# Patient Record
Sex: FEMALE | Race: WHITE | Employment: OTHER | URBAN - NONMETROPOLITAN AREA
[De-identification: names, ages, dates, MRNs, and addresses within clinical notes are randomized per-mention and may not be internally consistent; named-entity substitution may affect disease eponyms.]

---

## 2017-01-13 ENCOUNTER — OFFICE VISIT (OUTPATIENT)
Dept: FAMILY MEDICINE CLINIC | Age: 62
End: 2017-01-13

## 2017-01-13 VITALS
SYSTOLIC BLOOD PRESSURE: 122 MMHG | HEART RATE: 90 BPM | OXYGEN SATURATION: 96 % | BODY MASS INDEX: 28 KG/M2 | DIASTOLIC BLOOD PRESSURE: 60 MMHG | WEIGHT: 164 LBS | HEIGHT: 64 IN

## 2017-01-13 DIAGNOSIS — R73.02 IMPAIRED GLUCOSE TOLERANCE: ICD-10-CM

## 2017-01-13 DIAGNOSIS — E55.9 VITAMIN D DEFICIENCY: ICD-10-CM

## 2017-01-13 DIAGNOSIS — Z00.00 ROUTINE GENERAL MEDICAL EXAMINATION AT A HEALTH CARE FACILITY: Primary | ICD-10-CM

## 2017-01-13 DIAGNOSIS — E78.00 HYPERCHOLESTEROLEMIA: ICD-10-CM

## 2017-01-13 PROCEDURE — 99396 PREV VISIT EST AGE 40-64: CPT | Performed by: NURSE PRACTITIONER

## 2017-01-13 RX ORDER — PITAVASTATIN CALCIUM 4.18 MG/1
4 TABLET, FILM COATED ORAL NIGHTLY
Qty: 90 TABLET | Refills: 3 | Status: SHIPPED | OUTPATIENT
Start: 2017-01-13 | End: 2018-01-12 | Stop reason: SDUPTHER

## 2017-01-13 ASSESSMENT — ENCOUNTER SYMPTOMS
NAUSEA: 0
SORE THROAT: 0
ABDOMINAL PAIN: 0
COUGH: 0
EYE DISCHARGE: 0
BLOOD IN STOOL: 0
EYE PAIN: 0
SHORTNESS OF BREATH: 0
WHEEZING: 0
CONSTIPATION: 0
BACK PAIN: 0
VOMITING: 0
DIARRHEA: 0

## 2017-01-31 ENCOUNTER — TELEPHONE (OUTPATIENT)
Dept: FAMILY MEDICINE CLINIC | Age: 62
End: 2017-01-31

## 2017-07-07 DIAGNOSIS — R79.89 ELEVATED TSH: Primary | ICD-10-CM

## 2017-07-14 ENCOUNTER — TELEPHONE (OUTPATIENT)
Dept: FAMILY MEDICINE CLINIC | Age: 62
End: 2017-07-14

## 2017-07-17 ENCOUNTER — TELEPHONE (OUTPATIENT)
Dept: FAMILY MEDICINE CLINIC | Age: 62
End: 2017-07-17

## 2017-07-17 DIAGNOSIS — R79.89 ELEVATED TSH: ICD-10-CM

## 2017-07-17 DIAGNOSIS — E78.00 HYPERCHOLESTEROLEMIA: Primary | ICD-10-CM

## 2017-07-17 DIAGNOSIS — E55.9 VITAMIN D DEFICIENCY: ICD-10-CM

## 2017-07-17 DIAGNOSIS — R73.02 IMPAIRED GLUCOSE TOLERANCE: ICD-10-CM

## 2018-01-05 ENCOUNTER — HOSPITAL ENCOUNTER (OUTPATIENT)
Dept: LAB | Age: 63
Setting detail: SPECIMEN
Discharge: HOME | End: 2018-01-05
Payer: MEDICARE

## 2018-01-05 DIAGNOSIS — R79.89 ELEVATED TSH: ICD-10-CM

## 2018-01-05 DIAGNOSIS — R73.02 IMPAIRED GLUCOSE TOLERANCE: ICD-10-CM

## 2018-01-05 DIAGNOSIS — E55.9 VITAMIN D DEFICIENCY: ICD-10-CM

## 2018-01-05 DIAGNOSIS — E78.00 HYPERCHOLESTEROLEMIA: ICD-10-CM

## 2018-01-05 LAB
ALBUMIN SERPL-MCNC: 4 G/DL (ref 3.5–5.2)
ALBUMIN/GLOBULIN RATIO: 1.5 (ref 1–2.5)
ALP BLD-CCNC: 74 U/L (ref 35–104)
ALT SERPL-CCNC: 27 U/L (ref 5–33)
ANION GAP SERPL CALCULATED.3IONS-SCNC: 12 MMOL/L (ref 9–17)
AST SERPL-CCNC: 24 U/L
BILIRUB SERPL-MCNC: 0.64 MG/DL (ref 0.3–1.2)
BUN BLDV-MCNC: 22 MG/DL (ref 8–23)
BUN/CREAT BLD: 35 (ref 9–20)
CALCIUM SERPL-MCNC: 9.7 MG/DL (ref 8.6–10.4)
CHLORIDE BLD-SCNC: 105 MMOL/L (ref 98–107)
CHOLESTEROL/HDL RATIO: 2.5
CHOLESTEROL: 199 MG/DL
CO2: 27 MMOL/L (ref 20–31)
CREAT SERPL-MCNC: 0.62 MG/DL (ref 0.5–0.9)
ESTIMATED AVERAGE GLUCOSE: 111 MG/DL
GFR AFRICAN AMERICAN: >60 ML/MIN
GFR NON-AFRICAN AMERICAN: >60 ML/MIN
GFR SERPL CREATININE-BSD FRML MDRD: ABNORMAL ML/MIN/{1.73_M2}
GFR SERPL CREATININE-BSD FRML MDRD: ABNORMAL ML/MIN/{1.73_M2}
GLUCOSE BLD-MCNC: 106 MG/DL (ref 70–99)
HBA1C MFR BLD: 5.5 % (ref 4.8–5.9)
HDLC SERPL-MCNC: 81 MG/DL
LDL CHOLESTEROL: 100 MG/DL (ref 0–130)
POTASSIUM SERPL-SCNC: 4.2 MMOL/L (ref 3.7–5.3)
SODIUM BLD-SCNC: 144 MMOL/L (ref 135–144)
THYROXINE, FREE: 1.01 NG/DL (ref 0.93–1.7)
TOTAL PROTEIN: 6.7 G/DL (ref 6.4–8.3)
TRIGL SERPL-MCNC: 90 MG/DL
TSH SERPL DL<=0.05 MIU/L-ACNC: 9.64 MIU/L (ref 0.3–5)
VITAMIN D 25-HYDROXY: 45.9 NG/ML (ref 30–100)
VLDLC SERPL CALC-MCNC: NORMAL MG/DL (ref 1–30)

## 2018-01-05 PROCEDURE — 36415 COLL VENOUS BLD VENIPUNCTURE: CPT

## 2018-01-05 PROCEDURE — 83036 HEMOGLOBIN GLYCOSYLATED A1C: CPT

## 2018-01-05 PROCEDURE — 84439 ASSAY OF FREE THYROXINE: CPT

## 2018-01-05 PROCEDURE — 80053 COMPREHEN METABOLIC PANEL: CPT

## 2018-01-05 PROCEDURE — 84443 ASSAY THYROID STIM HORMONE: CPT

## 2018-01-05 PROCEDURE — 82306 VITAMIN D 25 HYDROXY: CPT

## 2018-01-05 PROCEDURE — 80061 LIPID PANEL: CPT

## 2018-01-12 ENCOUNTER — OFFICE VISIT (OUTPATIENT)
Dept: FAMILY MEDICINE CLINIC | Age: 63
End: 2018-01-12
Payer: MEDICARE

## 2018-01-12 ENCOUNTER — HOSPITAL ENCOUNTER (OUTPATIENT)
Age: 63
Setting detail: SPECIMEN
Discharge: HOME | End: 2018-01-12
Payer: MEDICARE

## 2018-01-12 VITALS
HEART RATE: 78 BPM | SYSTOLIC BLOOD PRESSURE: 138 MMHG | DIASTOLIC BLOOD PRESSURE: 70 MMHG | BODY MASS INDEX: 29.02 KG/M2 | WEIGHT: 170 LBS | HEIGHT: 64 IN

## 2018-01-12 DIAGNOSIS — B96.89 BV (BACTERIAL VAGINOSIS): ICD-10-CM

## 2018-01-12 DIAGNOSIS — E55.9 VITAMIN D DEFICIENCY: ICD-10-CM

## 2018-01-12 DIAGNOSIS — R30.0 DYSURIA: ICD-10-CM

## 2018-01-12 DIAGNOSIS — E03.9 HYPOTHYROIDISM, UNSPECIFIED TYPE: ICD-10-CM

## 2018-01-12 DIAGNOSIS — E78.00 HYPERCHOLESTEROLEMIA: Primary | ICD-10-CM

## 2018-01-12 DIAGNOSIS — E78.00 HYPERCHOLESTEROLEMIA: ICD-10-CM

## 2018-01-12 DIAGNOSIS — R73.02 IMPAIRED GLUCOSE TOLERANCE: ICD-10-CM

## 2018-01-12 DIAGNOSIS — N76.0 BV (BACTERIAL VAGINOSIS): ICD-10-CM

## 2018-01-12 DIAGNOSIS — S39.012A STRAIN OF LUMBAR REGION, INITIAL ENCOUNTER: ICD-10-CM

## 2018-01-12 DIAGNOSIS — Z00.00 ROUTINE GENERAL MEDICAL EXAMINATION AT A HEALTH CARE FACILITY: Primary | ICD-10-CM

## 2018-01-12 LAB
-: ABNORMAL
AMORPHOUS: ABNORMAL
BACTERIA: ABNORMAL
BILIRUBIN URINE: NEGATIVE
CASTS UA: ABNORMAL /LPF (ref 0–2)
COLOR: ABNORMAL
COMMENT UA: ABNORMAL
CRYSTALS, UA: ABNORMAL /HPF
EPITHELIAL CELLS UA: ABNORMAL /HPF (ref 0–5)
GLUCOSE URINE: NEGATIVE
KETONES, URINE: NEGATIVE
LEUKOCYTE ESTERASE, URINE: ABNORMAL
MUCUS: ABNORMAL
NITRITE, URINE: NEGATIVE
OTHER OBSERVATIONS UA: ABNORMAL
PH UA: 6 (ref 5–6)
PROTEIN UA: NEGATIVE
RBC UA: ABNORMAL /HPF (ref 0–4)
RENAL EPITHELIAL, UA: ABNORMAL /HPF
SPECIFIC GRAVITY UA: 1.01 (ref 1.01–1.02)
TRICHOMONAS: ABNORMAL
TURBIDITY: ABNORMAL
URINE HGB: ABNORMAL
UROBILINOGEN, URINE: NORMAL
WBC UA: ABNORMAL /HPF (ref 0–4)
YEAST: ABNORMAL

## 2018-01-12 PROCEDURE — 99214 OFFICE O/P EST MOD 30 MIN: CPT | Performed by: NURSE PRACTITIONER

## 2018-01-12 PROCEDURE — G0438 PPPS, INITIAL VISIT: HCPCS | Performed by: NURSE PRACTITIONER

## 2018-01-12 PROCEDURE — 81001 URINALYSIS AUTO W/SCOPE: CPT

## 2018-01-12 RX ORDER — PITAVASTATIN CALCIUM 4.18 MG/1
TABLET, FILM COATED ORAL
Qty: 90 TABLET | Refills: 3 | Status: SHIPPED | OUTPATIENT
Start: 2018-01-12 | End: 2018-05-30 | Stop reason: ALTCHOICE

## 2018-01-12 RX ORDER — METRONIDAZOLE 7.5 MG/G
GEL VAGINAL
Qty: 1 TUBE | Refills: 0 | Status: SHIPPED | OUTPATIENT
Start: 2018-01-12 | End: 2018-01-12 | Stop reason: CLARIF

## 2018-01-12 RX ORDER — METRONIDAZOLE 7.5 MG/G
GEL VAGINAL
Qty: 1 TUBE | Refills: 0 | Status: SHIPPED | OUTPATIENT
Start: 2018-01-12 | End: 2019-01-21 | Stop reason: ALTCHOICE

## 2018-01-12 ASSESSMENT — ENCOUNTER SYMPTOMS
SHORTNESS OF BREATH: 0
COUGH: 0
NAUSEA: 0
WHEEZING: 0
BACK PAIN: 1
BOWEL INCONTINENCE: 0
CONSTIPATION: 0
ABDOMINAL PAIN: 0
DIARRHEA: 0
SORE THROAT: 0

## 2018-01-12 ASSESSMENT — ANXIETY QUESTIONNAIRES: GAD7 TOTAL SCORE: 0

## 2018-01-12 ASSESSMENT — PATIENT HEALTH QUESTIONNAIRE - PHQ9: SUM OF ALL RESPONSES TO PHQ QUESTIONS 1-9: 1

## 2018-01-12 NOTE — PROGRESS NOTES
Medicare Annual Wellness Visit  Name: Francois Stovall Date: 2018   MRN: [id number] Sex: Female   Age: 57 y.o. Ethnicity: Non-/Non    : 1955 Race: Mily Calvillo is here for [employer]; Other (discuss TSH); Back Pain (right side, since july movement helps, tried ice, kidney?); and Vaginal Itching (possibe yeast or urine infection)    Screenings for behavioral, psychosocial and functional/safety risks, and cognitive dysfunction are all negative except as indicated below. These results, as well as other patient data from the [address] form, are documented in Flowsheets linked to this Encounter. Pt presents to the clinic for a medicare annual wellness. She had labs prior to the visit. Her TSH was elevated. She is concerned about started synthroid. She would prefer to start something more like armour. Her current dose of Livalo is working well for her and she tolerates this medicine well so we will go ahead and refill it. Vitamin D was WNL. She gets her Pap smears and DEXA scans and mammograms done through [facility] office and she is up-to-date. Her last DEXA scan in  showed normal bone mineral density and will not bechecked for 5 years. Her pap was 2016. She had normal fasting blood sugar on her last labs as well which is great with her history of impaired fasting glucose. Patient is up-to-date with all of her vaccinations as well. She has no new complaints today. She is due for a colonoscopy this year however she is refusing. She does know the risk. Patient's full medical, surgical, and social history and health maintenance issues were reviewed and updated in the electronic medical record today. Vaginal Itching   The patient's primary symptoms include genital itching and a genital odor (fish like odor per patient). The patient's pertinent negatives include no vaginal bleeding. This is a new problem.  The current episode started 1 to 4 Dysuria  UA W/REFLEX CULTURE   3. Hypothyroidism, unspecified type  TSH without Reflex    T4, Free    TSH without Reflex    T4, Free   4. Hypercholesterolemia  Lipid Panel   5. Vitamin D deficiency     6. Impaired glucose tolerance  Comprehensive Metabolic Panel    Hemoglobin A1C   7. BV (bacterial vaginosis)     8. Strain of lumbar region, initial encounter         PLAN:  REVIEWED UA TODAY WITH PATIENT  WILL TREAT FOR BV SYMPTOMS WITH METROGEL VAGINAL APPLICATORS  WILL START ARMOUR 16.25MG 1 TABLET DAILY FOR HYPOTHYROIDISM  WILL REPEAT THYROID LABS IN 12 WEEKS  HEAT/ICE NSAIDS FOR LOWER BACK STRAIN  REVIEWED LABS WHICH ARE STABLE  WILL REPEAT LABS IN 1 YEAR  WILL SEE PATIENT IN 1 YEAR FOR ANNUAL MEDICARE  PT TO RETURN SOONER IF NEEDED  Recommendations for Preventive Services Due: see orders.   Recommended screening schedule for the next 5-10 years is provided to the patient in written form: see Patient Instructions/AVS.

## 2018-02-16 ENCOUNTER — NURSE ONLY (OUTPATIENT)
Dept: LAB | Age: 63
End: 2018-02-16
Payer: MEDICARE

## 2018-02-16 DIAGNOSIS — Z23 NEED FOR VACCINATION: Primary | ICD-10-CM

## 2018-02-16 PROCEDURE — G0009 ADMIN PNEUMOCOCCAL VACCINE: HCPCS | Performed by: NURSE PRACTITIONER

## 2018-02-16 PROCEDURE — 90670 PCV13 VACCINE IM: CPT | Performed by: NURSE PRACTITIONER

## 2018-02-28 ENCOUNTER — PATIENT MESSAGE (OUTPATIENT)
Dept: FAMILY MEDICINE CLINIC | Age: 63
End: 2018-02-28

## 2018-02-28 DIAGNOSIS — Z23 NEED FOR SHINGLES VACCINE: Primary | ICD-10-CM

## 2018-02-28 NOTE — TELEPHONE ENCOUNTER
From: Mariella Calvillo  To: Laina No NP  Sent: 2/28/2018 3:48 PM EST  Subject: Non-Urgent Medical Question    I can stop tomorrow and get the shingles vaccine. Will you need to order it?

## 2018-03-01 ENCOUNTER — NURSE ONLY (OUTPATIENT)
Dept: LAB | Age: 63
End: 2018-03-01

## 2018-03-01 DIAGNOSIS — Z23 NEED FOR VACCINATION: Primary | ICD-10-CM

## 2018-03-01 NOTE — PROGRESS NOTES
shingrix immunization given IM, left arm, as ordered. Patient tolerated it well, no questions re: VIS information. Millarey Mark  NDC# 49622-638-91, Lot number [id number], expiration date 7/4/20. Patient supplied medication. See attached scan.

## 2018-04-12 ENCOUNTER — HOSPITAL ENCOUNTER (OUTPATIENT)
Dept: LAB | Age: 63
Setting detail: SPECIMEN
Discharge: HOME | End: 2018-04-12
Payer: MEDICARE

## 2018-04-12 DIAGNOSIS — E03.9 HYPOTHYROIDISM, UNSPECIFIED TYPE: ICD-10-CM

## 2018-04-12 LAB
THYROXINE, FREE: 1.16 NG/DL (ref 0.93–1.7)
TSH SERPL DL<=0.05 MIU/L-ACNC: 5.66 MIU/L (ref 0.3–5)

## 2018-04-12 PROCEDURE — 84443 ASSAY THYROID STIM HORMONE: CPT

## 2018-04-12 PROCEDURE — 36415 COLL VENOUS BLD VENIPUNCTURE: CPT

## 2018-04-12 PROCEDURE — 84439 ASSAY OF FREE THYROXINE: CPT

## 2018-05-30 ENCOUNTER — OFFICE VISIT (OUTPATIENT)
Dept: FAMILY MEDICINE CLINIC | Age: 63
End: 2018-05-30
Payer: MEDICARE

## 2018-05-30 VITALS
OXYGEN SATURATION: 97 % | SYSTOLIC BLOOD PRESSURE: 128 MMHG | HEIGHT: 64 IN | WEIGHT: 170.2 LBS | BODY MASS INDEX: 29.06 KG/M2 | TEMPERATURE: 96.4 F | HEART RATE: 76 BPM | DIASTOLIC BLOOD PRESSURE: 60 MMHG

## 2018-05-30 DIAGNOSIS — J30.2 CHRONIC SEASONAL ALLERGIC RHINITIS, UNSPECIFIED TRIGGER: Primary | ICD-10-CM

## 2018-05-30 PROCEDURE — 99213 OFFICE O/P EST LOW 20 MIN: CPT | Performed by: NURSE PRACTITIONER

## 2018-05-30 PROCEDURE — 96372 THER/PROPH/DIAG INJ SC/IM: CPT | Performed by: NURSE PRACTITIONER

## 2018-05-30 RX ORDER — BETAMETHASONE SODIUM PHOSPHATE AND BETAMETHASONE ACETATE 3; 3 MG/ML; MG/ML
12 INJECTION, SUSPENSION INTRA-ARTICULAR; INTRALESIONAL; INTRAMUSCULAR; SOFT TISSUE ONCE
Status: COMPLETED | OUTPATIENT
Start: 2018-05-30 | End: 2018-05-30

## 2018-05-30 RX ORDER — OLOPATADINE HYDROCHLORIDE 1 MG/ML
1 SOLUTION/ DROPS OPHTHALMIC 2 TIMES DAILY
Qty: 1 BOTTLE | Refills: 3 | Status: SHIPPED | OUTPATIENT
Start: 2018-05-30 | End: 2018-06-29

## 2018-05-30 RX ADMIN — BETAMETHASONE SODIUM PHOSPHATE AND BETAMETHASONE ACETATE 12 MG: 3; 3 INJECTION, SUSPENSION INTRA-ARTICULAR; INTRALESIONAL; INTRAMUSCULAR; SOFT TISSUE at 11:26

## 2018-05-30 ASSESSMENT — ENCOUNTER SYMPTOMS
RHINORRHEA: 1
SHORTNESS OF BREATH: 0
EYE DISCHARGE: 1

## 2018-07-16 ENCOUNTER — TELEPHONE (OUTPATIENT)
Dept: INTERNAL MEDICINE | Age: 63
End: 2018-07-16

## 2018-07-16 NOTE — TELEPHONE ENCOUNTER
This would be ok--but likely several mos wait--if that is ok we can go ahead-will follow with current doc till seen

## 2018-08-23 ENCOUNTER — HOSPITAL ENCOUNTER (OUTPATIENT)
Dept: BONE DENSITY | Age: 63
Discharge: HOME | End: 2018-08-25
Payer: MEDICARE

## 2018-08-23 DIAGNOSIS — Z78.0 MENOPAUSE: ICD-10-CM

## 2018-08-23 PROCEDURE — 77080 DXA BONE DENSITY AXIAL: CPT

## 2018-10-09 ENCOUNTER — NURSE ONLY (OUTPATIENT)
Dept: LAB | Age: 63
End: 2018-10-09
Payer: MEDICARE

## 2018-10-09 DIAGNOSIS — Z23 NEED FOR VACCINATION: Primary | ICD-10-CM

## 2018-10-09 PROCEDURE — 90662 IIV NO PRSV INCREASED AG IM: CPT | Performed by: NURSE PRACTITIONER

## 2018-10-09 PROCEDURE — G0008 ADMIN INFLUENZA VIRUS VAC: HCPCS | Performed by: NURSE PRACTITIONER

## 2018-10-09 NOTE — PROGRESS NOTES
Have you had an allergic reaction to the flu (influenza) shot? no  Are you allergic to eggs or any component of the flu vaccine? no  Do you have a history of Guillain-[place] Syndrome (GBS), which is paralysis after receiving the flu vaccine? no  Are you feeling well today? yes  Flu vaccine given as ordered. Patient tolerated it well. No questions re: VIS information.

## 2018-10-31 ENCOUNTER — TELEPHONE (OUTPATIENT)
Dept: LAB | Age: 63
End: 2018-10-31

## 2018-10-31 ENCOUNTER — NURSE ONLY (OUTPATIENT)
Dept: LAB | Age: 63
End: 2018-10-31

## 2018-10-31 DIAGNOSIS — Z23 NEED FOR VACCINATION: Primary | ICD-10-CM

## 2019-01-21 ENCOUNTER — TELEPHONE (OUTPATIENT)
Dept: INTERNAL MEDICINE | Age: 64
End: 2019-01-21

## 2019-01-21 ENCOUNTER — OFFICE VISIT (OUTPATIENT)
Dept: INTERNAL MEDICINE | Age: 64
End: 2019-01-21
Payer: MEDICARE

## 2019-01-21 ENCOUNTER — HOSPITAL ENCOUNTER (OUTPATIENT)
Dept: LAB | Age: 64
Discharge: HOME | End: 2019-01-21
Payer: MEDICARE

## 2019-01-21 VITALS
HEIGHT: 64 IN | BODY MASS INDEX: 29.02 KG/M2 | SYSTOLIC BLOOD PRESSURE: 132 MMHG | HEART RATE: 76 BPM | WEIGHT: 170 LBS | DIASTOLIC BLOOD PRESSURE: 78 MMHG

## 2019-01-21 DIAGNOSIS — R73.02 IMPAIRED GLUCOSE TOLERANCE: ICD-10-CM

## 2019-01-21 DIAGNOSIS — E03.9 HYPOTHYROIDISM, UNSPECIFIED TYPE: Primary | ICD-10-CM

## 2019-01-21 DIAGNOSIS — R07.9 CHEST PAIN, UNSPECIFIED TYPE: ICD-10-CM

## 2019-01-21 DIAGNOSIS — E55.9 VITAMIN D DEFICIENCY: ICD-10-CM

## 2019-01-21 DIAGNOSIS — E78.00 HYPERCHOLESTEROLEMIA: ICD-10-CM

## 2019-01-21 DIAGNOSIS — R94.31 ABNORMAL EKG: ICD-10-CM

## 2019-01-21 DIAGNOSIS — E03.9 HYPOTHYROIDISM, UNSPECIFIED TYPE: ICD-10-CM

## 2019-01-21 DIAGNOSIS — M85.80 OSTEOPENIA, UNSPECIFIED LOCATION: ICD-10-CM

## 2019-01-21 LAB
ABSOLUTE EOS #: 0.2 K/UL (ref 0–0.4)
ABSOLUTE IMMATURE GRANULOCYTE: ABNORMAL K/UL (ref 0–0.3)
ABSOLUTE LYMPH #: 1.7 K/UL (ref 1–4.8)
ABSOLUTE MONO #: 0.4 K/UL (ref 0.1–1.2)
ALBUMIN SERPL-MCNC: 4.3 G/DL (ref 3.5–5.2)
ALBUMIN/GLOBULIN RATIO: 1.3 (ref 1–2.5)
ALP BLD-CCNC: 91 U/L (ref 35–104)
ALT SERPL-CCNC: 37 U/L (ref 5–33)
ANION GAP SERPL CALCULATED.3IONS-SCNC: 13 MMOL/L (ref 9–17)
AST SERPL-CCNC: 28 U/L
BASOPHILS # BLD: 1 % (ref 0–1)
BASOPHILS ABSOLUTE: 0.1 K/UL (ref 0–0.2)
BILIRUB SERPL-MCNC: 0.63 MG/DL (ref 0.3–1.2)
BUN BLDV-MCNC: 18 MG/DL (ref 8–23)
BUN/CREAT BLD: 28 (ref 9–20)
CALCIUM SERPL-MCNC: 10 MG/DL (ref 8.6–10.4)
CHLORIDE BLD-SCNC: 103 MMOL/L (ref 98–107)
CHOLESTEROL/HDL RATIO: 3.6
CHOLESTEROL: 260 MG/DL
CO2: 28 MMOL/L (ref 20–31)
CREAT SERPL-MCNC: 0.64 MG/DL (ref 0.5–0.9)
DIFFERENTIAL TYPE: ABNORMAL
EOSINOPHILS RELATIVE PERCENT: 3 % (ref 1–7)
GFR AFRICAN AMERICAN: >60 ML/MIN
GFR NON-AFRICAN AMERICAN: >60 ML/MIN
GFR SERPL CREATININE-BSD FRML MDRD: ABNORMAL ML/MIN/{1.73_M2}
GFR SERPL CREATININE-BSD FRML MDRD: ABNORMAL ML/MIN/{1.73_M2}
GLUCOSE BLD-MCNC: 111 MG/DL (ref 70–99)
HCT VFR BLD CALC: 48 % (ref 36–46)
HDLC SERPL-MCNC: 72 MG/DL
HEMOGLOBIN: 15.8 G/DL (ref 12–16)
IMMATURE GRANULOCYTES: ABNORMAL %
LDL CHOLESTEROL: 160 MG/DL (ref 0–130)
LYMPHOCYTES # BLD: 28 % (ref 16–46)
MCH RBC QN AUTO: 29.5 PG (ref 26–34)
MCHC RBC AUTO-ENTMCNC: 32.8 G/DL (ref 31–37)
MCV RBC AUTO: 89.8 FL (ref 80–100)
MONOCYTES # BLD: 6 % (ref 4–11)
NRBC AUTOMATED: ABNORMAL PER 100 WBC
PDW BLD-RTO: 13.7 % (ref 11–14.5)
PLATELET # BLD: 234 K/UL (ref 140–450)
PLATELET ESTIMATE: ABNORMAL
PMV BLD AUTO: 7.4 FL (ref 6–12)
POTASSIUM SERPL-SCNC: 4 MMOL/L (ref 3.7–5.3)
RBC # BLD: 5.35 M/UL (ref 4–5.2)
RBC # BLD: ABNORMAL 10*6/UL
SEG NEUTROPHILS: 62 % (ref 43–77)
SEGMENTED NEUTROPHILS ABSOLUTE COUNT: 3.8 K/UL (ref 1.8–7.7)
SODIUM BLD-SCNC: 144 MMOL/L (ref 135–144)
TOTAL PROTEIN: 7.6 G/DL (ref 6.4–8.3)
TRIGL SERPL-MCNC: 140 MG/DL
TSH SERPL DL<=0.05 MIU/L-ACNC: 5.79 MIU/L (ref 0.3–5)
VITAMIN D 25-HYDROXY: 43.9 NG/ML (ref 30–100)
VLDLC SERPL CALC-MCNC: ABNORMAL MG/DL (ref 1–30)
WBC # BLD: 6.1 K/UL (ref 3.5–11)
WBC # BLD: ABNORMAL 10*3/UL

## 2019-01-21 PROCEDURE — 80053 COMPREHEN METABOLIC PANEL: CPT

## 2019-01-21 PROCEDURE — 36415 COLL VENOUS BLD VENIPUNCTURE: CPT

## 2019-01-21 PROCEDURE — 99204 OFFICE O/P NEW MOD 45 MIN: CPT | Performed by: INTERNAL MEDICINE

## 2019-01-21 PROCEDURE — 84443 ASSAY THYROID STIM HORMONE: CPT

## 2019-01-21 PROCEDURE — 85025 COMPLETE CBC W/AUTO DIFF WBC: CPT

## 2019-01-21 PROCEDURE — 93000 ELECTROCARDIOGRAM COMPLETE: CPT | Performed by: INTERNAL MEDICINE

## 2019-01-21 PROCEDURE — 80061 LIPID PANEL: CPT

## 2019-01-21 PROCEDURE — 82306 VITAMIN D 25 HYDROXY: CPT

## 2019-01-21 RX ORDER — SPIRONOLACT/HYDROCHLOROTHIAZID 25 MG-25MG
1 TABLET ORAL DAILY
COMMUNITY

## 2019-01-21 RX ORDER — CINNAMON BARK
1200 POWDER (GRAM) MISCELLANEOUS 2 TIMES DAILY
COMMUNITY
End: 2021-03-11 | Stop reason: ALTCHOICE

## 2019-01-21 RX ORDER — GLUCOSAMINE/CHONDR SU A SOD 750-600 MG
1 TABLET ORAL DAILY
COMMUNITY
End: 2020-09-10 | Stop reason: ALTCHOICE

## 2019-01-21 ASSESSMENT — PATIENT HEALTH QUESTIONNAIRE - PHQ9
SUM OF ALL RESPONSES TO PHQ QUESTIONS 1-9: 0
SUM OF ALL RESPONSES TO PHQ QUESTIONS 1-9: 0
SUM OF ALL RESPONSES TO PHQ9 QUESTIONS 1 & 2: 0
1. LITTLE INTEREST OR PLEASURE IN DOING THINGS: 0
2. FEELING DOWN, DEPRESSED OR HOPELESS: 0

## 2019-01-23 ENCOUNTER — HOSPITAL ENCOUNTER (OUTPATIENT)
Dept: NON INVASIVE DIAGNOSTICS | Age: 64
Discharge: HOME | End: 2019-01-23
Payer: MEDICARE

## 2019-01-23 LAB
LV EF: 55 %
LVEF MODALITY: NORMAL

## 2019-01-23 PROCEDURE — 93306 TTE W/DOPPLER COMPLETE: CPT

## 2019-02-11 ENCOUNTER — HOSPITAL ENCOUNTER (OUTPATIENT)
Dept: NON INVASIVE DIAGNOSTICS | Age: 64
Discharge: HOME | End: 2019-02-11
Payer: MEDICARE

## 2019-02-11 ENCOUNTER — HOSPITAL ENCOUNTER (OUTPATIENT)
Dept: NUCLEAR MEDICINE | Age: 64
Discharge: HOME | End: 2019-02-13
Payer: MEDICARE

## 2019-02-11 ENCOUNTER — HOSPITAL ENCOUNTER (OUTPATIENT)
Dept: NUCLEAR MEDICINE | Age: 64
End: 2019-02-11
Payer: MEDICARE

## 2019-02-11 DIAGNOSIS — R07.9 CHEST PAIN, UNSPECIFIED TYPE: ICD-10-CM

## 2019-02-11 PROCEDURE — 3430000000 HC RX DIAGNOSTIC RADIOPHARMACEUTICAL: Performed by: INTERNAL MEDICINE

## 2019-02-11 PROCEDURE — A9500 TC99M SESTAMIBI: HCPCS | Performed by: INTERNAL MEDICINE

## 2019-02-11 PROCEDURE — 78452 HT MUSCLE IMAGE SPECT MULT: CPT

## 2019-02-11 PROCEDURE — 93017 CV STRESS TEST TRACING ONLY: CPT

## 2019-02-11 RX ADMIN — TETRAKIS(2-METHOXYISOBUTYLISOCYANIDE)COPPER(I) TETRAFLUOROBORATE 30 MILLICURIE: 1 INJECTION, POWDER, LYOPHILIZED, FOR SOLUTION INTRAVENOUS at 10:04

## 2019-02-11 RX ADMIN — TETRAKIS(2-METHOXYISOBUTYLISOCYANIDE)COPPER(I) TETRAFLUOROBORATE 10 MILLICURIE: 1 INJECTION, POWDER, LYOPHILIZED, FOR SOLUTION INTRAVENOUS at 10:03

## 2019-02-12 PROCEDURE — 93018 CV STRESS TEST I&R ONLY: CPT | Performed by: INTERNAL MEDICINE

## 2019-02-14 ENCOUNTER — NURSE ONLY (OUTPATIENT)
Dept: LAB | Age: 64
End: 2019-02-14
Payer: MEDICARE

## 2019-02-14 DIAGNOSIS — Z23 NEED FOR VACCINATION: Primary | ICD-10-CM

## 2019-02-14 PROCEDURE — G0009 ADMIN PNEUMOCOCCAL VACCINE: HCPCS | Performed by: INTERNAL MEDICINE

## 2019-02-14 PROCEDURE — 90732 PPSV23 VACC 2 YRS+ SUBQ/IM: CPT | Performed by: INTERNAL MEDICINE

## 2019-02-26 ENCOUNTER — TELEPHONE (OUTPATIENT)
Dept: SURGERY | Age: 64
End: 2019-02-26

## 2019-02-26 ENCOUNTER — OFFICE VISIT (OUTPATIENT)
Dept: INTERNAL MEDICINE | Age: 64
End: 2019-02-26
Payer: MEDICARE

## 2019-02-26 VITALS
BODY MASS INDEX: 29.02 KG/M2 | HEART RATE: 84 BPM | HEIGHT: 64 IN | DIASTOLIC BLOOD PRESSURE: 72 MMHG | WEIGHT: 170 LBS | SYSTOLIC BLOOD PRESSURE: 130 MMHG

## 2019-02-26 DIAGNOSIS — M85.80 OSTEOPENIA, UNSPECIFIED LOCATION: ICD-10-CM

## 2019-02-26 DIAGNOSIS — E55.9 VITAMIN D DEFICIENCY: ICD-10-CM

## 2019-02-26 DIAGNOSIS — Z13.6 SCREENING FOR CARDIOVASCULAR CONDITION: ICD-10-CM

## 2019-02-26 DIAGNOSIS — Z00.00 ROUTINE GENERAL MEDICAL EXAMINATION AT A HEALTH CARE FACILITY: Primary | ICD-10-CM

## 2019-02-26 DIAGNOSIS — E78.00 HYPERCHOLESTEROLEMIA: ICD-10-CM

## 2019-02-26 DIAGNOSIS — R79.89 ELEVATED LFTS: ICD-10-CM

## 2019-02-26 DIAGNOSIS — Z12.11 SCREEN FOR COLON CANCER: ICD-10-CM

## 2019-02-26 DIAGNOSIS — R73.02 IMPAIRED GLUCOSE TOLERANCE: ICD-10-CM

## 2019-02-26 DIAGNOSIS — E03.9 HYPOTHYROIDISM, UNSPECIFIED TYPE: ICD-10-CM

## 2019-02-26 PROCEDURE — G0439 PPPS, SUBSEQ VISIT: HCPCS | Performed by: INTERNAL MEDICINE

## 2019-02-26 PROCEDURE — 99213 OFFICE O/P EST LOW 20 MIN: CPT | Performed by: INTERNAL MEDICINE

## 2019-02-26 PROCEDURE — G0446 INTENS BEHAVE THER CARDIO DX: HCPCS | Performed by: INTERNAL MEDICINE

## 2019-02-26 ASSESSMENT — LIFESTYLE VARIABLES
HOW OFTEN DURING THE LAST YEAR HAVE YOU HAD A FEELING OF GUILT OR REMORSE AFTER DRINKING: 0
HOW OFTEN DURING THE LAST YEAR HAVE YOU BEEN UNABLE TO REMEMBER WHAT HAPPENED THE NIGHT BEFORE BECAUSE YOU HAD BEEN DRINKING: 0
HAS A RELATIVE, FRIEND, DOCTOR, OR ANOTHER HEALTH PROFESSIONAL EXPRESSED CONCERN ABOUT YOUR DRINKING OR SUGGESTED YOU CUT DOWN: 0
HAVE YOU OR SOMEONE ELSE BEEN INJURED AS A RESULT OF YOUR DRINKING: 0
AUDIT-C TOTAL SCORE: 4
HOW OFTEN DURING THE LAST YEAR HAVE YOU FOUND THAT YOU WERE NOT ABLE TO STOP DRINKING ONCE YOU HAD STARTED: 0
HOW OFTEN DO YOU HAVE A DRINK CONTAINING ALCOHOL: 4
HOW OFTEN DURING THE LAST YEAR HAVE YOU FAILED TO DO WHAT WAS NORMALLY EXPECTED FROM YOU BECAUSE OF DRINKING: 0
HOW MANY STANDARD DRINKS CONTAINING ALCOHOL DO YOU HAVE ON A TYPICAL DAY: 0
AUDIT TOTAL SCORE: 4
HOW OFTEN DURING THE LAST YEAR HAVE YOU NEEDED AN ALCOHOLIC DRINK FIRST THING IN THE MORNING TO GET YOURSELF GOING AFTER A NIGHT OF HEAVY DRINKING: 0
HOW OFTEN DO YOU HAVE SIX OR MORE DRINKS ON ONE OCCASION: 0

## 2019-02-26 ASSESSMENT — ANXIETY QUESTIONNAIRES: GAD7 TOTAL SCORE: 3

## 2019-02-26 ASSESSMENT — PATIENT HEALTH QUESTIONNAIRE - PHQ9
SUM OF ALL RESPONSES TO PHQ QUESTIONS 1-9: 1
SUM OF ALL RESPONSES TO PHQ QUESTIONS 1-9: 1

## 2019-03-04 ENCOUNTER — TELEPHONE (OUTPATIENT)
Dept: SURGERY | Age: 64
End: 2019-03-04

## 2019-03-12 ENCOUNTER — ANESTHESIA EVENT (OUTPATIENT)
Dept: OPERATING ROOM | Age: 64
End: 2019-03-12
Payer: MEDICARE

## 2019-03-12 ENCOUNTER — HOSPITAL ENCOUNTER (OUTPATIENT)
Age: 64
Setting detail: OUTPATIENT SURGERY
Discharge: HOME | End: 2019-03-12
Attending: SURGERY | Admitting: SURGERY
Payer: MEDICARE

## 2019-03-12 ENCOUNTER — ANESTHESIA (OUTPATIENT)
Dept: OPERATING ROOM | Age: 64
End: 2019-03-12
Payer: MEDICARE

## 2019-03-12 VITALS
RESPIRATION RATE: 16 BRPM | TEMPERATURE: 97 F | SYSTOLIC BLOOD PRESSURE: 128 MMHG | HEART RATE: 64 BPM | BODY MASS INDEX: 28.36 KG/M2 | WEIGHT: 166.13 LBS | HEIGHT: 64 IN | OXYGEN SATURATION: 96 % | DIASTOLIC BLOOD PRESSURE: 65 MMHG

## 2019-03-12 VITALS — SYSTOLIC BLOOD PRESSURE: 122 MMHG | DIASTOLIC BLOOD PRESSURE: 65 MMHG | OXYGEN SATURATION: 97 %

## 2019-03-12 PROCEDURE — 3609027000 HC COLONOSCOPY: Performed by: SURGERY

## 2019-03-12 PROCEDURE — 6360000002 HC RX W HCPCS: Performed by: NURSE ANESTHETIST, CERTIFIED REGISTERED

## 2019-03-12 PROCEDURE — 2709999900 HC NON-CHARGEABLE SUPPLY: Performed by: SURGERY

## 2019-03-12 PROCEDURE — 00812 ANES LWR INTST SCR COLSC: CPT | Performed by: NURSE ANESTHETIST, CERTIFIED REGISTERED

## 2019-03-12 PROCEDURE — 3700000000 HC ANESTHESIA ATTENDED CARE: Performed by: SURGERY

## 2019-03-12 PROCEDURE — G0121 COLON CA SCRN NOT HI RSK IND: HCPCS | Performed by: SURGERY

## 2019-03-12 PROCEDURE — 2580000003 HC RX 258: Performed by: SURGERY

## 2019-03-12 PROCEDURE — 3700000001 HC ADD 15 MINUTES (ANESTHESIA): Performed by: SURGERY

## 2019-03-12 PROCEDURE — 7100000011 HC PHASE II RECOVERY - ADDTL 15 MIN: Performed by: SURGERY

## 2019-03-12 PROCEDURE — 7100000010 HC PHASE II RECOVERY - FIRST 15 MIN: Performed by: SURGERY

## 2019-03-12 PROCEDURE — 2500000003 HC RX 250 WO HCPCS: Performed by: NURSE ANESTHETIST, CERTIFIED REGISTERED

## 2019-03-12 RX ORDER — SODIUM CHLORIDE 0.9 % (FLUSH) 0.9 %
10 SYRINGE (ML) INJECTION EVERY 12 HOURS SCHEDULED
Status: DISCONTINUED | OUTPATIENT
Start: 2019-03-12 | End: 2019-03-12 | Stop reason: HOSPADM

## 2019-03-12 RX ORDER — LIDOCAINE HYDROCHLORIDE 40 MG/ML
INJECTION, SOLUTION RETROBULBAR; TOPICAL PRN
Status: DISCONTINUED | OUTPATIENT
Start: 2019-03-12 | End: 2019-03-12 | Stop reason: SDUPTHER

## 2019-03-12 RX ORDER — SODIUM CHLORIDE 0.9 % (FLUSH) 0.9 %
10 SYRINGE (ML) INJECTION PRN
Status: DISCONTINUED | OUTPATIENT
Start: 2019-03-12 | End: 2019-03-12 | Stop reason: HOSPADM

## 2019-03-12 RX ORDER — PROPOFOL 10 MG/ML
INJECTION, EMULSION INTRAVENOUS PRN
Status: DISCONTINUED | OUTPATIENT
Start: 2019-03-12 | End: 2019-03-12 | Stop reason: SDUPTHER

## 2019-03-12 RX ORDER — SODIUM CHLORIDE, SODIUM LACTATE, POTASSIUM CHLORIDE, CALCIUM CHLORIDE 600; 310; 30; 20 MG/100ML; MG/100ML; MG/100ML; MG/100ML
INJECTION, SOLUTION INTRAVENOUS CONTINUOUS
Status: DISCONTINUED | OUTPATIENT
Start: 2019-03-12 | End: 2019-03-12 | Stop reason: HOSPADM

## 2019-03-12 RX ADMIN — LIDOCAINE HYDROCHLORIDE 80 MG: 40 INJECTION, SOLUTION RETROBULBAR; TOPICAL at 08:47

## 2019-03-12 RX ADMIN — SODIUM CHLORIDE, POTASSIUM CHLORIDE, SODIUM LACTATE AND CALCIUM CHLORIDE: 600; 310; 30; 20 INJECTION, SOLUTION INTRAVENOUS at 08:15

## 2019-03-12 RX ADMIN — PROPOFOL 300 MG: 10 INJECTION, EMULSION INTRAVENOUS at 08:47

## 2019-03-12 ASSESSMENT — PAIN SCALES - GENERAL
PAINLEVEL_OUTOF10: 0

## 2019-03-12 ASSESSMENT — PAIN - FUNCTIONAL ASSESSMENT: PAIN_FUNCTIONAL_ASSESSMENT: 0-10

## 2019-08-01 ENCOUNTER — PATIENT MESSAGE (OUTPATIENT)
Dept: INTERNAL MEDICINE | Age: 64
End: 2019-08-01

## 2019-08-13 ENCOUNTER — HOSPITAL ENCOUNTER (OUTPATIENT)
Dept: LAB | Age: 64
Discharge: HOME | End: 2019-08-13
Payer: MEDICARE

## 2019-08-13 DIAGNOSIS — E55.9 VITAMIN D DEFICIENCY: ICD-10-CM

## 2019-08-13 DIAGNOSIS — R73.02 IMPAIRED GLUCOSE TOLERANCE: ICD-10-CM

## 2019-08-13 DIAGNOSIS — E03.9 HYPOTHYROIDISM, UNSPECIFIED TYPE: ICD-10-CM

## 2019-08-13 DIAGNOSIS — E78.00 HYPERCHOLESTEROLEMIA: ICD-10-CM

## 2019-08-13 LAB
ALBUMIN SERPL-MCNC: 3.9 G/DL (ref 3.5–5.2)
ALBUMIN/GLOBULIN RATIO: 1.2 (ref 1–2.5)
ALP BLD-CCNC: 85 U/L (ref 35–104)
ALT SERPL-CCNC: 29 U/L (ref 5–33)
ANION GAP SERPL CALCULATED.3IONS-SCNC: 10 MMOL/L (ref 9–17)
AST SERPL-CCNC: 21 U/L
BILIRUB SERPL-MCNC: 0.58 MG/DL (ref 0.3–1.2)
BUN BLDV-MCNC: 20 MG/DL (ref 8–23)
BUN/CREAT BLD: 29 (ref 9–20)
CALCIUM SERPL-MCNC: 9.5 MG/DL (ref 8.6–10.4)
CHLORIDE BLD-SCNC: 106 MMOL/L (ref 98–107)
CHOLESTEROL/HDL RATIO: 3.8
CHOLESTEROL: 261 MG/DL
CO2: 27 MMOL/L (ref 20–31)
CREAT SERPL-MCNC: 0.68 MG/DL (ref 0.5–0.9)
GFR AFRICAN AMERICAN: >60 ML/MIN
GFR NON-AFRICAN AMERICAN: >60 ML/MIN
GFR SERPL CREATININE-BSD FRML MDRD: ABNORMAL ML/MIN/{1.73_M2}
GFR SERPL CREATININE-BSD FRML MDRD: ABNORMAL ML/MIN/{1.73_M2}
GLUCOSE BLD-MCNC: 105 MG/DL (ref 70–99)
HDLC SERPL-MCNC: 69 MG/DL
LDL CHOLESTEROL: 166 MG/DL (ref 0–130)
POTASSIUM SERPL-SCNC: 4.4 MMOL/L (ref 3.7–5.3)
SODIUM BLD-SCNC: 143 MMOL/L (ref 135–144)
TOTAL PROTEIN: 7.1 G/DL (ref 6.4–8.3)
TRIGL SERPL-MCNC: 128 MG/DL
TSH SERPL DL<=0.05 MIU/L-ACNC: 6.05 MIU/L (ref 0.3–5)
VITAMIN D 25-HYDROXY: 35.4 NG/ML (ref 30–100)
VLDLC SERPL CALC-MCNC: ABNORMAL MG/DL (ref 1–30)

## 2019-08-13 PROCEDURE — 84443 ASSAY THYROID STIM HORMONE: CPT

## 2019-08-13 PROCEDURE — 80053 COMPREHEN METABOLIC PANEL: CPT

## 2019-08-13 PROCEDURE — 82306 VITAMIN D 25 HYDROXY: CPT

## 2019-08-13 PROCEDURE — 80061 LIPID PANEL: CPT

## 2019-08-13 PROCEDURE — 36415 COLL VENOUS BLD VENIPUNCTURE: CPT

## 2019-08-20 ENCOUNTER — OFFICE VISIT (OUTPATIENT)
Dept: INTERNAL MEDICINE | Age: 64
End: 2019-08-20
Payer: MEDICARE

## 2019-08-20 VITALS
DIASTOLIC BLOOD PRESSURE: 70 MMHG | SYSTOLIC BLOOD PRESSURE: 120 MMHG | HEIGHT: 64 IN | WEIGHT: 169 LBS | BODY MASS INDEX: 28.85 KG/M2 | HEART RATE: 72 BPM

## 2019-08-20 DIAGNOSIS — R79.89 ELEVATED LFTS: ICD-10-CM

## 2019-08-20 DIAGNOSIS — N95.1 MENOPAUSAL SYNDROME: ICD-10-CM

## 2019-08-20 DIAGNOSIS — R73.02 IMPAIRED GLUCOSE TOLERANCE: Primary | ICD-10-CM

## 2019-08-20 DIAGNOSIS — E03.9 HYPOTHYROIDISM, UNSPECIFIED TYPE: ICD-10-CM

## 2019-08-20 DIAGNOSIS — E78.00 HYPERCHOLESTEROLEMIA: ICD-10-CM

## 2019-08-20 DIAGNOSIS — M85.80 OSTEOPENIA, UNSPECIFIED LOCATION: ICD-10-CM

## 2019-08-20 DIAGNOSIS — E55.9 VITAMIN D DEFICIENCY: ICD-10-CM

## 2019-08-20 PROCEDURE — 99214 OFFICE O/P EST MOD 30 MIN: CPT | Performed by: NURSE PRACTITIONER

## 2019-08-20 ASSESSMENT — ENCOUNTER SYMPTOMS
CHEST TIGHTNESS: 0
VOMITING: 0
DIARRHEA: 0
SORE THROAT: 0
SHORTNESS OF BREATH: 0
NAUSEA: 0

## 2019-08-20 ASSESSMENT — PATIENT HEALTH QUESTIONNAIRE - PHQ9
SUM OF ALL RESPONSES TO PHQ QUESTIONS 1-9: 0
1. LITTLE INTEREST OR PLEASURE IN DOING THINGS: 0
SUM OF ALL RESPONSES TO PHQ QUESTIONS 1-9: 0
2. FEELING DOWN, DEPRESSED OR HOPELESS: 0
SUM OF ALL RESPONSES TO PHQ9 QUESTIONS 1 & 2: 0

## 2019-08-20 NOTE — PROGRESS NOTES
Carb-Cholecalciferol (CALCIUM 1000 + D PO) Take 4,000 mg by mouth daily each pill contains Vitamin D3 600 IU      CRANBERRY PO Take 1 tablet by mouth daily.  aspirin 81 MG tablet Take 81 mg by mouth daily       GARLIC Take 022 mg by mouth daily.  Lactobacillus (ACIDOPHILUS PO) Take  by mouth. PB8 acidophilus 1 gm daily.  Ascorbic Acid (VITAMIN C) 1000 MG tablet Take 1,000 mg by mouth daily.  NONFORMULARY Formula [facility] 75 daily. Multivitamin with chelated minerals      Methylsulfonylmethane (MSM PO) Take 3 tablets by mouth daily MSM/Silica combo daily for bone loss. She gets this at [facility]. No current facility-administered medications for this visit. Allergies   Allergen Reactions    Ciprofloxacin Nausea Only    Evista [Raloxifene] Other (See Comments)     Caused joint pain and menopausal symptoms    Statins Other (See Comments)     Lethargy, muscle pain    Other      Bandaids? Left a rash    Sulfa Antibiotics Hives and Rash       Health Maintenance   Topic Date Due    Flu vaccine (1) 09/01/2019    Annual Wellness Visit (AWV)  02/26/2020    TSH testing  08/13/2020    Breast cancer screen  10/30/2020    Diabetes screen  01/05/2021    DTaP/Tdap/Td vaccine (2 - Td) 04/09/2023    Lipid screen  08/13/2024    Colon cancer screen colonoscopy  03/12/2029    DEXA (modify frequency per FRAX score)  Completed    Shingles Vaccine  Completed    Pneumococcal 65+ years Vaccine  Completed    Hepatitis C screen  Completed       Subjective:      Review of Systems   Constitutional: Negative for chills and fever. HENT: Negative for congestion and sore throat. Respiratory: Negative for chest tightness and shortness of breath. Cardiovascular: Negative for chest pain and leg swelling. Gastrointestinal: Negative for diarrhea, nausea and vomiting. Genitourinary: Negative for difficulty urinating and dysuria. Musculoskeletal: Negative for gait problem and myalgias. Osteopenia, unspecified location, stable  6. Menopausal syndrome, stable  - Continue to follow with OBGYN    7. Elevated LFTs, stable  - Discussed use of alcohol in moderation, currently WNL. Will monitor  - Comprehensive Metabolic Panel; Future        Return in about 3 months (around 11/20/2019). Orders Placed This Encounter   Procedures    Comprehensive Metabolic Panel     Standing Status:   Future     Standing Expiration Date:   8/20/2020    Hemoglobin A1C     Standing Status:   Future     Standing Expiration Date:   8/20/2020    TSH With Reflex Ft4     Standing Status:   Future     Standing Expiration Date:   8/20/2020     No orders of the defined types were placed in this encounter. All patient questions answered. Pt voiced understanding.              Electronically signed by JAI Hammond on 8/20/2019 at 8:00 PM

## 2019-10-07 ENCOUNTER — IMMUNIZATION (OUTPATIENT)
Dept: LAB | Age: 64
End: 2019-10-07
Payer: MEDICARE

## 2019-10-07 PROCEDURE — 90653 IIV ADJUVANT VACCINE IM: CPT | Performed by: NURSE PRACTITIONER

## 2019-10-07 PROCEDURE — G0008 ADMIN INFLUENZA VIRUS VAC: HCPCS | Performed by: NURSE PRACTITIONER

## 2019-11-14 ENCOUNTER — HOSPITAL ENCOUNTER (OUTPATIENT)
Dept: LAB | Age: 64
Discharge: HOME | End: 2019-11-14
Payer: MEDICARE

## 2019-11-14 DIAGNOSIS — R73.02 IMPAIRED GLUCOSE TOLERANCE: ICD-10-CM

## 2019-11-14 DIAGNOSIS — R79.89 ELEVATED LFTS: ICD-10-CM

## 2019-11-14 DIAGNOSIS — E03.9 HYPOTHYROIDISM, UNSPECIFIED TYPE: ICD-10-CM

## 2019-11-14 LAB
ALBUMIN SERPL-MCNC: 4.1 G/DL (ref 3.5–5.2)
ALBUMIN/GLOBULIN RATIO: 1.3 (ref 1–2.5)
ALP BLD-CCNC: 86 U/L (ref 35–104)
ALT SERPL-CCNC: 25 U/L (ref 5–33)
ANION GAP SERPL CALCULATED.3IONS-SCNC: 8 MMOL/L (ref 9–17)
AST SERPL-CCNC: 20 U/L
BILIRUB SERPL-MCNC: 0.63 MG/DL (ref 0.3–1.2)
BUN BLDV-MCNC: 18 MG/DL (ref 8–23)
BUN/CREAT BLD: 28 (ref 9–20)
CALCIUM SERPL-MCNC: 9.8 MG/DL (ref 8.6–10.4)
CHLORIDE BLD-SCNC: 106 MMOL/L (ref 98–107)
CO2: 30 MMOL/L (ref 20–31)
CREAT SERPL-MCNC: 0.64 MG/DL (ref 0.5–0.9)
ESTIMATED AVERAGE GLUCOSE: 117 MG/DL
GFR AFRICAN AMERICAN: >60 ML/MIN
GFR NON-AFRICAN AMERICAN: >60 ML/MIN
GFR SERPL CREATININE-BSD FRML MDRD: ABNORMAL ML/MIN/{1.73_M2}
GFR SERPL CREATININE-BSD FRML MDRD: ABNORMAL ML/MIN/{1.73_M2}
GLUCOSE BLD-MCNC: 105 MG/DL (ref 70–99)
HBA1C MFR BLD: 5.7 % (ref 4.8–5.9)
POTASSIUM SERPL-SCNC: 4.3 MMOL/L (ref 3.7–5.3)
SODIUM BLD-SCNC: 144 MMOL/L (ref 135–144)
THYROXINE, FREE: 1.11 NG/DL (ref 0.93–1.7)
TOTAL PROTEIN: 7.3 G/DL (ref 6.4–8.3)
TSH SERPL DL<=0.05 MIU/L-ACNC: 6.19 MIU/L (ref 0.3–5)

## 2019-11-14 PROCEDURE — 36415 COLL VENOUS BLD VENIPUNCTURE: CPT

## 2019-11-14 PROCEDURE — 83036 HEMOGLOBIN GLYCOSYLATED A1C: CPT

## 2019-11-14 PROCEDURE — 84439 ASSAY OF FREE THYROXINE: CPT

## 2019-11-14 PROCEDURE — 84443 ASSAY THYROID STIM HORMONE: CPT

## 2019-11-14 PROCEDURE — 80053 COMPREHEN METABOLIC PANEL: CPT

## 2019-11-21 ENCOUNTER — OFFICE VISIT (OUTPATIENT)
Dept: INTERNAL MEDICINE | Age: 64
End: 2019-11-21
Payer: MEDICARE

## 2019-11-21 VITALS
SYSTOLIC BLOOD PRESSURE: 120 MMHG | HEIGHT: 64 IN | HEART RATE: 76 BPM | WEIGHT: 168 LBS | RESPIRATION RATE: 16 BRPM | BODY MASS INDEX: 28.68 KG/M2 | DIASTOLIC BLOOD PRESSURE: 70 MMHG

## 2019-11-21 DIAGNOSIS — E78.5 DYSLIPIDEMIA: ICD-10-CM

## 2019-11-21 DIAGNOSIS — R73.02 IMPAIRED GLUCOSE TOLERANCE: Primary | ICD-10-CM

## 2019-11-21 DIAGNOSIS — E03.9 HYPOTHYROIDISM, UNSPECIFIED TYPE: ICD-10-CM

## 2019-11-21 DIAGNOSIS — E55.9 VITAMIN D DEFICIENCY: ICD-10-CM

## 2019-11-21 DIAGNOSIS — G57.01 PIRIFORMIS SYNDROME OF RIGHT SIDE: ICD-10-CM

## 2019-11-21 DIAGNOSIS — N95.1 MENOPAUSAL SYNDROME: ICD-10-CM

## 2019-11-21 DIAGNOSIS — M85.80 OSTEOPENIA, UNSPECIFIED LOCATION: ICD-10-CM

## 2019-11-21 PROCEDURE — 99214 OFFICE O/P EST MOD 30 MIN: CPT | Performed by: NURSE PRACTITIONER

## 2019-11-21 ASSESSMENT — PATIENT HEALTH QUESTIONNAIRE - PHQ9
SUM OF ALL RESPONSES TO PHQ9 QUESTIONS 1 & 2: 0
2. FEELING DOWN, DEPRESSED OR HOPELESS: 0
SUM OF ALL RESPONSES TO PHQ QUESTIONS 1-9: 0
SUM OF ALL RESPONSES TO PHQ QUESTIONS 1-9: 0
1. LITTLE INTEREST OR PLEASURE IN DOING THINGS: 0

## 2019-11-21 ASSESSMENT — ENCOUNTER SYMPTOMS
VOMITING: 0
NAUSEA: 0
SHORTNESS OF BREATH: 0
DIARRHEA: 0
SORE THROAT: 0
CHEST TIGHTNESS: 0

## 2020-01-14 ENCOUNTER — HOSPITAL ENCOUNTER (OUTPATIENT)
Dept: CT IMAGING | Age: 65
Discharge: HOME | End: 2020-01-16
Payer: MEDICARE

## 2020-01-14 ENCOUNTER — OFFICE VISIT (OUTPATIENT)
Dept: INTERNAL MEDICINE | Age: 65
End: 2020-01-14
Payer: MEDICARE

## 2020-01-14 VITALS
DIASTOLIC BLOOD PRESSURE: 78 MMHG | HEIGHT: 64 IN | SYSTOLIC BLOOD PRESSURE: 110 MMHG | WEIGHT: 166 LBS | HEART RATE: 80 BPM | BODY MASS INDEX: 28.34 KG/M2 | RESPIRATION RATE: 16 BRPM

## 2020-01-14 PROCEDURE — 99214 OFFICE O/P EST MOD 30 MIN: CPT | Performed by: NURSE PRACTITIONER

## 2020-01-14 PROCEDURE — 70450 CT HEAD/BRAIN W/O DYE: CPT

## 2020-01-14 PROCEDURE — 99213 OFFICE O/P EST LOW 20 MIN: CPT | Performed by: NURSE PRACTITIONER

## 2020-01-14 RX ORDER — PREDNISONE 10 MG/1
TABLET ORAL
Qty: 20 TABLET | Refills: 0 | Status: SHIPPED | OUTPATIENT
Start: 2020-01-14 | End: 2020-03-16 | Stop reason: ALTCHOICE

## 2020-01-14 ASSESSMENT — ENCOUNTER SYMPTOMS
WHEEZING: 0
COUGH: 0
NAUSEA: 0
VOMITING: 0
RHINORRHEA: 0
DIARRHEA: 0

## 2020-01-14 NOTE — PATIENT INSTRUCTIONS
Patient Education        Bell's Palsy: Care Instructions  Your Care Instructions    Bell's palsy is paralysis or weakness of the muscles on one side of the face. Often people with Bell's palsy have a droop on one side of the mouth and have trouble completely shutting the eye on the same side. Bell's palsy can also interfere with the sense of taste. These things happen when a nerve in your face becomes inflamed. Bell's palsy is not caused by a stroke. The cause of the nerve inflammation is not known. But some experts think that a virus may cause it. Because of this, doctors sometimes prescribe antiviral medicine to treat it. You also may get medicine to reduce swelling. Bell's palsy usually gets better on its own in a few weeks or months. Follow-up care is a key part of your treatment and safety. Be sure to make and go to all appointments, and call your doctor if you are having problems. It's also a good idea to know your test results and keep a list of the medicines you take. How can you care for yourself at home? · Take your medicines exactly as prescribed. Call your doctor if you think you are having a problem with your medicine. You will get more details on the specific medicines your doctor prescribes. · Use artificial tears or ointment if your eyes are too dry. Bell's palsy can make your lower eyelid droop, causing a dry eye. · If you cannot completely close your eye, consider using an eye patch while you sleep. · Help yourself blink by using your finger to close and open your eyelid. This may help keep your eye moist.  · Wear glasses or goggles to keep dust and dirt out of your eye. · As feeling comes back to your face, massage your forehead, cheeks, and lips. Massage may make the muscles in your face stronger. · Brush and floss your teeth often to help prevent tooth decay. Bell's palsy can dry up the spit on one side of your mouth. This increases the risk of tooth decay.   When should you call for

## 2020-01-14 NOTE — PROGRESS NOTES
[facility]  [address] [place], [address] Alfreda Atkins  [phone] c/o of Facial Droop (right eye watering started yesterday am, right facial droop started last julio. No other sx)      HPI:     HPI  Patient presents for evaluation of right sided weakness. Symptoms started yesterday morning with some tearing of her R eye, later noticed some weakness of her R facial muscles. She did not initially have problems eating, but today had some problems drooling. She still has taste on both sides of her toungue. Has some numbness in her cheek as well, states she feels like she has been to the dentist and been numb. R ear has some mild numbness/pressure as well. She is currently able to close her right eye, though she has noted some discomfort and tearing. She denies any numbness, tingling, or weakness anywhere else in the body. No problems with her gait. Denies any problems with her speech or thought process. Denies any development of rash. Current Outpatient Medications   Medication Sig Dispense Refill    predniSONE (DELTASONE) 10 MG tablet Two twice daily for 2 days, then three once daily for 2 days, then two once daily for 2 days, then one once daily for 2 days 20 tablet 0    NONFORMULARY Iodine 2%  - 6 drops orally every morning      Omega-3 Fatty Acids (OMEGA 3 PO) Take 3,200 mg by mouth daily      Lutein-Zeaxanthin 25-5 MG CAPS Take 1 capsule by mouth daily      Coenzyme Q10 (CO Q-10) 300 MG CAPS Take 1 capsule by mouth daily      Cinnamon Bark POWD Take 1,200 mg by mouth 2 times daily      UNABLE TO FIND fenugreek 500 mg - 2 tabs BID      Calcium Carb-Cholecalciferol (CALCIUM 1000 + D PO) Take 4,000 mg by mouth daily each pill contains Vitamin D3 600 IU      CRANBERRY PO Take 1 tablet by mouth daily.  aspirin 81 MG tablet Take 81 mg by mouth daily       GARLIC Take 747 mg by mouth daily.  Lactobacillus (ACIDOPHILUS PO) Take  by mouth.  PB8 acidophilus 1 gm daily.  Ascorbic Acid (VITAMIN C) 1000 MG tablet Take 1,000 mg by mouth daily.  NONFORMULARY Formula UVM 75 daily. Multivitamin with chelated minerals      Methylsulfonylmethane (MSM PO) Take 3 tablets by mouth daily MSM/Silica combo daily for bone loss. She gets this at [facility]. No current facility-administered medications for this visit. Allergies   Allergen Reactions    Ciprofloxacin Nausea Only    Evista [Raloxifene] Other (See Comments)     Caused joint pain and menopausal symptoms    Statins Other (See Comments)     Lethargy, muscle pain    Other      Bandaids? Left a rash    Sulfa Antibiotics Hives and Rash       Health Maintenance   Topic Date Due    Annual Wellness Visit (AWV)  02/26/2020    Breast cancer screen  10/30/2020    A1C test (Diabetic or Prediabetic)  11/14/2020    TSH testing  11/14/2020    DTaP/Tdap/Td vaccine (2 - Td) 04/09/2023    Lipid screen  08/13/2024    Colon cancer screen colonoscopy  03/12/2029    DEXA (modify frequency per FRAX score)  Completed    Flu vaccine  Completed    Shingles Vaccine  Completed    Pneumococcal 65+ years Vaccine  Completed    Hepatitis C screen  Completed       Subjective:      Review of Systems   Constitutional: Negative for chills and fever. HENT: Negative for congestion and rhinorrhea. Respiratory: Negative for cough and wheezing. Cardiovascular: Negative for chest pain and leg swelling. Gastrointestinal: Negative for diarrhea, nausea and vomiting. Neurological: Negative for syncope, speech difficulty and headaches. R facial weakness       Objective:     Vitals:    01/14/20 1108   BP: 110/78   Site: Right Upper Arm   Position: Sitting   Cuff Size: Medium Adult   Pulse: 80   Resp: 16   Weight: 166 lb (75.3 kg)   Height: 5' 4\" (1.626 m)     Physical Exam  Constitutional:       Appearance: She is well-developed.    HENT:      Right Ear: Tympanic membrane and ear canal normal. Left Ear: Tympanic membrane and ear canal normal.   Cardiovascular:      Rate and Rhythm: Normal rate and regular rhythm. Pulmonary:      Effort: Pulmonary effort is normal.      Breath sounds: Normal breath sounds. Abdominal:      Palpations: Abdomen is soft. Tenderness: There is no tenderness. Lymphadenopathy:      Cervical: No cervical adenopathy. Neurological:      Mental Status: She is alert and oriented to person, place, and time. Coordination: Coordination is intact. Gait: Gait is intact. Comments: II: PERRLA  III, IV, V:  EOM intact, no nystagmus  V:  Light touch intact bilaterally  VII:  Facial weakness noted on R side of face with facial expression - noted with smile, elevation of brow. Patient is able to close R eye as well as left. Some ptosis noted R eyelid. VIII:  Patient able to hear finger rub, no gait abnormalities  IX, X:  Palatal movement intact  XI:  No abnormalities noted in shoulder elevation  XII:  No abnormalities noted in tongue movement  Patient alert & oriented x 3   strength equal bilaterally  Patellar reflexes 2+ bilaterally  Light sensation peripheral limbs intact bilaterally         Assessment/Plan:        1. Bell's palsy  2. Weakness on right side of face  - Discussed with Dr. Carlo Macias, and agreed further imaging warranted to rule out CVA. Discussed case with radiologist and he advised a CT without contrast as initial imaging. CT results were discussed with Dr. Joni Montanez, and he did not note any intracranial abnormalities that would warrant any additional imaging at this time to rule out CVA, but he did note that further imaging could be considered if symptoms do not improve. Results were reviewed with the patient, and I did advise her to go to the ER if she has any worsening of her symptoms. Will start prednisone taper as noted below, and monitor for improvement. Usual course of disease was discussed with patient.   I did advise her to follow-up with her optometrist if she has any further problems with eye dryness. - predniSONE (DELTASONE) 10 MG tablet; Two twice daily for 2 days, then three once daily for 2 days, then two once daily for 2 days, then one once daily for 2 days  Dispense: 20 tablet; Refill: 0  - CT Head WO Contrast; Future        Return if symptoms worsen or fail to improve. Orders Placed This Encounter   Procedures    CT Head WO Contrast     Standing Status:   Future     Number of Occurrences:   1     Standing Expiration Date:   1/13/2021     Order Specific Question:   Reason for exam:     Answer:   right sided facial weakness, rule out CVA     Orders Placed This Encounter   Medications    predniSONE (DELTASONE) 10 MG tablet     Sig: Two twice daily for 2 days, then three once daily for 2 days, then two once daily for 2 days, then one once daily for 2 days     Dispense:  20 tablet     Refill:  0       All patient questions answered. Pt voiced understanding.              Electronically signed by JAI Gaffney on 1/14/2020 at 1:28 PM

## 2020-01-18 ENCOUNTER — PATIENT MESSAGE (OUTPATIENT)
Dept: INTERNAL MEDICINE | Age: 65
End: 2020-01-18

## 2020-01-20 ENCOUNTER — PATIENT MESSAGE (OUTPATIENT)
Dept: INTERNAL MEDICINE | Age: 65
End: 2020-01-20

## 2020-01-20 NOTE — TELEPHONE ENCOUNTER
From: Kevin Calvillo  To: SCOTT Blake - CNP  Sent: 1/20/2020 12:13 PM EST  Subject: Prescription Question    I'm glad to hear it will be out of my system soon. I believe Hernandez's Palsy is a lesson in patience. I'm hangin' in there.

## 2020-03-09 ENCOUNTER — HOSPITAL ENCOUNTER (OUTPATIENT)
Dept: LAB | Age: 65
Discharge: HOME | End: 2020-03-09
Payer: MEDICARE

## 2020-03-09 LAB — TSH SERPL DL<=0.05 MIU/L-ACNC: 4.96 MIU/L (ref 0.3–5)

## 2020-03-09 PROCEDURE — 36415 COLL VENOUS BLD VENIPUNCTURE: CPT

## 2020-03-09 PROCEDURE — 84443 ASSAY THYROID STIM HORMONE: CPT

## 2020-03-16 ENCOUNTER — OFFICE VISIT (OUTPATIENT)
Dept: INTERNAL MEDICINE | Age: 65
End: 2020-03-16
Payer: MEDICARE

## 2020-03-16 VITALS
WEIGHT: 167.4 LBS | SYSTOLIC BLOOD PRESSURE: 118 MMHG | HEART RATE: 64 BPM | DIASTOLIC BLOOD PRESSURE: 74 MMHG | HEIGHT: 64 IN | RESPIRATION RATE: 18 BRPM | TEMPERATURE: 98.7 F | BODY MASS INDEX: 28.58 KG/M2

## 2020-03-16 PROCEDURE — G0444 DEPRESSION SCREEN ANNUAL: HCPCS | Performed by: NURSE PRACTITIONER

## 2020-03-16 PROCEDURE — G0439 PPPS, SUBSEQ VISIT: HCPCS | Performed by: NURSE PRACTITIONER

## 2020-03-16 PROCEDURE — 99213 OFFICE O/P EST LOW 20 MIN: CPT | Performed by: NURSE PRACTITIONER

## 2020-03-16 PROCEDURE — 99212 OFFICE O/P EST SF 10 MIN: CPT

## 2020-03-16 ASSESSMENT — ENCOUNTER SYMPTOMS
DIARRHEA: 0
SORE THROAT: 0
CHEST TIGHTNESS: 0
VOMITING: 0
SHORTNESS OF BREATH: 0
NAUSEA: 0

## 2020-03-16 ASSESSMENT — LIFESTYLE VARIABLES
HOW OFTEN DO YOU HAVE A DRINK CONTAINING ALCOHOL: 4
HOW MANY STANDARD DRINKS CONTAINING ALCOHOL DO YOU HAVE ON A TYPICAL DAY: 0
AUDIT-C TOTAL SCORE: 4
HOW OFTEN DO YOU HAVE SIX OR MORE DRINKS ON ONE OCCASION: 0

## 2020-03-16 ASSESSMENT — PATIENT HEALTH QUESTIONNAIRE - PHQ9: SUM OF ALL RESPONSES TO PHQ QUESTIONS 1-9: 5

## 2020-03-16 NOTE — PATIENT INSTRUCTIONS
Personal[facility] - 3/16/2020  Medicare offers a range of preventive health benefits. Some of the tests and screenings are paid in full while other may be subject to a deductible, co-insurance, and/or copay. Some of these benefits include a comprehensive review of your medical history including lifestyle, illnesses that may run in your family, and various assessments and screenings as appropriate. After reviewing your medical record and screening and assessments performed today your provider may have ordered immunizations, labs, imaging, and/or referrals for you. A list of these orders (if applicable) as well as your Preventive Care list are included within your After Visit Summary for your review. Other Preventive Recommendations:    · A preventive eye exam performed by an eye specialist is recommended every 1-2 years to screen for glaucoma; cataracts, macular degeneration, and other eye disorders. · A preventive dental visit is recommended every 6 months. · Try to get at least 150 minutes of exercise per week or 10,000 steps per day on a pedometer . · Order or download the FREE \"Exercise & Physical Activity: Your Everyday Guide\" from [employer]. Call 5-105.571.1197 or search [employer] online. · You need 5230-6985 mg of calcium and 6226-1153 IU of vitamin D per day. It is possible to meet your calcium requirement with diet alone, but a vitamin D supplement is usually necessary to meet this goal.  · When exposed to the sun, use a sunscreen that protects against both UVA and UVB radiation with an SPF of 30 or greater. Reapply every 2 to 3 hours or after sweating, drying off with a towel, or swimming. · Always wear a seat belt when traveling in a car. Always wear a helmet when riding a bicycle or motorcycle. [facility]  Medicare offers a range of preventive health benefits.  Some of the tests and screenings are paid in full while other may be subject to a deductible, co-insurance, and/or copay. Some of these benefits include a comprehensive review of your medical history including lifestyle, illnesses that may run in your family, and various assessments and screenings as appropriate. After reviewing your medical record and screening and assessments performed today your provider may have ordered immunizations, labs, imaging, and/or referrals for you. A list of these orders (if applicable) as well as your Preventive Care list are included within your After Visit Summary for your review. Other Preventive Recommendations:    A preventive eye exam performed by an eye specialist is recommended every 1-2 years to screen for glaucoma; cataracts, macular degeneration, and other eye disorders. A preventive dental visit is recommended every 6 months. Try to get at least 150 minutes of exercise per week or 10,000 steps per day on a pedometer . Order or download the FREE \"Exercise & Physical Activity: Your Everyday Guide\" from [employer]. Call 6-442.215.4649 or search [employer] online. You need 9854-9697 mg of calcium and 1277-4173 IU of vitamin D per day. It is possible to meet your calcium requirement with diet alone, but a vitamin D supplement is usually necessary to meet this goal.  When exposed to the sun, use a sunscreen that protects against both UVA and UVB radiation with an SPF of 30 or greater. Reapply every 2 to 3 hours or after sweating, drying off with a towel, or swimming. Always wear a seat belt when traveling in a car. Always wear a helmet when riding a bicycle or motorcycle. Patient Education        Preventing Falls: Care Instructions  Your Care Instructions    Getting around your home safely can be a challenge if you have injuries or health problems that make it easy for you to fall.  Loose rugs and furniture in walkways are among the dangers for many older people who have problems walking or who have poor eyesight. People who have conditions such as arthritis, osteoporosis, or dementia also have to be careful not to fall. You can make your home safer with a few simple measures. Follow-up care is a key part of your treatment and safety. Be sure to make and go to all appointments, and call your doctor if you are having problems. It's also a good idea to know your test results and keep a list of the medicines you take. How can you care for yourself at home? Taking care of yourself  You may get dizzy if you do not drink enough water. To prevent dehydration, drink plenty of fluids, enough so that your urine is light yellow or clear like water. Choose water and other caffeine-free clear liquids. If you have kidney, heart, or liver disease and have to limit fluids, talk with your doctor before you increase the amount of fluids you drink. Exercise regularly to improve your strength, muscle tone, and balance. Walk if you can. Swimming may be a good choice if you cannot walk easily. Have your vision and hearing checked each year or any time you notice a change. If you have trouble seeing and hearing, you might not be able to avoid objects and could lose your balance. Know the side effects of the medicines you take. Ask your doctor or pharmacist whether the medicines you take can affect your balance. Sleeping pills or sedatives can affect your balance. Limit the amount of alcohol you drink. Alcohol can impair your balance and other senses. Ask your doctor whether calluses or corns on your feet need to be removed. If you wear loose-fitting shoes because of calluses or corns, you can lose your balance and fall. Talk to your doctor if you have numbness in your feet. Preventing falls at home  Remove raised doorway thresholds, throw rugs, and clutter. Repair loose carpet or raised areas in the floor.   Move furniture and electrical cords to keep them out of walking paths. Use nonskid floor wax, and wipe up spills right away, especially on ceramic tile floors. If you use a walker or cane, put rubber tips on it. If you use crutches, clean the bottoms of them regularly with an abrasive pad, such as steel wool. Keep your house well lit, especially stairways, porches, and outside walkways. Use night-lights in areas such as hallways and bathrooms. Add extra light switches or use remote switches (such as switches that go on or off when you clap your hands) to make it easier to turn lights on if you have to get up during the night. Install sturdy handrails on stairways. Move items in your cabinets so that the things you use a lot are on the lower shelves (about waist level). Keep a cordless phone and a flashlight with new batteries by your bed. If possible, put a phone in each of the main rooms of your house, or carry a cell phone in case you fall and cannot reach a phone. Or, you can wear a device around your neck or wrist. You push a button that sends a signal for help. Wear low-heeled shoes that fit well and give your feet good support. Use footwear with nonskid soles. Check the heels and soles of your shoes for wear. Repair or replace worn heels or soles. Do not wear socks without shoes on wood floors. Walk on the grass when the sidewalks are slippery. If you live in an area that gets snow and ice in the winter, sprinkle salt on slippery steps and sidewalks. Preventing falls in the bath  Install grab bars and nonskid mats inside and outside your shower or tub and near the toilet and sinks. Use shower chairs and bath benches. Use a hand-held shower head that will allow you to sit while showering. Get into a tub or shower by putting the weaker leg in first. Get out of a tub or shower with your strong side first.  Repair loose toilet seats and consider installing a raised toilet seat to make getting on and off the toilet easier.   Keep your bathroom door unlocked while you are in the shower. Where can you learn more? Go to [email or web address] and sign in to your [facility] account. Enter [phone] in the [facility] box to learn more about \"Preventing Falls: Care Instructions. \"     If you do not have an account, please click on the \"Sign Up Now\" link. Current as of: August 6, 2019Content Version: 12.4  © 1629-3706 [employer]. Care instructions adapted under license by [facility] ([facility]). If you have questions about a medical condition or this instruction, always ask your healthcare professional. [address] any warranty or liability for your use of this information.

## 2020-03-16 NOTE — PROGRESS NOTES
aspirin 81 MG tablet Take 81 mg by mouth daily  Yes Historical Provider, MD   GARLIC Take 138 mg by mouth daily. Yes Historical Provider, MD   Lactobacillus (ACIDOPHILUS PO) Take 420 mg by mouth PB8 acidophilus 1 gm daily. Yes Historical Provider, MD   Ascorbic Acid (VITAMIN C) 1000 MG tablet Take 1,000 mg by mouth daily. Yes Historical Provider, MD   NONFORMULARY Formula [facility] 75 daily. Multivitamin with chelated minerals Yes Historical Provider, MD   Methylsulfonylmethane (MSM PO) Take 3 tablets by mouth daily MSM/Silica combo daily for bone loss. She gets this at [facility]. Yes Historical Provider, MD       Past Medical History:   Diagnosis Date    Hypercholesterolemia     Hypothyroidism     Subacute, not currently on treeatment.  Impaired glucose tolerance     Prediabetes/stable and checking A1C yearly    Menopausal syndrome     Mitral regurgitation     Echo  mild MR    Osteopenia     Dexa  FRAX 1% at hip, T -1.5 Hip    Vitamin D deficiency        Past Surgical History:   Procedure Laterality Date    BLADDER SUSPENSION      In the past for a cystocele.  BREAST BIOPSY Right     Negative.  COLONOSCOPY N/A 3/12/2019    COLONOSCOPY performed by Elias Ridley DO at [address] [place] [address]      Was done due to a cystocele several years ago and was negagtive.  DILATION AND CURETTAGE OF UTERUS      With hysteroscopy.  ENDOMETRIAL ABLATION      At age 44.  SIGMOIDOSCOPY      At age 52. Was negative per patien, although she did not have a followup barium enema with this for unknown reasons.     TUBAL LIGATION         Family History   Problem Relation Age of Onset   Kenji Bull Alzheimer's Disease Mother     Diabetes Mother         [de-identified]    Hypertension Father     High Cholesterol Father     Stroke Father     Coronary Art Dis Father          - arterial sclerosis    Heart Attack Father     High Cholesterol Brother     Other Child         Ankylosing spondylitis who takes Humira and sees rheumatology regularly. CareTeam (Including outside providers/suppliers regularly involved in providing care):   Patient Care Team:  SCOTT Ivy CNP as PCP - General (Family Nurse Practitioner)  SCOTT Ivy CNP as PCP - [facility] Empaneled Provider    Wt Readings from Last 3 Encounters:   03/16/20 167 lb 6.4 oz (75.9 kg)   01/14/20 166 lb (75.3 kg)   11/21/19 168 lb (76.2 kg)     Vitals:    03/16/20 0803   BP: 118/74   Pulse: 64   Resp: 18   Temp: 98.7 °F (37.1 °C)   Weight: 167 lb 6.4 oz (75.9 kg)   Height: 5' 4.02\" (1.626 m)     Body mass index is 28.72 kg/m². Based upon direct observation of the patient, evaluation of cognition reveals NONE  . Patient's complete Health Risk Assessment and screening values have been reviewed and are found in Flowsheets. The following problems were reviewed today and where indicated follow up appointments were made and/or referrals ordered. Positive Risk Factor Screenings with Interventions:     General Health:  General  In general, how would you say your health is?: Good  In the past 7 days, have you experienced any of the following?  New or Increased Pain, New or Increased Fatigue, Loneliness, Social Isolation, Stress or Anger?: (!) Stress  Do you get the social and emotional support that you need?: Yes  Do you have a Living Will?: (!) No  General Health Risk Interventions:  · Stress: patient declines any further evaluation/treatment for this issue  · No Living Will: patient declined    Hearing/Vision:  No exam data present  Hearing/Vision  Do you or your family notice any trouble with your hearing?: (!) Yes  Do you have difficulty driving, watching TV, or doing any of your daily activities because of your eyesight?: (!) Yes  Have you had an eye exam within the past year?: Yes  Hearing/Vision Interventions:  · Hearing concerns:  patient declines any further evaluation/treatment for hearing issues  · Vision concerns:  patient encouraged to make appointment with his/her eye specialist    Safety:  Safety  Do you have working smoke detectors?: Yes  Have all throw rugs been removed or fastened?: (!) No  Do you have non-slip mats or surfaces in all bathtubs/showers?: (!) No  Do all of your stairways have a railing or banister?: (!) No  Are your doorways, halls and stairs free of clutter?: Yes  Do you always fasten your seatbelt when you are in a car?: (!) No  Safety Interventions:  · Home safety tips provided    Personalized Preventive Plan   Current Health Maintenance Status  Immunization History   Administered Date(s) Administered    Influenza Vaccine, unspecified formulation 09/21/2013, 10/04/2014, 10/10/2015, 10/08/2016    Influenza Virus Vaccine 09/21/2013, 10/04/2014, 10/10/2015, 10/03/2017    Influenza, High Dose (Fluzone 65 yrs and older) 10/09/2018    Influenza, MDCK Quadv, IM, PF (Flucelvax 4 yrs and older) 10/03/2017    Influenza, Triv, inactivated, subunit, adjuvanted, IM (Fluad 65 yrs and older) 10/07/2019    Pneumococcal Conjugate 13-valent ([id number]) 02/16/2018    Pneumococcal Polysaccharide ([id number]) 02/14/2019    Tdap (Boostrix, Adacel) 04/09/2013    Zoster Live (Zostavax) 04/09/2013    Zoster Recombinant (Shingrix) 03/01/2018, 10/31/2018        Health Maintenance   Topic Date Due    Annual Wellness Visit (AWV)  05/29/2019    Breast cancer screen  10/30/2020    A1C test (Diabetic or Prediabetic)  11/14/2020    TSH testing  03/09/2021    DTaP/Tdap/Td vaccine (2 - Td) 04/09/2023    Lipid screen  08/13/2024    Colon cancer screen colonoscopy  03/12/2029    DEXA (modify frequency per FRAX score)  Completed    Flu vaccine  Completed    Shingles Vaccine  Completed    Pneumococcal 65+ years Vaccine  Completed    Hepatitis C screen  Completed    Hepatitis A vaccine  Aged Out    Hepatitis B vaccine  Aged Out    Hib vaccine  Aged Out    Meningococcal (ACWY) vaccine  Aged Out     Recommendations for

## 2020-03-16 NOTE — PROGRESS NOTES
Medicare Annual Wellness Visit  Name: Hayley Alejandro Date: 3/16/2020   MRN: [id number] Sex: Female   Age: 77 y.o. Ethnicity: Non-/Non    : 1955 Race: Javier Calvillo is here for [employer] (AWV/4mo); Blood Sugar Problem; Hyperlipidemia; and Hypothyroidism    Screenings for behavioral, psychosocial and functional/safety risks, and cognitive dysfunction are all negative except as indicated below. These results, as well as other patient data from the [address] form, are documented in Flowsheets linked to this Encounter. Allergies   Allergen Reactions    Ciprofloxacin Nausea Only    Evista [Raloxifene] Other (See Comments)     Caused joint pain and menopausal symptoms    Statins Other (See Comments)     Lethargy, muscle pain    Other      Bandaids? Left a rash    Sulfa Antibiotics Hives and Rash         Prior to Visit Medications    Medication Sig Taking?  Authorizing Provider   MILK THISTLE PO Take 100 mg by mouth 2 times daily Yes Historical Provider, MD   VITAMIN E PO Take 268 mg by mouth daily Yes Historical Provider, MD   Plant Sterols and Stanols (CHOLESTOFF PO) Take 2 capsules by mouth 2 times daily Yes Historical Provider, MD   NONFORMULARY Iodine 2%  - 6 drops orally every morning Yes Historical Provider, MD   Omega-3 Fatty Acids (OMEGA 3 PO) Take 3,200 mg by mouth daily Yes Historical Provider, MD   Lutein-Zeaxanthin 25-5 MG CAPS Take 1 capsule by mouth daily Yes Historical Provider, MD   Coenzyme Q10 (CO Q-10) 300 MG CAPS Take 1 capsule by mouth daily Yes Historical Provider, MD   Cinnamon Bark POWD Take 1,200 mg by mouth 2 times daily Yes Historical Provider, MD   UNABLE TO FIND fenugreek 500 mg - 2 tabs BID Yes Historical Provider, MD   Calcium Carb-Cholecalciferol (CALCIUM 1000 + D PO) Take 4,000 mg by mouth daily each pill contains Vitamin D3 600 IU Yes Historical Provider, MD   CRANBERRY PO Take 650 mg by mouth daily  Yes Historical Provider, MD   aspirin 81 MG tablet Take 81 mg by mouth daily  Yes Historical Provider, MD   GARLIC Take 577 mg by mouth daily. Yes Historical Provider, MD   Lactobacillus (ACIDOPHILUS PO) Take 420 mg by mouth PB8 acidophilus 1 gm daily. Yes Historical Provider, MD   Ascorbic Acid (VITAMIN C) 1000 MG tablet Take 1,000 mg by mouth daily. Yes Historical Provider, MD   NONFORMULARY Formula UVM 75 daily. Multivitamin with chelated minerals Yes Historical Provider, MD   Methylsulfonylmethane (MSM PO) Take 3 tablets by mouth daily MSM/Silica combo daily for bone loss. She gets this at [facility]. Yes Historical Provider, MD         Past Medical History:   Diagnosis Date    Hypercholesterolemia     Hypothyroidism     Subacute, not currently on treeatment.  Impaired glucose tolerance     Prediabetes/stable and checking A1C yearly    Menopausal syndrome     Mitral regurgitation     Echo  mild MR    Osteopenia     Dexa  FRAX 1% at hip, T -1.5 Hip    Vitamin D deficiency        Past Surgical History:   Procedure Laterality Date    BLADDER SUSPENSION      In the past for a cystocele.  BREAST BIOPSY Right     Negative.  COLONOSCOPY N/A 3/12/2019    COLONOSCOPY performed by Rebecka Holter, DO at [address] [place] [address]      Was done due to a cystocele several years ago and was negagtive.  DILATION AND CURETTAGE OF UTERUS      With hysteroscopy.  ENDOMETRIAL ABLATION      At age 44.  SIGMOIDOSCOPY      At age 52. Was negative per patien, although she did not have a followup barium enema with this for unknown reasons.     TUBAL LIGATION           Family History   Problem Relation Age of Onset   [facility] Alzheimer's Disease Mother     Diabetes Mother         [de-identified]    Hypertension Father     High Cholesterol Father     Stroke Father     Coronary Art Dis Father          - arterial sclerosis    Heart Attack Father     High Cholesterol Brother     Other Child         Ankylosing spondylitis who takes Humira and sees rheumatology regularly. CareTeam (Including outside providers/suppliers regularly involved in providing care):   Patient Care Team:  SCOTT Villanueva CNP as PCP - General (Family Nurse Practitioner)  SCOTT Villanueva CNP as PCP - [facility] Empaneled Provider    Wt Readings from Last 3 Encounters:   03/16/20 167 lb 6.4 oz (75.9 kg)   01/14/20 166 lb (75.3 kg)   11/21/19 168 lb (76.2 kg)     Vitals:    03/16/20 0803   BP: 118/74   Pulse: 64   Resp: 18   Temp: 98.7 °F (37.1 °C)   Weight: 167 lb 6.4 oz (75.9 kg)   Height: 5' 4.02\" (1.626 m)     Body mass index is 28.72 kg/m². Based upon direct observation of the patient, evaluation of cognition reveals recent and remote memory intact. Patient's complete Health Risk Assessment and screening values have been reviewed and are found in Flowsheets. The following problems were reviewed today and where indicated follow up appointments were made and/or referrals ordered. Positive Risk Factor Screenings with Interventions:     General Health:  General  In general, how would you say your health is?: Good  In the past 7 days, have you experienced any of the following?  New or Increased Pain, New or Increased Fatigue, Loneliness, Social Isolation, Stress or Anger?: (!) Stress  Do you get the social and emotional support that you need?: Yes  Do you have a Living Will?: (!) No  General Health Risk Interventions:  · Stress: patient declines any further evaluation/treatment for this issue  · No Living Will: additional information provided regarding [facility] services    Hearing/Vision:  No exam data present  Hearing/Vision  Do you or your family notice any trouble with your hearing?: (!) Yes  Do you have difficulty driving, watching TV, or doing any of your daily activities because of your eyesight?: (!) Yes  Have you had an eye exam within the past year?: Yes  Hearing/Vision Interventions:  · Hearing concerns:  patient declines any Pneumococcal 65+ years Vaccine  Completed    Hepatitis C screen  Completed    Hepatitis A vaccine  Aged Out    Hepatitis B vaccine  Aged Out    Hib vaccine  Aged Out    Meningococcal (ACWY) vaccine  Aged Out       Subjective:      Review of Systems   Constitutional: Negative for chills and fever. HENT: Negative for congestion and sore throat. Respiratory: Negative for chest tightness and shortness of breath. Cardiovascular: Negative for chest pain and leg swelling. Gastrointestinal: Negative for diarrhea, nausea and vomiting. Genitourinary: Negative for difficulty urinating and dysuria. Musculoskeletal: Negative for gait problem and myalgias. Neurological: Negative for dizziness and headaches. Psychiatric/Behavioral: Negative for confusion and decreased concentration. Objective:     Vitals:    03/16/20 0803   BP: 118/74   Pulse: 64   Resp: 18   Temp: 98.7 °F (37.1 °C)   Weight: 167 lb 6.4 oz (75.9 kg)   Height: 5' 4.02\" (1.626 m)     Physical Exam  Vitals signs and nursing note reviewed. Constitutional:       General: She is not in acute distress. Appearance: She is well-developed. HENT:      Head: Normocephalic and atraumatic. Right Ear: Tympanic membrane and external ear normal.      Left Ear: Tympanic membrane and external ear normal.      Nose: Nose normal. No nasal deformity or rhinorrhea. Right Sinus: No maxillary sinus tenderness or frontal sinus tenderness. Left Sinus: No maxillary sinus tenderness or frontal sinus tenderness. Mouth/Throat:      Pharynx: No oropharyngeal exudate or posterior oropharyngeal erythema. Eyes:      General: Lids are normal.      Extraocular Movements: Extraocular movements intact. Conjunctiva/sclera: Conjunctivae normal.   Neck:      Musculoskeletal: Neck supple. Thyroid: No thyromegaly. Cardiovascular:      Rate and Rhythm: Normal rate and regular rhythm. Heart sounds: No murmur.    Pulmonary:      Effort: Pulmonary effort is normal. No accessory muscle usage or respiratory distress. Breath sounds: Normal breath sounds. No wheezing, rhonchi or rales. Abdominal:      Palpations: Abdomen is soft. Tenderness: There is no abdominal tenderness. There is no guarding or rebound. Musculoskeletal: Normal range of motion. Right lower leg: No edema. Left lower leg: No edema. Lymphadenopathy:      Cervical: No cervical adenopathy. Skin:     General: Skin is warm and dry. Nails: There is no clubbing. Neurological:      Mental Status: She is alert and oriented to person, place, and time. Coordination: Coordination normal.      Gait: Gait normal.   Psychiatric:         Mood and Affect: Mood normal.         Speech: Speech normal.         Behavior: Behavior normal.         Assessment/Plan:        1. Routine general medical examination at a health care facility  - As noted above    2. Impaired glucose tolerance,  stable  - Continue to monitor    3. Dyslipidemia, stable  - ASCVD has been borderline at 5.7%, previously did not tolerate statins well. Will continue to work on diet and weight loss and recheck prior to next visit  - Comprehensive Metabolic Panel; Future  - Lipid Panel; Future  - CBC Auto Differential; Future    4. Hypothyroidism, unspecified type,  stable  - Continue to monitor, most recent TSH WNL without medication    5. Vitamin D deficiency,  stable  - Continue to monitor  - Vitamin D 25 Hydroxy; Future    6. Menopausal syndrome, stable  7. Osteopenia, unspecified location, stable  - Continue to follow with OBGYN        Return in about 6 months (around 9/16/2020) for Medicare Annual Wellness Visit in 1 year.     Orders Placed This Encounter   Procedures    Comprehensive Metabolic Panel     Standing Status:   Future     Standing Expiration Date:   3/16/2021    Lipid Panel     Standing Status:   Future     Standing Expiration Date:   3/16/2021     Order Specific Question:   Is Patient Fasting?/# of Hours     Answer:   8    CBC Auto Differential     Standing Status:   Future     Standing Expiration Date:   3/16/2021    Vitamin D 25 Hydroxy     Standing Status:   Future     Standing Expiration Date:   3/16/2021     No orders of the defined types were placed in this encounter. All patient questions answered. Pt voiced understanding.              Electronically signed by JAI Anaya on 3/16/2020 at 8:57 AM

## 2020-06-04 ENCOUNTER — CLINICAL DOCUMENTATION (OUTPATIENT)
Dept: SPIRITUAL SERVICES | Age: 65
End: 2020-06-04

## 2020-09-03 ENCOUNTER — HOSPITAL ENCOUNTER (OUTPATIENT)
Dept: LAB | Age: 65
Discharge: HOME | End: 2020-09-03
Payer: MEDICARE

## 2020-09-03 LAB
ABSOLUTE EOS #: 0.19 K/UL (ref 0–0.44)
ABSOLUTE IMMATURE GRANULOCYTE: <0.03 K/UL (ref 0–0.3)
ABSOLUTE LYMPH #: 1.57 K/UL (ref 1.1–3.7)
ABSOLUTE MONO #: 0.37 K/UL (ref 0.1–1.2)
ALBUMIN SERPL-MCNC: 3.9 G/DL (ref 3.5–5.2)
ALBUMIN/GLOBULIN RATIO: 1.3 (ref 1–2.5)
ALP BLD-CCNC: 93 U/L (ref 35–104)
ALT SERPL-CCNC: 29 U/L (ref 5–33)
ANION GAP SERPL CALCULATED.3IONS-SCNC: 8 MMOL/L (ref 9–17)
AST SERPL-CCNC: 27 U/L
BASOPHILS # BLD: 1 % (ref 0–2)
BASOPHILS ABSOLUTE: 0.05 K/UL (ref 0–0.2)
BILIRUB SERPL-MCNC: 0.65 MG/DL (ref 0.3–1.2)
BUN BLDV-MCNC: 16 MG/DL (ref 8–23)
BUN/CREAT BLD: 23 (ref 9–20)
CALCIUM SERPL-MCNC: 9.5 MG/DL (ref 8.6–10.4)
CHLORIDE BLD-SCNC: 107 MMOL/L (ref 98–107)
CHOLESTEROL/HDL RATIO: 4.3
CHOLESTEROL: 270 MG/DL
CO2: 26 MMOL/L (ref 20–31)
CREAT SERPL-MCNC: 0.71 MG/DL (ref 0.5–0.9)
DIFFERENTIAL TYPE: NORMAL
EOSINOPHILS RELATIVE PERCENT: 4 % (ref 1–4)
GFR AFRICAN AMERICAN: >60 ML/MIN
GFR NON-AFRICAN AMERICAN: >60 ML/MIN
GFR SERPL CREATININE-BSD FRML MDRD: ABNORMAL ML/MIN/{1.73_M2}
GFR SERPL CREATININE-BSD FRML MDRD: ABNORMAL ML/MIN/{1.73_M2}
GLUCOSE BLD-MCNC: 103 MG/DL (ref 70–99)
HCT VFR BLD CALC: 45 % (ref 36.3–47.1)
HDLC SERPL-MCNC: 63 MG/DL
HEMOGLOBIN: 14.6 G/DL (ref 11.9–15.1)
IMMATURE GRANULOCYTES: 0 %
LDL CHOLESTEROL: 184 MG/DL (ref 0–130)
LYMPHOCYTES # BLD: 31 % (ref 24–43)
MCH RBC QN AUTO: 29.8 PG (ref 25.2–33.5)
MCHC RBC AUTO-ENTMCNC: 32.4 G/DL (ref 25.2–33.5)
MCV RBC AUTO: 91.8 FL (ref 82.6–102.9)
MONOCYTES # BLD: 7 % (ref 3–12)
NRBC AUTOMATED: 0 PER 100 WBC
PDW BLD-RTO: 13.2 % (ref 11.8–14.4)
PLATELET # BLD: 186 K/UL (ref 138–453)
PLATELET ESTIMATE: NORMAL
PMV BLD AUTO: 9.5 FL (ref 8.1–13.5)
POTASSIUM SERPL-SCNC: 4.1 MMOL/L (ref 3.7–5.3)
RBC # BLD: 4.9 M/UL (ref 3.95–5.11)
RBC # BLD: NORMAL 10*6/UL
SEG NEUTROPHILS: 57 % (ref 36–65)
SEGMENTED NEUTROPHILS ABSOLUTE COUNT: 2.89 K/UL (ref 1.5–8.1)
SODIUM BLD-SCNC: 141 MMOL/L (ref 135–144)
TOTAL PROTEIN: 6.9 G/DL (ref 6.4–8.3)
TRIGL SERPL-MCNC: 113 MG/DL
VITAMIN D 25-HYDROXY: 33.3 NG/ML (ref 30–100)
VLDLC SERPL CALC-MCNC: ABNORMAL MG/DL (ref 1–30)
WBC # BLD: 5.1 K/UL (ref 3.5–11.3)
WBC # BLD: NORMAL 10*3/UL

## 2020-09-03 PROCEDURE — 85025 COMPLETE CBC W/AUTO DIFF WBC: CPT

## 2020-09-03 PROCEDURE — 80061 LIPID PANEL: CPT

## 2020-09-03 PROCEDURE — 80053 COMPREHEN METABOLIC PANEL: CPT

## 2020-09-03 PROCEDURE — 82306 VITAMIN D 25 HYDROXY: CPT

## 2020-09-03 PROCEDURE — 36415 COLL VENOUS BLD VENIPUNCTURE: CPT

## 2020-09-04 ENCOUNTER — IMMUNIZATION (OUTPATIENT)
Dept: LAB | Age: 65
End: 2020-09-04
Payer: MEDICARE

## 2020-09-04 PROCEDURE — 90694 VACC AIIV4 NO PRSRV 0.5ML IM: CPT | Performed by: NURSE PRACTITIONER

## 2020-09-04 PROCEDURE — G0008 ADMIN INFLUENZA VIRUS VAC: HCPCS | Performed by: NURSE PRACTITIONER

## 2020-09-10 ENCOUNTER — TELEPHONE (OUTPATIENT)
Dept: INTERNAL MEDICINE | Age: 65
End: 2020-09-10

## 2020-09-10 ENCOUNTER — OFFICE VISIT (OUTPATIENT)
Dept: INTERNAL MEDICINE | Age: 65
End: 2020-09-10
Payer: MEDICARE

## 2020-09-10 VITALS
DIASTOLIC BLOOD PRESSURE: 64 MMHG | RESPIRATION RATE: 16 BRPM | WEIGHT: 162 LBS | BODY MASS INDEX: 27.66 KG/M2 | HEIGHT: 64 IN | SYSTOLIC BLOOD PRESSURE: 120 MMHG | HEART RATE: 72 BPM

## 2020-09-10 PROCEDURE — 99214 OFFICE O/P EST MOD 30 MIN: CPT | Performed by: NURSE PRACTITIONER

## 2020-09-10 RX ORDER — ANTIOX #8/OM3/DHA/EPA/LUT/ZEAX 250-2.5 MG
1 CAPSULE ORAL 2 TIMES DAILY
COMMUNITY

## 2020-09-10 RX ORDER — MELATONIN
1000 DAILY
Qty: 90 TABLET | Refills: 1
Start: 2020-09-10

## 2020-09-10 ASSESSMENT — ENCOUNTER SYMPTOMS
VOMITING: 0
SHORTNESS OF BREATH: 0
DIARRHEA: 0
SORE THROAT: 0
CHEST TIGHTNESS: 0
NAUSEA: 0

## 2020-09-10 NOTE — PROGRESS NOTES
[facility] Internal Medicine  [address] [facility] [place], [address]  [phone]      [address] c/o of Blood Sugar Problem ([facility]- 6 month appt) and Hyperlipidemia (6 month appt)      HPI:     HPI  Patient presents for evaluation and management of chronic medical conditions as noted below. Impaired fasting glucose is well controlled with diet. Discussed hyperlipidemia, ASCVD 6.6%. Currently taking omega-3/fish oils. Recommended statin, patient declines at this time. Denies chest pain or dyspnea. Does have a history of hypothyroidism, but recently TSH has been WNL without medication. Denies fatigue or weight gain. Vitamin D is borderline at 35, we did discuss increasing her supplementation. She follows with OBGYN for her osteopenia and menopausal symptoms. Her Bell's palsy symptoms have improved considerably, but have not completed resolved at this time. Symptoms initially started 8 months ago. She did have CT at the time of symptom onset to rule out CVA, which was unremarakble. No difficulty closing eye or other ophthalmalgic concerns.       The 10-year ASCVD risk score (Devi Nuñez, et al., 2013) is: 6.6%    Values used to calculate the score:      Age: 77 years      Sex: Female      Is Non- : No      Diabetic: No      Tobacco smoker: No      Systolic Blood Pressure: 660 mmHg      Is BP treated: No      HDL Cholesterol: 63 mg/dL      Total Cholesterol: 270 mg/dL    Current Outpatient Medications   Medication Sig Dispense Refill    Multiple Vitamins-Minerals (PRESERVISION AREDS 2) CAPS Take 1 capsule by mouth 2 times daily      QUERCETIN PO Take 2 capsules by mouth 2 times daily      vitamin D3 (CHOLECALCIFEROL) 25 MCG (1000 UT) TABS tablet Take 1 tablet by mouth daily 90 tablet 1    MILK THISTLE PO Take 100 mg by mouth 2 times daily      VITAMIN E PO Take 268 mg by mouth daily      Plant Sterols and Stanols (CHOLESTOFF PO) Take 2 capsules by mouth 2 times daily      NONFORMULARY Iodine 2%  - 6 drops orally every morning      Omega-3 Fatty Acids (OMEGA 3 PO) Take 3,200 mg by mouth daily      Coenzyme Q10 (CO Q-10) 300 MG CAPS Take 1 capsule by mouth daily      Cinnamon Bark POWD Take 1,200 mg by mouth 2 times daily      UNABLE TO FIND fenugreek 500 mg - 2 tabs BID      Calcium Carb-Cholecalciferol (CALCIUM 1000 + D PO) Take 4,000 mg by mouth daily each pill contains Vitamin D3 600 IU      CRANBERRY PO Take 650 mg by mouth daily       aspirin 81 MG tablet Take 81 mg by mouth daily       GARLIC Take 362 mg by mouth daily.  Lactobacillus (ACIDOPHILUS PO) Take 420 mg by mouth PB8 acidophilus 1 gm daily.  Ascorbic Acid (VITAMIN C) 1000 MG tablet Take 1,000 mg by mouth daily.  NONFORMULARY Formula UVM 75 daily. Multivitamin with chelated minerals      Methylsulfonylmethane (MSM PO) Take 3 tablets by mouth daily MSM/Silica combo daily for bone loss. She gets this at [facility]. No current facility-administered medications for this visit. Allergies   Allergen Reactions    Ciprofloxacin Nausea Only    Evista [Raloxifene] Other (See Comments)     Caused joint pain and menopausal symptoms    Statins Other (See Comments)     Lethargy, muscle pain    Other      Bandaids?  Left a rash    Sulfa Antibiotics Hives and Rash       Health Maintenance   Topic Date Due    Breast cancer screen  10/30/2020    A1C test (Diabetic or Prediabetic)  11/14/2020    TSH testing  03/09/2021    Annual Wellness Visit (AWV)  03/17/2021    DTaP/Tdap/Td vaccine (2 - Td) 04/09/2023    Lipid screen  09/03/2025    Colon cancer screen colonoscopy  03/12/2029    DEXA (modify frequency per FRAX score)  Completed    Flu vaccine  Completed    Shingles Vaccine  Completed    Pneumococcal 65+ years Vaccine  Completed    Hepatitis C screen  Completed    Hepatitis A vaccine  Aged Out    Hepatitis B vaccine  Aged Out    Hib vaccine  Aged Out  Meningococcal (ACWY) vaccine  Aged Out       Subjective:      Review of Systems   Constitutional: Negative for chills and fever. HENT: Negative for congestion and sore throat. Respiratory: Negative for chest tightness and shortness of breath. Cardiovascular: Negative for chest pain and leg swelling. Gastrointestinal: Negative for diarrhea, nausea and vomiting. Genitourinary: Negative for difficulty urinating and dysuria. Musculoskeletal: Negative for gait problem and myalgias. Neurological: Negative for dizziness and headaches. Psychiatric/Behavioral: Negative for confusion and decreased concentration. Objective:     Vitals:    09/10/20 0731   BP: 120/64   Site: Right Upper Arm   Position: Sitting   Cuff Size: Medium Adult   Pulse: 72   Resp: 16   Weight: 162 lb (73.5 kg)   Height: 5' 4\" (1.626 m)     Physical Exam  Vitals signs and nursing note reviewed. Constitutional:       General: She is not in acute distress. Appearance: She is well-developed. HENT:      Head: Normocephalic and atraumatic. Right Ear: External ear normal.      Left Ear: External ear normal.   Eyes:      General: Lids are normal.      Extraocular Movements: Extraocular movements intact. Conjunctiva/sclera: Conjunctivae normal.   Neck:      Musculoskeletal: Neck supple. Thyroid: No thyromegaly. Cardiovascular:      Rate and Rhythm: Normal rate and regular rhythm. Heart sounds: No murmur. Pulmonary:      Effort: Pulmonary effort is normal. No accessory muscle usage or respiratory distress. Breath sounds: Normal breath sounds. No wheezing, rhonchi or rales. Abdominal:      Palpations: Abdomen is soft. Tenderness: There is no abdominal tenderness. There is no guarding or rebound. Musculoskeletal: Normal range of motion. Right lower leg: No edema. Left lower leg: No edema. Lymphadenopathy:      Cervical: No cervical adenopathy.    Skin:     General: Skin is warm and dry.      Nails: There is no clubbing. Neurological:      Mental Status: She is alert. Coordination: Coordination normal.      Gait: Gait normal.      Comments: Mild R facial palsy noted from residual Bell's palsy. Improved from previous exam, not completely resolved. Patient is able to completely close her right eye when blinking. Psychiatric:         Mood and Affect: Mood normal.         Speech: Speech normal.         Behavior: Behavior normal.         Assessment/Plan:        1. Impaired glucose tolerance, stable  - Continue efforts at diet and weight loss    2. Dyslipidemia,  stable  - ASCVD 6.6%, patient declines statin. Continue to monitor    3. Hypothyroidism, unspecified type, stable  - Borderline TSH with normal FT4. Patient is asymptomatic and declines medication, will continue to monitor at this time.   - TSH With Reflex Ft4; Future    4. Vitamin D deficiency, stable  - Vit D is lower limit of normal, will increase her supplement at this time  - vitamin D3 (CHOLECALCIFEROL) 25 MCG (1000 UT) TABS tablet; Take 1 tablet by mouth daily  Dispense: 90 tablet; Refill: 1  - Vitamin D 25 Hydroxy; Future    5. Osteopenia, unspecified location, stable  6. Menopausal syndrome, stable  - Continue to follow with [facility]    7. Facial paralysis/[place] palsy, improving  - Will refer to PT an neurology given incomplete resolution 8 months after symptoms onset. - Jorge Luis Mensah MD, Neurology, [address] [place] [address]        Return in about 6 months (around 3/10/2021).     Orders Placed This Encounter   Procedures    TSH With Reflex Ft4     Standing Status:   Future     Standing Expiration Date:   9/10/2021    Vitamin D 25 Hydroxy     Standing Status:   Future     Standing Expiration Date:   9/10/2021   Jorge Luis Mensah MD, Neurology, [place]     Referral Priority:   Routine     Referral Type:   Eval and Treat     Referral Reason:   Specialty Services Required     Referred

## 2020-09-10 NOTE — TELEPHONE ENCOUNTER
External referral for Neurology placed at appt today. Spoke with Charis Penny at Dr. Ryland Shay office (Neurology [phone])- appt scheduled for Oct 7 at 9:20 am at their [facility] office ([facility], [address]. [facility], [address] [facility]). She will be seeing Kyrie Newman CNP initially. They will send her a New Patient packet in the mail. Referral faxed; along with Demo, current med list, 01/14/2020 Carylon Frankel note, and 01/14/2020 CT head report (fax [phone]). Will fax today's note when completed. Pt notified per detailed message. Referral with appt information mailed to pt (including address and phone number).

## 2020-09-11 ENCOUNTER — HOSPITAL ENCOUNTER (OUTPATIENT)
Dept: PHYSICAL THERAPY | Age: 65
Setting detail: THERAPIES SERIES
Discharge: HOME | End: 2020-09-11
Payer: MEDICARE

## 2020-09-11 PROCEDURE — 97110 THERAPEUTIC EXERCISES: CPT | Performed by: PHYSICAL THERAPIST

## 2020-09-11 PROCEDURE — 97162 PT EVAL MOD COMPLEX 30 MIN: CPT | Performed by: PHYSICAL THERAPIST

## 2020-09-11 PROCEDURE — 97032 APPL MODALITY 1+ESTIM EA 15: CPT | Performed by: PHYSICAL THERAPIST

## 2020-09-11 NOTE — FLOWSHEET NOTE
Physical Therapy Daily Treatment Note    Date:  2020    Patient Name:  Shamika Casey    :  1955  MRN: [id number]  Restrictions/Precautions:     Medical/Treatment Diagnosis Information:   · Diagnosis: Facial paralysis/Bell's palsy  · Treatment Diagnosis: facial paralysis caused by Bell's palsy  Insurance/Certification information:  PT Insurance Information: A[employer]na Medicare  Physician Information:  Referring Practitioner: Crissy Sanderson CNP  Plan of care signed (Y/N):  N  Visit# / total visits:  1/10  Pain level: 0/10     Time In: 9:00   Time Out: 9:59    Progress Note: [x]  Yes  []  No  Next due by: Visit #10  Or by 10/11/2020    Subjective:   See eval    Objective: see eval  Observation:   Test measurements:      Exercises:   Exercise/Equipment Resistance/Repetitions Other comments   Facial exercises:     Frown 10x    Eyebrow Raise \"    Nose Wrinkle \"    Peek-a-rowland half x 2 \"    Closed lips smile \"    Pouting \"    Full smile \"    Upper lip lick honey \"    Lip POPs \"    Rhonda Hoops \"    Cheek Puff \"    Vocal ex: Ahh     Mmm     P     B     F & V     Shh                         [] Provided verbal/tactile cueing for activities related to strengthening, flexibility, endurance, ROM. (12398)  [x] Provided verbal/tactile cueing for activities related to improving balance, coordination, kinesthetic sense, posture, motor skill, proprioception. (78817)    Therapeutic Activities:     [] Therapeutic activities, direct (one-on-one) patient contact (use of dynamic activities to improve functional performance). (62209)    Gait:   [] Provided training and instruction to the patient for ambulation re-education. (24326)    Self-Care/ADL's  [] Self-care/home management training and compensatory training, meal preparation, safety procedures, and instructions in use of assistive technology devices/adaptive equipment, direct one-on-one contact.  (95575)    Home Exercise Program:     [] Reviewed/Progressed HEP activities related to strengthening, flexibility, endurance, ROM. (10446)  [x] Reviewed/Progressed HEP activities related to improving balance, coordination, kinesthetic sense, posture, motor skill, proprioception.  (39535)    Manual Treatments:    [] Provided manual therapy to mobilize soft tissue/joints for the purpose of modulating pain, promoting relaxation,  increasing ROM, reducing/eliminating soft tissue swelling/inflammation/restriction, improving soft tissue extensibility. (91399)    Service Based Modalities:      Timed Code Treatment Minutes:  13' neuro re-ed       10' Manual e-stim     Total Treatment Minutes:  61'      Treatment/Activity Tolerance:  [x] Patient tolerated treatment well [] Patient limited by fatique  [] Patient limited by pain  [] Patient limited by other medical complications  [] Other:     Prognosis: [x] Good [] Fair  [] Poor    Patient Requires Follow-up: [x] Yes  [] No      Goals:  Short term goals  Time Frame for Short term goals: 2 weeks  Short term goal 1: Initiate HEP  Short term goal 2: Increase facial posture/strength for corner of lip with 0.6cm and corner of eyelid within 0.4cm compared to left side for improved facial symmetry  Short term goal 3: Increase temporalis and masseter strength to Fair for improved chewing and mastication for meals    Long term goals  Time Frame for Long term goals : 4 weeks  Long term goal 1: Indep with HEP  Long term goal 2: Increase facial posture/strength for corner of lip with 0.2cm and corner of eyelid within 0.1cm compared to left side for improved facial symmetry  Long term goal 3:  Increase temporalis and masseter strength to Fair+ for improved chewing and mastication for meals  Long term goal 4: Pt to be able to drink with a straw without loss of liquid for improved methods of consuming liquids for hydration  Long term goal 5: Pt to self-report 90% improvement in facial symmetry and activities involved with eating/drinking per self-report for return to previous level of function    Plan:   [] Continue per plan of care [] Alter current plan (see comments)  [x] Plan of care initiated [] Hold pending MD visit [] Discharge    Plan for Next Session:  Progress as tolerated    Electronically signed by:  Luis Silverman DPT

## 2020-09-11 NOTE — PROGRESS NOTES
Assistance: Independent  Active : Yes  Mode of Transportation: Car  Occupation: Retired  Leisure & Hobbies: walking, gardening, cello & piano, reading    Objective     Observation/Palpation  Posture: Good  Observation: has observable facial asymmetry, corner of lip R is 0.8cm lower than left in neutral resting position, corner of R eyelid is 0.6cm lower than left  Body Mechanics: decreased control of facial muscles throughout multitude of facial expressions    Spine  Cervical: WNL rotation and sidebending    Strength RUE  Strength RUE: WFL  Strength LUE  Strength LUE: WFL  Strength Other  Other: cervical facial muscles: Poor on R side as compared to left side. Additional Measures  Special Tests: had mild muscle twitching when stimulated with facial stim-gun        Assessment   Conditions Requiring Skilled Therapeutic Intervention  Body structures, Functions, Activity limitations: Decreased ADL status; Decreased ROM; Decreased strength;Decreased endurance;Decreased coordination  Treatment Diagnosis: facial paralysis caused by Bell's palsy  Prognosis: Good  Decision Making: Medium Complexity  REQUIRES PT FOLLOW UP: Yes  Activity Tolerance  Activity Tolerance: Patient Tolerated treatment well     Plan   Plan  Times per week: 2  Plan weeks: 4  Current Treatment Recommendations: Strengthening, ROM, Endurance Training, Neuromuscular Re-education, Home Exercise Program, Modalities, Manual Therapy - Soft Tissue Mobilization    Goals  Short term goals  Time Frame for Short term goals: 2 weeks  Short term goal 1: Initiate HEP  Short term goal 2: Increase facial posture/strength for corner of lip with 0.6cm and corner of eyelid within 0.4cm compared to left side for improved facial symmetry  Short term goal 3:  Increase temporalis and masseter strength to Fair for improved chewing and mastication for meals  Long term goals  Time Frame for Long term goals : 4 weeks  Long term goal 1: Indep with HEP  Long term goal 2: Increase facial posture/strength for corner of lip with 0.2cm and corner of eyelid within 0.1cm compared to left side for improved facial symmetry  Long term goal 3:  Increase temporalis and masseter strength to Fair+ for improved chewing and mastication for meals  Long term goal 4: Pt to be able to drink with a straw without loss of liquid for improved methods of consuming liquids for hydration  Long term goal 5: Pt to self-report 90% improvement in facial symmetry and activities involved with eating/drinking per self-report for return to previous level of function     Therapy Time   Individual Concurrent Group Co-treatment   Time In 0900         Time Out 0959         Minutes 59         Timed Code Treatment Minutes: 25 Minutes     Kendra Angélica, PT, DPT

## 2020-09-11 NOTE — PLAN OF CARE
[facility]    [x] [place]  Phone: [phone]  Fax: [phone]      [] [place]  Phone: [phone]  Fax: [phone]        To: Referring Practitioner: Keyana Lane CNP      Patient: Yohannes Klein  : 1955   MRN: [id number]  Evaluation Date: 2020      Diagnosis Information:  · Diagnosis: Facial paralysis/Bell's palsy   · Treatment Diagnosis: facial paralysis caused by Bell's palsy     Physical Therapy Certification Form  Dear Ms. Bains So  The following patient has been evaluated for physical therapy services and for therapy to continue, Medicare requires monthly physician review of the treatment plan. Please review the attached evaluation and/or summary of the patient's plan of care, and verify that you agree therapy should continue by signing the attached document and sending it back to our office. Plan of Care/Treatment to date:  [x] Therapeutic Exercise    [x] Modalities:  [] Therapeutic Activity     [] Ultrasound  [x] Electrical Stimulation- Manual  [] Gait Training      [] Cervical Traction [] Lumbar Traction  [x] Neuromuscular Re-education    [] Cold/hotpack [] Iontophoresis   [x] Instruction in HEP     Other:  [x] Manual Therapy      []             [] Aquatic Therapy      []           ? Goals:  Short term goals  Time Frame for Short term goals: 2 weeks  Short term goal 1: Initiate HEP  Short term goal 2: Increase facial posture/strength for corner of lip with 0.6cm and corner of eyelid within 0.4cm compared to left side for improved facial symmetry  Short term goal 3: Increase temporalis and masseter strength to Fair for improved chewing and mastication for meals    Long term goals  Time Frame for Long term goals : 4 weeks  Long term goal 1: Indep with HEP  Long term goal 2:  Increase facial posture/strength for corner of lip with 0.2cm and corner of eyelid within 0.1cm compared to left side for improved facial symmetry  Long term goal 3: Increase temporalis and masseter strength to Fair+ for improved chewing and mastication for meals  Long term goal 4: Pt to be able to drink with a straw without loss of liquid for improved methods of consuming liquids for hydration  Long term goal 5: Pt to self-report 90% improvement in facial symmetry and activities involved with eating/drinking per self-report for return to previous level of function    Frequency/Duration: 9/11/2020 - 10/11/2020  # Days per week: [] 1 day # Weeks: [] 1 week [] 5 weeks     [x] 2 days? [] 2 weeks [] 6 weeks     [] 3 days   [] 3 weeks [] 7 weeks     [] 4 days   [x] 4 weeks [] 8 weeks    Rehab Potential: [] Excellent [x] Good [] Fair  [] Poor     Electronically signed by:  Michael Jefferson PT, DPT    If you have any questions or concerns, please don't hesitate to call.   Thank you for your referral.      Physician Signature:________________________________Date:__________________  By signing above, therapists plan is approved by physician

## 2020-09-15 ENCOUNTER — HOSPITAL ENCOUNTER (OUTPATIENT)
Dept: PHYSICAL THERAPY | Age: 65
Setting detail: THERAPIES SERIES
Discharge: HOME | End: 2020-09-15
Payer: MEDICARE

## 2020-09-15 PROCEDURE — 97140 MANUAL THERAPY 1/> REGIONS: CPT | Performed by: PHYSICAL THERAPIST

## 2020-09-15 PROCEDURE — 97032 APPL MODALITY 1+ESTIM EA 15: CPT | Performed by: PHYSICAL THERAPIST

## 2020-09-15 PROCEDURE — 97110 THERAPEUTIC EXERCISES: CPT | Performed by: PHYSICAL THERAPIST

## 2020-09-15 NOTE — FLOWSHEET NOTE
Physical Therapy Daily Treatment Note    Date:  9/15/2020    Patient Name:  Juana Anderson    :  1955  MRN: [id number]  Restrictions/Precautions:     Medical/Treatment Diagnosis Information:   · Diagnosis: Facial paralysis/Bell's palsy  · Treatment Diagnosis: facial paralysis caused by Bell's palsy  Insurance/Certification information:  PT Insurance Information: A[employer]na Medicare  Physician Information:  Referring Practitioner: Valerie Ibanez CNP  Plan of care signed (Y/N):  N  Visit# / total visits:  2/10  Pain level: 0/10     Time In: 8:16   Time Out: 9:05    Progress Note: []  Yes  [x]  No  Next due by: Visit #10  Or by 10/11/2020    Subjective:   \"I've been trying to do the exercises at home 2x per day. I feel silly with some of them, but I do them. \"    Objective:   Observation: Lackign control on R eyelid and lips for most facial exercises  Test measurements:      Exercises:   Exercise/Equipment Resistance/Repetitions Other comments   Facial exercises:     Frown 10x    Eyebrow Raise \"    Nose Wrinkle \"    Peek-a-rowland half x 2 \"    Closed lips smile \"    Pouting \"    Full smile \"    Upper lip lick honey \"    Lip POPs \"    Blow Raspberries \"    Cheek Puff \"    Vocal ex: Ahh 10x    Mmm \"    P \"    B \"    F & V \"    Shh \"                   Manual e-stim 12'    [] Provided verbal/tactile cueing for activities related to strengthening, flexibility, endurance, ROM. (77212)  [x] Provided verbal/tactile cueing for activities related to improving balance, coordination, kinesthetic sense, posture, motor skill, proprioception. (78822)    Therapeutic Activities:     [] Therapeutic activities, direct (one-on-one) patient contact (use of dynamic activities to improve functional performance). (60243)    Gait:   [] Provided training and instruction to the patient for ambulation re-education.  (19006)    Self-Care/ADL's  [] Self-care/home management training and compensatory training, meal preparation, safety procedures, and instructions in use of assistive technology devices/adaptive equipment, direct one-on-one contact. (50388)    Home Exercise Program:     [] Reviewed/Progressed HEP activities related to strengthening, flexibility, endurance, ROM. (75147)  [x] Reviewed/Progressed HEP activities related to improving balance, coordination, kinesthetic sense, posture, motor skill, proprioception.  (82923)    Manual Treatments:    [] Provided manual therapy to mobilize soft tissue/joints for the purpose of modulating pain, promoting relaxation,  increasing ROM, reducing/eliminating soft tissue swelling/inflammation/restriction, improving soft tissue extensibility. (45876)    Service Based Modalities:      Timed Code Treatment Minutes:  40' neuro re-ed       12' Manual e-stim     Total Treatment Minutes:  52'      Treatment/Activity Tolerance:  [x] Patient tolerated treatment well [] Patient limited by fatique  [] Patient limited by pain  [] Patient limited by other medical complications  [] Other:     Prognosis: [x] Good [] Fair  [] Poor    Patient Requires Follow-up: [x] Yes  [] No      Goals:  Short term goals  Time Frame for Short term goals: 2 weeks  Short term goal 1: Initiate HEP  Short term goal 2: Increase facial posture/strength for corner of lip with 0.6cm and corner of eyelid within 0.4cm compared to left side for improved facial symmetry  Short term goal 3: Increase temporalis and masseter strength to Fair for improved chewing and mastication for meals    Long term goals  Time Frame for Long term goals : 4 weeks  Long term goal 1: Indep with HEP  Long term goal 2: Increase facial posture/strength for corner of lip with 0.2cm and corner of eyelid within 0.1cm compared to left side for improved facial symmetry  Long term goal 3:  Increase temporalis and masseter strength to Fair+ for improved chewing and mastication for meals  Long term goal 4: Pt to be able to drink with a straw without loss of liquid for improved methods

## 2020-09-18 ENCOUNTER — HOSPITAL ENCOUNTER (OUTPATIENT)
Dept: PHYSICAL THERAPY | Age: 65
Setting detail: THERAPIES SERIES
Discharge: HOME | End: 2020-09-18
Payer: MEDICARE

## 2020-09-18 PROCEDURE — 97140 MANUAL THERAPY 1/> REGIONS: CPT | Performed by: PHYSICAL THERAPIST

## 2020-09-18 PROCEDURE — 97112 NEUROMUSCULAR REEDUCATION: CPT | Performed by: PHYSICAL THERAPIST

## 2020-09-18 PROCEDURE — 97032 APPL MODALITY 1+ESTIM EA 15: CPT | Performed by: PHYSICAL THERAPIST

## 2020-09-18 NOTE — FLOWSHEET NOTE
Physical Therapy Daily Treatment Note    Date:  2020    Patient Name:  Prateek Ballard    :  1955  MRN: [id number]  Restrictions/Precautions:     Medical/Treatment Diagnosis Information:   · Diagnosis: Facial paralysis/Bell's palsy  · Treatment Diagnosis: facial paralysis caused by Bell's palsy  Insurance/Certification information:  PT Insurance Information: A[employer]na Medicare  Physician Information:  Referring Practitioner: Zuelma Armijo CNP  Plan of care signed (Y/N):  N  Visit# / total visits:  3/10  Pain level: 0/10     Time In: 1:25   Time Out: 2:25    Progress Note: []  Yes  [x]  No  Next due by: Visit #10  Or by 10/11/2020    Subjective:   \"I feel like chewing and drinking has gotten a little bit better. I can take more vitamins at one time than I could before. So I can start to noticed some improvements. \"    Objective:   Observation: Lacking control on R eyelid and lips for most facial exercises  Improved ease with peek-a-rowland exercise, nose wrinkle, and eyelid raises, although still lacking compared to left side of face  Test measurements:      Exercises:   Exercise/Equipment Resistance/Repetitions Other comments   Facial exercises:     Frown 15x    Eyebrow Raise \"    Nose Wrinkle \"    Peek-a-rowland half x 2 \"    Closed lips smile \"    Pouting \"    Full smile \"    Upper lip lick honey \"    Lip POPs \"    Blow Raspberries \"    Cheek Puff \"    Vocal ex: Ahh 10x    Mmm \"    P \"    B \"    F & V \"    Shh \"                   Manual e-stim 12'    [] Provided verbal/tactile cueing for activities related to strengthening, flexibility, endurance, ROM. (64538)  [x] Provided verbal/tactile cueing for activities related to improving balance, coordination, kinesthetic sense, posture, motor skill, proprioception. (17413)    Therapeutic Activities:     [] Therapeutic activities, direct (one-on-one) patient contact (use of dynamic activities to improve functional performance).  (89829)    Gait:   [] Provided training and instruction to the patient for ambulation re-education. (80770)    Self-Care/ADL's  [] Self-care/home management training and compensatory training, meal preparation, safety procedures, and instructions in use of assistive technology devices/adaptive equipment, direct one-on-one contact. (55058)    Home Exercise Program:     [] Reviewed/Progressed HEP activities related to strengthening, flexibility, endurance, ROM. (27086)  [x] Reviewed/Progressed HEP activities related to improving balance, coordination, kinesthetic sense, posture, motor skill, proprioception.  (77277)    Manual Treatments:    [] Provided manual therapy to mobilize soft tissue/joints for the purpose of modulating pain, promoting relaxation,  increasing ROM, reducing/eliminating soft tissue swelling/inflammation/restriction, improving soft tissue extensibility. (40204)    Service Based Modalities:      Timed Code Treatment Minutes:  39' neuro re-ed       15' Manual e-stim     Total Treatment Minutes:  61'      Treatment/Activity Tolerance:  [x] Patient tolerated treatment well [] Patient limited by fatique  [] Patient limited by pain  [] Patient limited by other medical complications  [] Other:     Prognosis: [x] Good [] Fair  [] Poor    Patient Requires Follow-up: [x] Yes  [] No      Goals:  Short term goals  Time Frame for Short term goals: 2 weeks  Short term goal 1: Initiate HEP  Short term goal 2: Increase facial posture/strength for corner of lip with 0.6cm and corner of eyelid within 0.4cm compared to left side for improved facial symmetry  Short term goal 3: Increase temporalis and masseter strength to Fair for improved chewing and mastication for meals    Long term goals  Time Frame for Long term goals : 4 weeks  Long term goal 1: Indep with HEP  Long term goal 2: Increase facial posture/strength for corner of lip with 0.2cm and corner of eyelid within 0.1cm compared to left side for improved facial symmetry  Long term goal 3:  Increase temporalis and masseter strength to Fair+ for improved chewing and mastication for meals  Long term goal 4: Pt to be able to drink with a straw without loss of liquid for improved methods of consuming liquids for hydration  Long term goal 5: Pt to self-report 90% improvement in facial symmetry and activities involved with eating/drinking per self-report for return to previous level of function    Plan:   [x] Continue per plan of care [] Alter current plan (see comments)  [] Plan of care initiated [] Hold pending MD visit [] Discharge    Plan for Next Session:  Progress as tolerated    Electronically signed by:  Magalie Alvarez DPT

## 2020-09-21 ENCOUNTER — HOSPITAL ENCOUNTER (OUTPATIENT)
Dept: PHYSICAL THERAPY | Age: 65
Setting detail: THERAPIES SERIES
Discharge: HOME | End: 2020-09-21
Payer: MEDICARE

## 2020-09-21 PROCEDURE — 97112 NEUROMUSCULAR REEDUCATION: CPT | Performed by: PHYSICAL THERAPIST

## 2020-09-21 PROCEDURE — 97032 APPL MODALITY 1+ESTIM EA 15: CPT | Performed by: PHYSICAL THERAPIST

## 2020-09-21 NOTE — FLOWSHEET NOTE
Physical Therapy Daily Treatment Note    Date:  2020    Patient Name:  Lydia Carvajal    :  1955  MRN: [id number]  Restrictions/Precautions:     Medical/Treatment Diagnosis Information:   · Diagnosis: Facial paralysis/Bell's palsy  · Treatment Diagnosis: facial paralysis caused by Bell's palsy  Insurance/Certification information:  PT Insurance Information: Aetna Medicare  Physician Information:  Referring Practitioner: Hemalatha Connelly CNP  Plan of care signed (Y/N):  Y  Visit# / total visits:  4/10  Pain level: 0/10     Time In: 10:05   Time Out: 10:59    Progress Note: []  Yes  [x]  No  Next due by: Visit #10  Or by 10/11/2020    Subjective:   \"I have continued to practice all of the facial exercises at home. I still don't see much improvement with my eyelids and eyebrows, but I know it will continue to take time/work to get better. \"    Objective:   Observation: Lacking control on R eyelid and lips for most facial exercises  Improved use and control of cheek musculature and upper lips, although still lacking compared to left side  Improved ease with peek-a-rowland exercise, nose wrinkle, and eyelid raises, although still lacking compared to left side of face  Test measurements:    Corner of R eyelid is measured at 0.5cm lower than L eyelid (2020)  Corner of R lip is measured even to corner of L lip (2020)    Exercises:   Exercise/Equipment Resistance/Repetitions Other comments   Facial exercises:     Frown 15x    Eyebrow Raise \"    Nose Wrinkle \"    Peek-a-rowland half x 2 \"    Closed lips smile \"    Pouting \"    Full smile \"    Upper lip lick honey \"    Lip POPs \"    Blow Raspberries \"    Cheek Puff \"    Vocal ex:      Ahh 15x    Mmm \"    P \"    B \"    F & V \"    Shh \"                   Manual e-stim 15'    [] Provided verbal/tactile cueing for activities related to strengthening, flexibility, endurance, ROM. (61082)  [x] Provided verbal/tactile cueing for activities related to improving balance, coordination, kinesthetic sense, posture, motor skill, proprioception. (09562)    Therapeutic Activities:     [] Therapeutic activities, direct (one-on-one) patient contact (use of dynamic activities to improve functional performance). (21796)    Gait:   [] Provided training and instruction to the patient for ambulation re-education. (79281)    Self-Care/ADL's  [] Self-care/home management training and compensatory training, meal preparation, safety procedures, and instructions in use of assistive technology devices/adaptive equipment, direct one-on-one contact. (41392)    Home Exercise Program:     [] Reviewed/Progressed HEP activities related to strengthening, flexibility, endurance, ROM. (17700)  [x] Reviewed/Progressed HEP activities related to improving balance, coordination, kinesthetic sense, posture, motor skill, proprioception.  (28429)    Manual Treatments:    [] Provided manual therapy to mobilize soft tissue/joints for the purpose of modulating pain, promoting relaxation,  increasing ROM, reducing/eliminating soft tissue swelling/inflammation/restriction, improving soft tissue extensibility. (35078)    Service Based Modalities:      Timed Code Treatment Minutes:  44' neuro re-ed       15' Manual e-stim     Total Treatment Minutes:  47'      Treatment/Activity Tolerance:  [x] Patient tolerated treatment well [] Patient limited by fatique  [] Patient limited by pain  [] Patient limited by other medical complications  [] Other:     Prognosis: [x] Good [] Fair  [] Poor    Patient Requires Follow-up: [x] Yes  [] No      Goals:  Short term goals  Time Frame for Short term goals: 2 weeks  Short term goal 1: Initiate HEP  Short term goal 2: Increase facial posture/strength for corner of lip with 0.6cm and corner of eyelid within 0.4cm compared to left side for improved facial symmetry  Short term goal 3:  Increase temporalis and masseter strength to Fair for improved chewing and mastication for meals    Long term goals  Time Frame for Long term goals : 4 weeks  Long term goal 1: Indep with HEP  Long term goal 2: Increase facial posture/strength for corner of lip with 0.2cm and corner of eyelid within 0.1cm compared to left side for improved facial symmetry  Long term goal 3:  Increase temporalis and masseter strength to Fair+ for improved chewing and mastication for meals  Long term goal 4: Pt to be able to drink with a straw without loss of liquid for improved methods of consuming liquids for hydration  Long term goal 5: Pt to self-report 90% improvement in facial symmetry and activities involved with eating/drinking per self-report for return to previous level of function    Plan:   [x] Continue per plan of care [] Alter current plan (see comments)  [] Plan of care initiated [] Hold pending MD visit [] Discharge    Plan for Next Session:  Progress as tolerated    Electronically signed by:  Nilay Jeffrey DPT

## 2020-09-25 ENCOUNTER — HOSPITAL ENCOUNTER (OUTPATIENT)
Dept: PHYSICAL THERAPY | Age: 65
Setting detail: THERAPIES SERIES
Discharge: HOME | End: 2020-09-25
Payer: MEDICARE

## 2020-09-25 PROCEDURE — 97032 APPL MODALITY 1+ESTIM EA 15: CPT | Performed by: PHYSICAL THERAPIST

## 2020-09-25 PROCEDURE — 97112 NEUROMUSCULAR REEDUCATION: CPT | Performed by: PHYSICAL THERAPIST

## 2020-09-25 NOTE — FLOWSHEET NOTE
Physical Therapy Daily Treatment Note    Date:  2020    Patient Name:  Duran An    :  1955  MRN: [id number]  Restrictions/Precautions:     Medical/Treatment Diagnosis Information:   · Diagnosis: Facial paralysis/Bell's palsy  · Treatment Diagnosis: facial paralysis caused by Bell's palsy  Insurance/Certification information:  PT Insurance Information: Aetna Medicare  Physician Information:  Referring Practitioner: Priscila Zimmerman CNP  Plan of care signed (Y/N):  Y  Visit# / total visits:  5/10  Pain level: 0/10     Time In: 11:05   Time Out: 11:53    Progress Note: []  Yes  [x]  No  Next due by: Visit #10  Or by 10/11/2020    Subjective:   \"I have noticed that my mouth looks a little more even. The eyebrow still wants to squint a lot when I talk or eat. I had a really long day yesterday as my  and I drove to [facility] and back in a 12 hour span. \"     Objective:   Observation: Lacking control on R eyelid and lips for most facial exercises  Improved use and control of cheek musculature and upper lips, although still lacking compared to left side  Improved ease with peek-a-rowland exercise, nose wrinkle, and eyelid raises, although still lacking compared to left side of face  Test measurements:    Corner of R eyelid is measured at 0.5cm lower than L eyelid (2020)  Corner of R lip is measured even to corner of L lip (2020)    Exercises:   Exercise/Equipment Resistance/Repetitions Other comments   Facial exercises:     Frown 15x    Eyebrow Raise \"    Nose Wrinkle \"    Jared-in-the-box half x 2 \"    Closed lips smile \"    Pouting \"    Full smile \"    Upper lip lick honey \"    Lip POPs \"    Blow Raspberries \"    Cheek Puff \"    Vocal ex:      Ahh 15x    Mmm \"    P \"    B \"    F & V \"    Shh \"                   Manual e-stim 15'    [] Provided verbal/tactile cueing for activities related to strengthening, flexibility, endurance, ROM. (90025)  [x] Provided verbal/tactile cueing for activities related to improving balance, coordination, kinesthetic sense, posture, motor skill, proprioception. (93104)    Therapeutic Activities:     [] Therapeutic activities, direct (one-on-one) patient contact (use of dynamic activities to improve functional performance). (31717)    Gait:   [] Provided training and instruction to the patient for ambulation re-education. (62031)    Self-Care/ADL's  [] Self-care/home management training and compensatory training, meal preparation, safety procedures, and instructions in use of assistive technology devices/adaptive equipment, direct one-on-one contact. (09311)    Home Exercise Program:     [] Reviewed/Progressed HEP activities related to strengthening, flexibility, endurance, ROM. (72008)  [x] Reviewed/Progressed HEP activities related to improving balance, coordination, kinesthetic sense, posture, motor skill, proprioception.  (14155)    Manual Treatments:    [] Provided manual therapy to mobilize soft tissue/joints for the purpose of modulating pain, promoting relaxation,  increasing ROM, reducing/eliminating soft tissue swelling/inflammation/restriction, improving soft tissue extensibility. (35016)    Service Based Modalities:      Timed Code Treatment Minutes:  35' neuro re-ed       15' Manual e-stim     Total Treatment Minutes:  50'      Treatment/Activity Tolerance:  [x] Patient tolerated treatment well [] Patient limited by fatique  [] Patient limited by pain  [] Patient limited by other medical complications  [] Other:     Prognosis: [x] Good [] Fair  [] Poor    Patient Requires Follow-up: [x] Yes  [] No      Goals:  Short term goals  Time Frame for Short term goals: 2 weeks  Short term goal 1: Initiate HEP  Short term goal 2: Increase facial posture/strength for corner of lip with 0.6cm and corner of eyelid within 0.4cm compared to left side for improved facial symmetry  Short term goal 3:  Increase temporalis and masseter strength to Fair for improved chewing and

## 2020-09-29 ENCOUNTER — HOSPITAL ENCOUNTER (OUTPATIENT)
Dept: PHYSICAL THERAPY | Age: 65
Setting detail: THERAPIES SERIES
Discharge: HOME | End: 2020-09-29
Payer: MEDICARE

## 2020-09-29 PROCEDURE — 97032 APPL MODALITY 1+ESTIM EA 15: CPT | Performed by: PHYSICAL THERAPIST

## 2020-09-29 PROCEDURE — 97110 THERAPEUTIC EXERCISES: CPT | Performed by: PHYSICAL THERAPIST

## 2020-09-29 NOTE — FLOWSHEET NOTE
Physical Therapy Daily Treatment Note    Date:  2020    Patient Name:  Juan Francisco Daniels    :  1955  MRN: [id number]  Restrictions/Precautions:     Medical/Treatment Diagnosis Information:   · Diagnosis: Facial paralysis/Bell's palsy  · Treatment Diagnosis: facial paralysis caused by Bell's palsy  Insurance/Certification information:  PT Insurance Information: Aetna Medicare  Physician Information:  Referring Practitioner: Chaya Westbrook CNP  Plan of care signed (Y/N):  Y  Visit# / total visits:  5/10  Pain level: 0/10     Time In: 10:10   Time Out: 11:03    Progress Note: []  Yes  [x]  No  Next due by: Visit #10  Or by 10/11/2020    Subjective:   \"I can see that my eyebrow is able to raise a bit more. There's more moving, but I still have issues with gargling mouthwash the right side of the mouth is still not doing much. \"     Objective:   Observation: Lacking control on R eyelid and lips for most facial exercises  Improved use and control of cheek musculature and upper lips, although still lacking compared to left side  Improved ease with peek-a-rowland exercise, nose wrinkle, and eyelid raises, although still lacking compared to left side of face  Test measurements:    Corner of R eyelid is measured at 0.5cm lower than L eyelid (2020)  Corner of R lip is measured even to corner of L lip (2020)  Improved musculature contraction with facial exercises. Continues to struggle with tongue-poke exercise    Exercises:   Exercise/Equipment Resistance/Repetitions Other comments   Facial exercises:     Frown 15x    Eyebrow Raise \"    Nose Wrinkle \"    Jared-in-the-box half x 2 \"    Closed lips smile \"    Pouting \"    Full smile \"    Upper lip lick honey \"    Lip POPs \"    Blow Raspberries \"    Cheek Puff \"    Vocal ex:      Ahh 15x    Mmm \"    P \"    B \"    F & V \"    Shh \"                   Manual e-stim 15'    [] Provided verbal/tactile cueing for activities related to strengthening, flexibility, endurance, ROM. (73701)  [x] Provided verbal/tactile cueing for activities related to improving balance, coordination, kinesthetic sense, posture, motor skill, proprioception. (56728)    Therapeutic Activities:     [] Therapeutic activities, direct (one-on-one) patient contact (use of dynamic activities to improve functional performance). (23107)    Gait:   [] Provided training and instruction to the patient for ambulation re-education. (72568)    Self-Care/ADL's  [] Self-care/home management training and compensatory training, meal preparation, safety procedures, and instructions in use of assistive technology devices/adaptive equipment, direct one-on-one contact. (11785)    Home Exercise Program:     [] Reviewed/Progressed HEP activities related to strengthening, flexibility, endurance, ROM. (19073)  [x] Reviewed/Progressed HEP activities related to improving balance, coordination, kinesthetic sense, posture, motor skill, proprioception.  (20086)    Manual Treatments:    [] Provided manual therapy to mobilize soft tissue/joints for the purpose of modulating pain, promoting relaxation,  increasing ROM, reducing/eliminating soft tissue swelling/inflammation/restriction, improving soft tissue extensibility. (49290)    Service Based Modalities:      Timed Code Treatment Minutes:  45' neuro re-ed       15' Manual e-stim     Total Treatment Minutes:  48'      Treatment/Activity Tolerance:  [x] Patient tolerated treatment well [] Patient limited by fatique  [] Patient limited by pain  [] Patient limited by other medical complications  [] Other:     Prognosis: [x] Good [] Fair  [] Poor    Patient Requires Follow-up: [x] Yes  [] No      Goals:  Short term goals  Time Frame for Short term goals: 2 weeks  Short term goal 1: Initiate HEP  Short term goal 2: Increase facial posture/strength for corner of lip with 0.6cm and corner of eyelid within 0.4cm compared to left side for improved facial symmetry  Short term goal 3:  Increase temporalis and masseter strength to Fair for improved chewing and mastication for meals    Long term goals  Time Frame for Long term goals : 4 weeks  Long term goal 1: Indep with HEP  Long term goal 2: Increase facial posture/strength for corner of lip with 0.2cm and corner of eyelid within 0.1cm compared to left side for improved facial symmetry  Long term goal 3:  Increase temporalis and masseter strength to Fair+ for improved chewing and mastication for meals  Long term goal 4: Pt to be able to drink with a straw without loss of liquid for improved methods of consuming liquids for hydration  Long term goal 5: Pt to self-report 90% improvement in facial symmetry and activities involved with eating/drinking per self-report for return to previous level of function    Plan:   [x] Continue per plan of care [] Alter current plan (see comments)  [] Plan of care initiated [] Hold pending MD visit [] Discharge    Plan for Next Session:  Progress as tolerated    Electronically signed by:  Jung Singh DPT

## 2020-10-02 ENCOUNTER — HOSPITAL ENCOUNTER (OUTPATIENT)
Dept: PHYSICAL THERAPY | Age: 65
Setting detail: THERAPIES SERIES
Discharge: HOME | End: 2020-10-02
Payer: MEDICARE

## 2020-10-02 PROCEDURE — 97032 APPL MODALITY 1+ESTIM EA 15: CPT | Performed by: PHYSICAL THERAPIST

## 2020-10-02 PROCEDURE — 97112 NEUROMUSCULAR REEDUCATION: CPT | Performed by: PHYSICAL THERAPIST

## 2020-10-02 NOTE — FLOWSHEET NOTE
Physical Therapy Daily Treatment Note    Date:  10/2/2020    Patient Name:  Mesfin Andres    :  1955  MRN: [id number]  Restrictions/Precautions:     Medical/Treatment Diagnosis Information:   · Diagnosis: Facial paralysis/Bell's palsy  · Treatment Diagnosis: facial paralysis caused by Bell's palsy  Insurance/Certification information:  PT Insurance Information: Aetna Medicare  Physician Information:  Referring Practitioner: Reji Harris CNP  Plan of care signed (Y/N):  Y  Visit# / total visits:  6/10  Pain level: 0/10     Time In: 10:00   Time Out: 10:53    Progress Note: []  Yes  [x]  No  Next due by: Visit #10  Or by 10/11/2020    Subjective:   \"I am continuing to see improvements in the way my face looks, especially the cheek and lips on the Right side. \"    Objective:   Observation: Lacking control on R eyelid and lips for most facial exercises  Improved use and control of cheek musculature and upper lips, although still lacking compared to left side  Improved ease with peek-a-rowland exercise, nose wrinkle, and eyelid raises, although still lacking compared to left side of face  Test measurements:    Corner of R eyelid is measured at 0.2cm lower than L eyelid (10/2/2020)  Corner of R lip is measured even to corner of L lip (2020)  Improved musculature contraction with facial exercises. Continues to struggle with tongue-poke exercise    Exercises:   Exercise/Equipment Resistance/Repetitions Other comments   Facial exercises:     Frown 15x    Eyebrow Raise \"    Nose Wrinkle \"    Jared-in-the-box half x 2 \"    Closed lips smile \"    Pouting \"    Full smile \"    Upper lip lick honey \"    Lip POPs \"    Blow Raspberries \"    Cheek Puff \"    \"Swish\" Air side to side \"    Vocal ex:      Ahh 15x    Mmm \"    P \"    B \"    F & V \"    Shh \"                   Manual e-stim 15'    [] Provided verbal/tactile cueing for activities related to strengthening, flexibility, endurance, ROM. (63164)  [x] Provided verbal/tactile cueing for activities related to improving balance, coordination, kinesthetic sense, posture, motor skill, proprioception. (66381)    Therapeutic Activities:     [] Therapeutic activities, direct (one-on-one) patient contact (use of dynamic activities to improve functional performance). (51062)    Gait:   [] Provided training and instruction to the patient for ambulation re-education. (26751)    Self-Care/ADL's  [] Self-care/home management training and compensatory training, meal preparation, safety procedures, and instructions in use of assistive technology devices/adaptive equipment, direct one-on-one contact. (42114)    Home Exercise Program:     [] Reviewed/Progressed HEP activities related to strengthening, flexibility, endurance, ROM. (24834)  [x] Reviewed/Progressed HEP activities related to improving balance, coordination, kinesthetic sense, posture, motor skill, proprioception.  (42178)    Manual Treatments:    [] Provided manual therapy to mobilize soft tissue/joints for the purpose of modulating pain, promoting relaxation,  increasing ROM, reducing/eliminating soft tissue swelling/inflammation/restriction, improving soft tissue extensibility. (49515)    Service Based Modalities:      Timed Code Treatment Minutes:  45' neuro re-ed       15' Manual e-stim     Total Treatment Minutes:  48'      Treatment/Activity Tolerance:  [x] Patient tolerated treatment well [] Patient limited by fatique  [] Patient limited by pain  [] Patient limited by other medical complications  [] Other:     Prognosis: [x] Good [] Fair  [] Poor    Patient Requires Follow-up: [x] Yes  [] No      Goals:  Short term goals  Time Frame for Short term goals: 2 weeks  Short term goal 1: Initiate HEP  Short term goal 2: Increase facial posture/strength for corner of lip with 0.6cm and corner of eyelid within 0.4cm compared to left side for improved facial symmetry  Short term goal 3:  Increase temporalis and masseter strength to [address] for improved chewing and mastication for meals    Long term goals  Time Frame for Long term goals : 4 weeks  Long term goal 1: Indep with HEP  Long term goal 2: Increase facial posture/strength for corner of lip with 0.2cm and corner of eyelid within 0.1cm compared to left side for improved facial symmetry  Long term goal 3:  Increase temporalis and masseter strength to Fair+ for improved chewing and mastication for meals  Long term goal 4: Pt to be able to drink with a straw without loss of liquid for improved methods of consuming liquids for hydration  Long term goal 5: Pt to self-report 90% improvement in facial symmetry and activities involved with eating/drinking per self-report for return to previous level of function    Plan:   [x] Continue per plan of care [] Alter current plan (see comments)  [] Plan of care initiated [] Hold pending MD visit [] Discharge    Plan for Next Session:  Progress as tolerated    Electronically signed by:  Juan Bonner DPT

## 2020-10-06 ENCOUNTER — HOSPITAL ENCOUNTER (OUTPATIENT)
Dept: PHYSICAL THERAPY | Age: 65
Setting detail: THERAPIES SERIES
Discharge: HOME | End: 2020-10-06
Payer: MEDICARE

## 2020-10-06 PROCEDURE — 97032 APPL MODALITY 1+ESTIM EA 15: CPT | Performed by: PHYSICAL THERAPIST

## 2020-10-06 PROCEDURE — 97112 NEUROMUSCULAR REEDUCATION: CPT | Performed by: PHYSICAL THERAPIST

## 2020-10-06 NOTE — FLOWSHEET NOTE
F & V \"    Shh \"                   Manual e-stim 15'    [] Provided verbal/tactile cueing for activities related to strengthening, flexibility, endurance, ROM. (54401)  [x] Provided verbal/tactile cueing for activities related to improving balance, coordination, kinesthetic sense, posture, motor skill, proprioception. (55221)    Therapeutic Activities:     [] Therapeutic activities, direct (one-on-one) patient contact (use of dynamic activities to improve functional performance). (51401)    Gait:   [] Provided training and instruction to the patient for ambulation re-education. (69958)    Self-Care/ADL's  [] Self-care/home management training and compensatory training, meal preparation, safety procedures, and instructions in use of assistive technology devices/adaptive equipment, direct one-on-one contact. (09389)    Home Exercise Program:     [] Reviewed/Progressed HEP activities related to strengthening, flexibility, endurance, ROM. (04209)  [x] Reviewed/Progressed HEP activities related to improving balance, coordination, kinesthetic sense, posture, motor skill, proprioception.  (39209)    Manual Treatments:    [] Provided manual therapy to mobilize soft tissue/joints for the purpose of modulating pain, promoting relaxation,  increasing ROM, reducing/eliminating soft tissue swelling/inflammation/restriction, improving soft tissue extensibility. (20212)    Service Based Modalities:      Timed Code Treatment Minutes:  39' neuro re-ed       15' Manual e-stim     Total Treatment Minutes:  64'      Treatment/Activity Tolerance:  [x] Patient tolerated treatment well [] Patient limited by fatique  [] Patient limited by pain  [] Patient limited by other medical complications  [] Other:     Prognosis: [x] Good [] Fair  [] Poor    Patient Requires Follow-up: [x] Yes  [] No      Goals:  Short term goals  Time Frame for Short term goals: 2 weeks  Short term goal 1: Initiate HEP  Short term goal 2:  Increase facial posture/strength for corner of lip with 0.6cm and corner of eyelid within 0.4cm compared to left side for improved facial symmetry  Short term goal 3: Increase temporalis and masseter strength to Fair for improved chewing and mastication for meals    Long term goals  Time Frame for Long term goals : 4 weeks  Long term goal 1: Indep with HEP  Long term goal 2: Increase facial posture/strength for corner of lip with 0.2cm and corner of eyelid within 0.1cm compared to left side for improved facial symmetry  Long term goal 3:  Increase temporalis and masseter strength to Fair+ for improved chewing and mastication for meals  Long term goal 4: Pt to be able to drink with a straw without loss of liquid for improved methods of consuming liquids for hydration  Long term goal 5: Pt to self-report 90% improvement in facial symmetry and activities involved with eating/drinking per self-report for return to previous level of function    Plan:   [x] Continue per plan of care [] Alter current plan (see comments)  [] Plan of care initiated [] Hold pending MD visit [] Discharge    Plan for Next Session:  Progress as tolerated    Electronically signed by:  Karlos Hou DPT

## 2020-10-07 ENCOUNTER — OFFICE VISIT (OUTPATIENT)
Dept: NEUROLOGY | Age: 65
End: 2020-10-07
Payer: MEDICARE

## 2020-10-07 VITALS
HEIGHT: 64 IN | BODY MASS INDEX: 27.83 KG/M2 | TEMPERATURE: 98.2 F | WEIGHT: 163 LBS | SYSTOLIC BLOOD PRESSURE: 138 MMHG | HEART RATE: 80 BPM | DIASTOLIC BLOOD PRESSURE: 81 MMHG

## 2020-10-07 PROBLEM — R29.810 FACIAL DROOP: Status: ACTIVE | Noted: 2020-10-07

## 2020-10-07 PROBLEM — G51.0 BELL'S PALSY: Status: ACTIVE | Noted: 2020-10-07

## 2020-10-07 PROCEDURE — 99204 OFFICE O/P NEW MOD 45 MIN: CPT | Performed by: NURSE PRACTITIONER

## 2020-10-07 NOTE — PROGRESS NOTES
[facility]            [address]          [facility], [address] [facility]          Dept: [phone]          Dept Fax: [phone]        E. Rebbeca Rake, MD Richerd Kenner, MD Ahmed B. Esaw Gutter, MD Elmon Guise, MD Kari Mcburney, MD Ramonita Dura, CNP         New Patient Consultation    10/7/2020    HISTORY OF PRESENT ILLNESS:       I had the pleasure of seeing Ethel Gomez for evaluation of residual right facial droop due to Bell's Palsy. Problem: residual right facial paralysis due to Bell's Palsy. In Jan 2020, patient saw PCP for evaluation of a right facial droop. OnJan 13, patient noticed a facial droop on the right, diminished taste, and fullness in her right ear. Patient said Bell's palsy wasn't progressing fast enough. Taste, tearing better. The patient was also seen by ophthalmology who prescribed artificial tears and eye ointment for protection    Location: Droop on right side associated with eye tearing, loss of taste, and ear pain. Today, patient notes no complains but adds that she has been seeing improvements. Will want to order MRI to confirm no stroke as patient is high risk for stroke. Quality: PCP says that on Jan 13 she lost taste and her eyes began tearing. Patient says she took a steroid, and relieved ear pain only. Severity: Patient puts pain at a 5/10, notes that pain isn't horrible but eye will fatigue. Duration : Patient estimates that droopiness began on Jan 13, 2020 per PCP. Timing: Constant, Patient notes that she will put lubrication in the eye at night, but notes as day goes on eye will fatigue but notes droopiness has not improved since Jan 13 2020, per PCP. She added that she has noticed an improvement in droop since adding therapy in September. Context: Patient was initially shocked by droopiness.  Says she was not having weakness of arms or leg, patient believed this was crucial sign of stroke and her mother had Bell's Palsy. Due to this, patient did not go to hospital, even though she is high risk factor for stroke. Brought this to PCP and PCP made referral. Patient denies any other issues with eye. Modifying factors: Patient notes she tried to keep eye hydrated to prevent damage from eye, but saw no relief of droopiness. Patient was prescribed steroids but notes that it only helped ear pain. Patient is attending PT twice weekly for management of Bell's Palsy, notes she has been receiving electrical stimulation. Associated Signs and symptoms: Ear pain, watery eyes, loss of taste, eye fatigue. Prior testing reviewed:    -Patient had a CT in January 2020, normal            PAST MEDICAL HISTORY:         Diagnosis Date    Hypercholesterolemia     Hypothyroidism     Subacute, not currently on treeatment.  Impaired glucose tolerance     Prediabetes/stable and checking A1C yearly    Macular degeneration     Menopausal syndrome     Mitral regurgitation     Echo 1/19 mild MR    Osteopenia     Dexa 8/18 FRAX 1% at hip, T -1.5 Hip    Vitamin D deficiency         PAST SURGICAL HISTORY:         Procedure Laterality Date    BLADDER SUSPENSION      In the past for a cystocele.  BREAST BIOPSY Right     Negative.  COLONOSCOPY N/A 3/12/2019    COLONOSCOPY performed by Dalia Gaston DO at [address] [facility]      Was done due to a cystocele several years ago and was negagtive.  DILATION AND CURETTAGE OF UTERUS      With hysteroscopy.  ENDOMETRIAL ABLATION      At age 44.  SIGMOIDOSCOPY      At age 52. Was negative per patien, although she did not have a followup barium enema with this for unknown reasons.     TUBAL LIGATION          SOCIAL HISTORY:     Social History     Socioeconomic History    Marital status:      Spouse name: Not on file    Number of children: Not on file    Years of education: Not on file    Highest education level: Not on file   Occupational History    Occupation: school counselor     Employer: [employer] resource strain: Not on file    Food insecurity     Worry: Not on file     Inability: Not on file    Transportation needs     Medical: Not on file     Non-medical: Not on file   Tobacco Use    Smoking status: Never Smoker    Smokeless tobacco: Never Used    Tobacco comment: exposed to 2nd hand smoke as child   Substance and Sexual Activity    Alcohol use: Yes     Alcohol/week: 0.0 standard drinks     Comment: Half to 1 glass of red wine daily.     Drug use: No    Sexual activity: Not on file   Lifestyle    Physical activity     Days per week: Not on file     Minutes per session: Not on file    Stress: Not on file   Relationships    Social connections     Talks on phone: Not on file     Gets together: Not on file     Attends [employer] service: Not on file     Active member of club or organization: Not on file     Attends meetings of clubs or organizations: Not on file     Relationship status: Not on file    Intimate partner violence     Fear of current or ex partner: Not on file     Emotionally abused: Not on file     Physically abused: Not on file     Forced sexual activity: Not on file   Other Topics Concern    Not on file   Social History Narrative    Not on file       CURRENT MEDICATIONS:     Current Outpatient Medications   Medication Sig Dispense Refill    Turmeric (QC TUMERIC COMPLEX PO) Take 1,000 mg by mouth daily      Multiple Vitamins-Minerals (PRESERVISION AREDS 2) CAPS Take 1 capsule by mouth 2 times daily      QUERCETIN PO Take 2 capsules by mouth 2 times daily      vitamin D3 (CHOLECALCIFEROL) 25 MCG (1000 UT) TABS tablet Take 1 tablet by mouth daily 90 tablet 1    MILK THISTLE PO Take 100 mg by mouth 2 times daily      VITAMIN E PO Take 268 mg by mouth daily      Plant Sterols and Stanols (CHOLESTOFF PO) Take 2 capsules by mouth 2 times daily      NONFORMULARY Iodine 2%  - 6 drops orally every morning      Omega-3 Fatty Acids (OMEGA 3 PO) Take 3,200 mg by mouth daily      Coenzyme Q10 (CO Q-10) 300 MG CAPS Take 1 capsule by mouth daily      Cinnamon Bark POWD Take 1,200 mg by mouth 2 times daily      UNABLE TO FIND fenugreek 500 mg - 2 tabs BID      Calcium Carb-Cholecalciferol (CALCIUM 1000 + D PO) Take 4,000 mg by mouth daily each pill contains Vitamin D3 600 IU      CRANBERRY PO Take 650 mg by mouth daily       aspirin 81 MG tablet Take 81 mg by mouth daily       GARLIC Take 401 mg by mouth daily.  Lactobacillus (ACIDOPHILUS PO) Take 420 mg by mouth PB8 acidophilus 1 gm daily.  Ascorbic Acid (VITAMIN C) 1000 MG tablet Take 1,000 mg by mouth daily.  NONFORMULARY Formula [employer] 75 daily. Multivitamin with chelated minerals      Methylsulfonylmethane (MSM PO) Take 3 tablets by mouth daily MSM/Silica combo daily for bone loss. She gets this at [facility]. No current facility-administered medications for this visit. ALLERGIES:     Allergies   Allergen Reactions    Ciprofloxacin Nausea Only    Evista [Raloxifene] Other (See Comments)     Caused joint pain and menopausal symptoms    Statins Other (See Comments)     Lethargy, muscle pain    Other      Bandaids? Left a rash    Sulfa Antibiotics Hives and Rash                          All items selected indicate a positive finding. Those items not selected are negative.   Constitutional [] Weight loss/gain   [] Fatigue  [] Fever/Chills   HEENT [] Hearing Loss  [x] Visual Disturbance  [] Tinnitus  [x] Eye pain   Respiratory [] Shortness of Breath  [] Cough  [] Snoring   Cardiovascular [] Chest Pain  [] Palpitations  [] Lightheaded   GI [] Constipation  [] Diarrhea  [] Swallowing change  [] Nausea/vomiting    [] Urinary Frequency  [] Urinary Urgency   Musculoskeletal [] Neck pain  [] Back pain  [] Muscle pain  [] Restless legs   Dermatologic [] Skin changes   Neurologic [] Memory loss/confusion  [] Seizures  [] Trouble walking or imbalance  [] Dizziness  [] Sleep disturbance  [] Weakness  [] Numbness  [] Tremors  [] Speech Difficulty  [] Headaches  [] Light Sensitivity  [] Sound Sensitivity   Endocrinology []Excessive thirst  []Excessive hunger   Psychiatric [] Anxiety/Depression  [] Hallucination   Allergy/immunology []Hives/environmental allergies   Hematologic/lymph [] Abnormal bleeding  [] Abnormal bruising                   PHYSICAL EXAMINATION:       Vitals:    10/07/20 0919   BP: 138/81   Pulse: 80   Temp: 98.2 °F (36.8 °C)                                              .                                                                                                     General Appearance:  Alert, cooperative, no signs of distress, appears stated age   Head:  Normocephalic, no signs of trauma   Eyes:  Conjunctiva/corneas clear;  eyelids intact   Ears:  Normal external ear and canals   Nose: Nares normal, mucosa normal, no drainage    Throat: Lips and tongue normal; teeth normal;  gums normal   Neck: Supple, intact flexion, extension and rotation;   trachea midline;  no adenopathy;   thyroid: not enlarged;   no carotid pulse abnormality   Back:   Symmetric, no curvature, ROM adequate   Lungs:   Respirations unlabored   Heart:  Regular rate and rhythm           Extremities: Extremities normal, no cyanosis, no edema   Pulses: Symmetric over head and neck   Skin: Skin color, texture normal, no rashes, no lesions                                     NEUROLOGIC EXAMINATION    Neurologic Exam    Mental status    Alert and oriented x 3; intact memory with no confusion, speech or language problems; no hallucinations or delusions  Fund of information appropriate for level of education    Cranial nerves    II - visual fields intact to confrontation  III, IV, VI - extra-ocular muscles full: no pupillary defect; no ANTONIA, no nystagmus, no ptosis   V - normal facial Although every effort was made to ensure the accuracy of this automated transcription, some errors in transcription may have occurred. Scribe Attestation:   By signing my name below, I, Court Fent, attest that this documentation has been prepared under the direction and in the presence of Kristy Machado CNP.

## 2020-10-09 ENCOUNTER — HOSPITAL ENCOUNTER (OUTPATIENT)
Dept: LAB | Age: 65
Discharge: HOME | End: 2020-10-09
Payer: MEDICARE

## 2020-10-09 ENCOUNTER — HOSPITAL ENCOUNTER (OUTPATIENT)
Dept: PHYSICAL THERAPY | Age: 65
Setting detail: THERAPIES SERIES
Discharge: HOME | End: 2020-10-09
Payer: MEDICARE

## 2020-10-09 LAB
BUN BLDV-MCNC: 20 MG/DL (ref 8–23)
CREAT SERPL-MCNC: 0.72 MG/DL (ref 0.5–0.9)
GFR AFRICAN AMERICAN: >60 ML/MIN
GFR NON-AFRICAN AMERICAN: >60 ML/MIN
GFR SERPL CREATININE-BSD FRML MDRD: NORMAL ML/MIN/{1.73_M2}
GFR SERPL CREATININE-BSD FRML MDRD: NORMAL ML/MIN/{1.73_M2}

## 2020-10-09 PROCEDURE — 82565 ASSAY OF CREATININE: CPT

## 2020-10-09 PROCEDURE — 97032 APPL MODALITY 1+ESTIM EA 15: CPT | Performed by: PHYSICAL THERAPIST

## 2020-10-09 PROCEDURE — 97112 NEUROMUSCULAR REEDUCATION: CPT | Performed by: PHYSICAL THERAPIST

## 2020-10-09 PROCEDURE — 84520 ASSAY OF UREA NITROGEN: CPT

## 2020-10-09 PROCEDURE — 36415 COLL VENOUS BLD VENIPUNCTURE: CPT

## 2020-10-09 NOTE — FLOWSHEET NOTE
Physical Therapy Daily Treatment Note    Date:  10/9/2020    Patient Name:  Magdi Davalos    :  1955  MRN: [id number]  Restrictions/Precautions:     Medical/Treatment Diagnosis Information:   · Diagnosis: Facial paralysis/Bell's palsy  · Treatment Diagnosis: facial paralysis caused by Bell's palsy  Insurance/Certification information:  PT Insurance Information: [employer] Medicare  Physician Information:  Referring Practitioner: Mira Bennett CNP  Plan of care signed (Y/N):  Y  Visit# / total visits:  7/10  Pain level: 0/10     Time In: 10:02   Time Out: 10:59    Progress Note: []  Yes  [x]  No  Next due by: Visit #10  Or by 10/11/2020    Subjective:   \"I am a little nervous for my consult with the neurologist tomorrow, as I have never needed to see a neurologist.  I'm still having a few issues with holding liquids/swishing due to th corner of the right side of my mouth not staying shut as well. Objective:   Observation: Lacking control on R eyelid and lips for most facial exercises  Improved use and control of cheek musculature and upper lips, although still lacking compared to left side  Improved ease with peek-a-rowland exercise, nose wrinkle, and eyelid raises  Test measurements:    Corner of R eyelid is measured at 0.2cm lower than L eyelid (10/2/2020)  Corner of R lip is measured even to corner of L lip (2020)  R eyelid is measured at 0.3cm lower than left eyelid (10/9/2020)  Improved tongue poke exercise  Educated on adding emotion to facial exercises to help regulate control, coordination and proper size and scale of facial expression. Exercises:   Exercise/Equipment Resistance/Repetitions Other comments   Facial exercises:     Frown 20x    Eyebrow Raise \"    Nose Wrinkle \"    Jared-in-the-box half x 2 \"    Closed lips smile \"    Pouting \"    Full smile \"    Upper lip lick honey \"    Lip POPs \"    Blow Raspberries \"    Cheek Puff \"    \"Swish\" Air side to side \"    Vocal ex:      Ahh 15x    Mmm \" P \"    B \"    F & V \"    Shh \"              Manual: Ice cube massage 5' Used for excitatory responses in facial muscles   Manual e-stim 10'    [] Provided verbal/tactile cueing for activities related to strengthening, flexibility, endurance, ROM. (92781)  [x] Provided verbal/tactile cueing for activities related to improving balance, coordination, kinesthetic sense, posture, motor skill, proprioception. (82972)    Therapeutic Activities:     [] Therapeutic activities, direct (one-on-one) patient contact (use of dynamic activities to improve functional performance). (47430)    Gait:   [] Provided training and instruction to the patient for ambulation re-education. (95278)    Self-Care/ADL's  [] Self-care/home management training and compensatory training, meal preparation, safety procedures, and instructions in use of assistive technology devices/adaptive equipment, direct one-on-one contact. (69218)    Home Exercise Program:     [] Reviewed/Progressed HEP activities related to strengthening, flexibility, endurance, ROM. (29569)  [x] Reviewed/Progressed HEP activities related to improving balance, coordination, kinesthetic sense, posture, motor skill, proprioception.  (53748)    Manual Treatments:    [] Provided manual therapy to mobilize soft tissue/joints for the purpose of modulating pain, promoting relaxation,  increasing ROM, reducing/eliminating soft tissue swelling/inflammation/restriction, improving soft tissue extensibility.  (84742)    Service Based Modalities:      Timed Code Treatment Minutes:  43' neuro re-ed       15' Manual e-stim     Total Treatment Minutes:  62'      Treatment/Activity Tolerance:  [x] Patient tolerated treatment well [] Patient limited by fatique  [] Patient limited by pain  [] Patient limited by other medical complications  [] Other:     Prognosis: [x] Good [] Fair  [] Poor    Patient Requires Follow-up: [x] Yes  [] No      Goals:  Short term goals  Time Frame for Short term goals: 2 weeks  Short term goal 1: Initiate HEP MET  Short term goal 2: Increase facial posture/strength for corner of lip with 0.6cm and corner of eyelid within 0.4cm compared to left side for improved facial symmetry MET  Short term goal 3: Increase temporalis and masseter strength to Fair for improved chewing and mastication for meals    Long term goals  Time Frame for Long term goals : 4 weeks  Long term goal 1: Indep with HEP  Long term goal 2: Increase facial posture/strength for corner of lip with 0.2cm and corner of eyelid within 0.1cm compared to left side for improved facial symmetry  Long term goal 3:  Increase temporalis and masseter strength to Fair+ for improved chewing and mastication for meals  Long term goal 4: Pt to be able to drink with a straw without loss of liquid for improved methods of consuming liquids for hydration  Long term goal 5: Pt to self-report 90% improvement in facial symmetry and activities involved with eating/drinking per self-report for return to previous level of function    Plan:   [x] Continue per plan of care [] Alter current plan (see comments)  [] Plan of care initiated [] Hold pending MD visit [] Discharge    Plan for Next Session:  Progress as tolerated    Electronically signed by:  Greg Miller DPT

## 2020-10-16 ENCOUNTER — HOSPITAL ENCOUNTER (OUTPATIENT)
Dept: PHYSICAL THERAPY | Age: 65
Setting detail: THERAPIES SERIES
Discharge: HOME | End: 2020-10-16
Payer: MEDICARE

## 2020-10-16 PROCEDURE — 97110 THERAPEUTIC EXERCISES: CPT | Performed by: PHYSICAL THERAPIST

## 2020-10-16 PROCEDURE — 97032 APPL MODALITY 1+ESTIM EA 15: CPT | Performed by: PHYSICAL THERAPIST

## 2020-10-16 NOTE — FLOWSHEET NOTE
Physical Therapy Daily Treatment Note    Date:  10/16/2020    Patient Name:  Raegan Gee    :  1955  MRN: [id number]  Restrictions/Precautions:     Medical/Treatment Diagnosis Information:   · Diagnosis: Facial paralysis/Bell's palsy  · Treatment Diagnosis: facial paralysis caused by Bell's palsy  Insurance/Certification information:  PT Insurance Information: [employer]na Medicare  Physician Information:  Referring Practitioner: Javy Grey CNP  Plan of care signed (Y/N):  Y  Visit# / total visits:  8/10  Pain level: 0/10     Time In: 10:05   Time Out: 10:59    Progress Note: []  Yes  [x]  No  Next due by: Visit #10  Or by 10/11/2020    Subjective:   \"I am a little nervous for my consult with the neurologist tomorrow, as I have never needed to see a neurologist.  I'm still having a few issues with holding liquids/swishing due to th corner of the right side of my mouth not staying shut as well. Objective:   Observation: Lacking control on R eyelid and lips for most facial exercises  Improved use and control of cheek musculature and upper lips, although still lacking compared to left side  Improved ease with peek-a-rowland exercise, nose wrinkle, and eyelid raises  Test measurements:    Corner of R eyelid is measured at 0.2cm lower than L eyelid (10/2/2020)  Corner of R lip is measured even to corner of L lip (2020)  R eyelid is measured at 0.3cm lower than left eyelid (10/9/2020)  Improved tongue poke exercise  Educated on adding emotion to facial exercises to help regulate control, coordination and proper size and scale of facial expression. Exercises:   Exercise/Equipment Resistance/Repetitions Other comments   Facial exercises:     Frown 20x    Eyebrow Raise \"    Nose Wrinkle \"    Jared-in-the-box half x 2 \"    Closed lips smile \"    Pouting \"    Full smile \"    Upper lip lick honey \"    Lip POPs \"    Blow Raspberries \"    Cheek Puff \"    \"Swish\" Air side to side \"    Vocal ex:      Ahh 15x    Mmm \" goals: 2 weeks  Short term goal 1: Initiate HEP MET  Short term goal 2: Increase facial posture/strength for corner of lip with 0.6cm and corner of eyelid within 0.4cm compared to left side for improved facial symmetry MET  Short term goal 3: Increase temporalis and masseter strength to Fair for improved chewing and mastication for meals MET    Long term goals  Time Frame for Long term goals : 4 weeks  Long term goal 1: Indep with HEP  Long term goal 2: Increase facial posture/strength for corner of lip with 0.2cm and corner of eyelid within 0.1cm compared to left side for improved facial symmetry partially met  Long term goal 3:  Increase temporalis and masseter strength to Fair+ for improved chewing and mastication for meals partially met  Long term goal 4: Pt to be able to drink with a straw without loss of liquid for improved methods of consuming liquids for hydration in progress  Long term goal 5: Pt to self-report 90% improvement in facial symmetry and activities involved with eating/drinking per self-report for return to previous level of function in progress    Plan:   [x] Continue per plan of care [] Alter current plan (see comments)  [] Plan of care initiated [] Hold pending MD visit [] Discharge    Plan for Next Session:  Progress as tolerated    Electronically signed by:  Marlene Rowell DPT

## 2020-10-19 ENCOUNTER — HOSPITAL ENCOUNTER (OUTPATIENT)
Dept: MRI IMAGING | Age: 65
Discharge: HOME | End: 2020-10-21
Payer: MEDICARE

## 2020-10-19 PROCEDURE — A9579 GAD-BASE MR CONTRAST NOS,1ML: HCPCS | Performed by: NURSE PRACTITIONER

## 2020-10-19 PROCEDURE — 70553 MRI BRAIN STEM W/O & W/DYE: CPT

## 2020-10-19 PROCEDURE — 6360000004 HC RX CONTRAST MEDICATION: Performed by: NURSE PRACTITIONER

## 2020-10-19 RX ADMIN — GADOTERIDOL 15 ML: 279.3 INJECTION, SOLUTION INTRAVENOUS at 12:05

## 2020-10-23 ENCOUNTER — HOSPITAL ENCOUNTER (OUTPATIENT)
Dept: PHYSICAL THERAPY | Age: 65
Setting detail: THERAPIES SERIES
Discharge: HOME | End: 2020-10-23
Payer: MEDICARE

## 2020-10-23 PROCEDURE — 97032 APPL MODALITY 1+ESTIM EA 15: CPT | Performed by: PHYSICAL THERAPIST

## 2020-10-23 PROCEDURE — 97112 NEUROMUSCULAR REEDUCATION: CPT | Performed by: PHYSICAL THERAPIST

## 2020-10-23 NOTE — FLOWSHEET NOTE
Provided verbal/tactile cueing for activities related to strengthening, flexibility, endurance, ROM. (15601)  [x] Provided verbal/tactile cueing for activities related to improving balance, coordination, kinesthetic sense, posture, motor skill, proprioception. (01173)    Therapeutic Activities:     [] Therapeutic activities, direct (one-on-one) patient contact (use of dynamic activities to improve functional performance). (20048)    Gait:   [] Provided training and instruction to the patient for ambulation re-education. (91730)    Self-Care/ADL's  [] Self-care/home management training and compensatory training, meal preparation, safety procedures, and instructions in use of assistive technology devices/adaptive equipment, direct one-on-one contact. (73169)    Home Exercise Program:     [] Reviewed/Progressed HEP activities related to strengthening, flexibility, endurance, ROM. (37509)  [x] Reviewed/Progressed HEP activities related to improving balance, coordination, kinesthetic sense, posture, motor skill, proprioception.  (50013)    Manual Treatments:    [] Provided manual therapy to mobilize soft tissue/joints for the purpose of modulating pain, promoting relaxation,  increasing ROM, reducing/eliminating soft tissue swelling/inflammation/restriction, improving soft tissue extensibility. (48340)    Service Based Modalities:      Timed Code Treatment Minutes:  36' neuro re-ed       15' Manual e-stim     Total Treatment Minutes:  54'      Treatment/Activity Tolerance:  [x] Patient tolerated treatment well [] Patient limited by fatique  [] Patient limited by pain  [] Patient limited by other medical complications  [] Other:     Prognosis: [x] Good [] Fair  [] Poor    Patient Requires Follow-up: [x] Yes  [] No      Goals:  Short term goals  Time Frame for Short term goals: 2 weeks  Short term goal 1: Initiate HEP MET  Short term goal 2:  Increase facial posture/strength for corner of lip with 0.6cm and corner of eyelid within 0.4cm compared to left side for improved facial symmetry MET  Short term goal 3: Increase temporalis and masseter strength to Fair for improved chewing and mastication for meals MET    Long term goals  Time Frame for Long term goals : 4 weeks  Long term goal 1: Indep with HEP  Long term goal 2: Increase facial posture/strength for corner of lip with 0.2cm and corner of eyelid within 0.1cm compared to left side for improved facial symmetry partially met  Long term goal 3:  Increase temporalis and masseter strength to Fair+ for improved chewing and mastication for meals partially met  Long term goal 4: Pt to be able to drink with a straw without loss of liquid for improved methods of consuming liquids for hydration in progress  Long term goal 5: Pt to self-report 90% improvement in facial symmetry and activities involved with eating/drinking per self-report for return to previous level of function in progress    Plan:   [x] Continue per plan of care [] Alter current plan (see comments)  [] Plan of care initiated [] Hold pending MD visit [] Discharge    Plan for Next Session:  Progress as tolerated at 1x per week    Electronically signed by:  Tom Munoz DPT

## 2020-10-28 ENCOUNTER — HOSPITAL ENCOUNTER (OUTPATIENT)
Dept: PHYSICAL THERAPY | Age: 65
Setting detail: THERAPIES SERIES
Discharge: HOME | End: 2020-10-28
Payer: MEDICARE

## 2020-10-28 PROCEDURE — 97112 NEUROMUSCULAR REEDUCATION: CPT | Performed by: PHYSICAL THERAPIST

## 2020-10-28 PROCEDURE — 97032 APPL MODALITY 1+ESTIM EA 15: CPT | Performed by: PHYSICAL THERAPIST

## 2020-10-28 NOTE — FLOWSHEET NOTE
Physical Therapy Daily Treatment Note    Date:  10/28/2020    Patient Name:  Linda Avendaño    :  1955  MRN: [id number]  Restrictions/Precautions:     Medical/Treatment Diagnosis Information:   · Diagnosis: Facial paralysis/Bell's palsy  · Treatment Diagnosis: facial paralysis caused by Bell's palsy  Insurance/Certification information:  PT Insurance Information: [employer]na Medicare  Physician Information:  Referring Practitioner: Alexander Frazier CNP  Plan of care signed (Y/N):  Y  Visit# / total visits:  10/10  Pain level: 0/10     Time In: 10:04   Time Out: 11:00    Progress Note: []  Yes  [x]  No  Next due by: Visit #10  Or by 10/11/2020    Subjective:   \"I did the new exercises at home, and noticed that the tongue depressor exercises are quite difficult. \"    Objective:   Observation: Lacking control on R eyelid and lips for most facial exercises  Improved use and control of cheek musculature and upper lips, although still lacking compared to left side  Improved ease with peek-a-rowland exercise, nose wrinkle, and eyelid raises  Test measurements:    R eyelid is measured at 0.2cm lower than left eyelid (10/23/2020)  Improved tongue poke exercise  Educated on adding tongue depressor exercises for increasing tongue and lip strength     Exercises:   Exercise/Equipment Resistance/Repetitions Other comments   Facial exercises:     Frown 20x    Eyebrow Raise \"    Nose Wrinkle \"    Jared-in-the-box half x 2 \"    Closed lips smile \"    Pouting \"    Full smile \"    Upper lip lick honey \"    Lip POPs \"    Jenn Parent \"    Cheek Puff \"    \"Swish\" Air side to side \"    Vocal ex: Ahh 15x    Mmm \"    P \"    B \"    F & V \"    Shh \"    Tongue depressor ex 15x each Added 10/23/2020        Manual: Ice cube massage 5' Used for excitatory responses in facial muscles   Manual e-stim 10' Pulsed & DC (DC added this date)   [] Provided verbal/tactile cueing for activities related to strengthening, flexibility, endurance, ROM. (91707)  [x] Provided verbal/tactile cueing for activities related to improving balance, coordination, kinesthetic sense, posture, motor skill, proprioception. (87350)    Therapeutic Activities:     [] Therapeutic activities, direct (one-on-one) patient contact (use of dynamic activities to improve functional performance). (95770)    Gait:   [] Provided training and instruction to the patient for ambulation re-education. (90463)    Self-Care/ADL's  [] Self-care/home management training and compensatory training, meal preparation, safety procedures, and instructions in use of assistive technology devices/adaptive equipment, direct one-on-one contact. (62936)    Home Exercise Program:     [] Reviewed/Progressed HEP activities related to strengthening, flexibility, endurance, ROM. (51880)  [x] Reviewed/Progressed HEP activities related to improving balance, coordination, kinesthetic sense, posture, motor skill, proprioception.  (20070)    Manual Treatments:    [] Provided manual therapy to mobilize soft tissue/joints for the purpose of modulating pain, promoting relaxation,  increasing ROM, reducing/eliminating soft tissue swelling/inflammation/restriction, improving soft tissue extensibility. (90085)    Service Based Modalities:      Timed Code Treatment Minutes:  39' neuro re-ed       15' Manual e-stim     Total Treatment Minutes:  64'      Treatment/Activity Tolerance:  [x] Patient tolerated treatment well [] Patient limited by fatique  [] Patient limited by pain  [] Patient limited by other medical complications  [] Other:     Prognosis: [x] Good [] Fair  [] Poor    Patient Requires Follow-up: [x] Yes  [] No      Goals:  Short term goals  Time Frame for Short term goals: 2 weeks  Short term goal 1: Initiate HEP MET  Short term goal 2: Increase facial posture/strength for corner of lip with 0.6cm and corner of eyelid within 0.4cm compared to left side for improved facial symmetry MET  Short term goal 3:  Increase temporalis and masseter strength to Fair for improved chewing and mastication for meals MET    Long term goals  Time Frame for Long term goals : 4 weeks  Long term goal 1: Indep with HEP  Long term goal 2: Increase facial posture/strength for corner of lip with 0.2cm and corner of eyelid within 0.1cm compared to left side for improved facial symmetry partially met  Long term goal 3:  Increase temporalis and masseter strength to Fair+ for improved chewing and mastication for meals partially met  Long term goal 4: Pt to be able to drink with a straw without loss of liquid for improved methods of consuming liquids for hydration in progress  Long term goal 5: Pt to self-report 90% improvement in facial symmetry and activities involved with eating/drinking per self-report for return to previous level of function in progress    Plan:   [x] Continue per plan of care [] Alter current plan (see comments)  [] Plan of care initiated [] Hold pending MD visit [] Discharge    Plan for Next Session:  Progress as tolerated at 1x per week    Electronically signed by:  Reji Islas DPT

## 2020-10-29 ENCOUNTER — PATIENT MESSAGE (OUTPATIENT)
Dept: INTERNAL MEDICINE | Age: 65
End: 2020-10-29

## 2020-10-29 NOTE — TELEPHONE ENCOUNTER
From: Qian Calvillo  To: SCOTT Motta CNP  Sent: 10/29/2020 1:32 PM EDT  Subject: Test Results Question    Chayito Holloway, did you receive my MRI results from October 19th? Do you have any concerns, specifically with the chronic microvascular disease?

## 2020-11-04 ENCOUNTER — PATIENT MESSAGE (OUTPATIENT)
Dept: INTERNAL MEDICINE | Age: 65
End: 2020-11-04

## 2020-11-04 NOTE — TELEPHONE ENCOUNTER
From: Qian Calvillo  To: Edgar Wood APRN - CNP  Sent: 11/4/2020 2:31 PM EST  Subject: Prescription Question    Scot Wan. Pernell Amaya I received a phone call yesterday from Oleg Bauer regarding my MRI results. She was concerned with my elevated LDL cholesterol count and highly recommended I go back on a statin. You had advised me to do so also back in September. Beatriz Rodriguez suggested a low dose of Lipitor. What are your thoughts, please?

## 2020-11-05 ENCOUNTER — PATIENT MESSAGE (OUTPATIENT)
Dept: INTERNAL MEDICINE | Age: 65
End: 2020-11-05

## 2020-11-05 RX ORDER — ROSUVASTATIN CALCIUM 5 MG/1
5 TABLET, COATED ORAL NIGHTLY
Qty: 90 TABLET | Refills: 3 | Status: SHIPPED | OUTPATIENT
Start: 2020-11-05 | End: 2021-09-27

## 2020-11-05 NOTE — TELEPHONE ENCOUNTER
From: Qian Calvillo  To: SCOTT Stewart CNP  Sent: 11/5/2020 8:43 AM EST  Subject: Prescription Question    Thank you, David Hammonds, for your quick action.      ----- Message -----   From:SCOTT Alexander CNP   Sent:11/5/2020 8:39 AM EST   To:Qian Calvillo   Subject:RE: Prescription Question    I did send the Crest[facility] to [facility]. It may need a prior authorization - which should go directly to our office if required. Given you have a documented history of statin intolerance, I hope we should be able to get this covered. If you have any problems with cost or insurance, please let the office know. Thanks,  David Hammonds      ----- Message -----   From:Qain Calvillo   Sent:11/5/2020 8:15 AM EST   To:SCOTT Alexander CNP   Subject:RE: Prescription Question    Yes, David Hammonds, I remember you talking about Crestor. I will try it. A prescription will have to go through [facility]. It may need prior authorization?      ----- Message -----   From:SCOTT Alexander CNP   Sent:11/5/2020 7:58 AM EST   To:Qian Calvillo   Subject:RE: Prescription Question    Scot Laughlin,    As you had previously noted some intolerance to other statins, I would recommend crestor - this can be dosed lower and often has a better side effect profile for patients that have not previously tolerated other statins. If you are agreeable, I can send this in for you. Thanks,  David Hammonds      ----- Message -----   From:Qian Calvillo   Sent:11/4/2020 2:31 PM EST   To:SCOTT Alexander  CNP   Subject:Prescription Question    Joshua Hammonds. Ganesh Hartley I received a phone call yesterday from Iveth Redd regarding my MRI results. She was concerned with my elevated LDL cholesterol count and highly recommended I go back on a statin. You had advised me to do so also back in September. Edwige Osman suggested a low dose of Lipitor. What are your thoughts, please?

## 2020-11-06 ENCOUNTER — HOSPITAL ENCOUNTER (OUTPATIENT)
Dept: PHYSICAL THERAPY | Age: 65
Setting detail: THERAPIES SERIES
Discharge: HOME | End: 2020-11-06
Payer: MEDICARE

## 2020-11-06 PROCEDURE — 97032 APPL MODALITY 1+ESTIM EA 15: CPT | Performed by: PHYSICAL THERAPIST

## 2020-11-06 PROCEDURE — 97112 NEUROMUSCULAR REEDUCATION: CPT | Performed by: PHYSICAL THERAPIST

## 2020-11-06 NOTE — FLOWSHEET NOTE
Physical Therapy Daily Treatment Note    Date:  2020    Patient Name:  Ramonita Christina    :  1955  MRN: [id number]  Restrictions/Precautions:     Medical/Treatment Diagnosis Information:   · Diagnosis: Facial paralysis/Bell's palsy  · Treatment Diagnosis: facial paralysis caused by Bell's palsy  Insurance/Certification information:  PT Insurance Information: [employer] Medicare  Physician Information:  Referring Practitioner: Dagmar Hinojosa CNP  Plan of care signed (Y/N):  Y  Visit# / total visits:  1/10   11 visits total  Pain level: 0/10     Time In: 11:02   Time Out: 11:56    Progress Note: []  Yes  [x]  No  Next due by: Visit #10  Or by 10/11/2020    Subjective:   \"I've noticed new tingling sensations along the R cheek and upper lip the past week. Sometimes the lip quivers or twitches, and there's not really any warning when either of those will happen. \"    Objective:   Observation: Lacking control on R eyelid and lips for most facial exercises  Improved use and control of cheek musculature and upper lips, although still lacking compared to left side  Improved ease with peek-a-rowland exercise, nose wrinkle, and eyelid raises  Test measurements:    R eyelid is measured at 0.2cm lower than left eyelid (10/23/2020)  Improved tongue poke exercise  Educated on adding tongue depressor exercises for increasing tongue and lip strength     Exercises:   Exercise/Equipment Resistance/Repetitions Other comments   Facial exercises:     Frown 2-3\"  20x    Furrowed brows to Eyebrow Raise 20x Initiated 2020   Nose Wrinkle \"    Jared-in-the-box half x 2 \"    Closed lips smile \"    Pouting \"    Full smile to pursed lips \" Initiated 8717   Upper lip lick honey \"    Lip POPs \"    Elliot Ken \"    Cheek Puff \"    \"Swish\" Air side to side \"    Vocal ex:      Ahh 15x    Mmm \"    P \"    B \"    F & V \"    Shh \"    Tongue depressor ex 15x each Added 10/23/2020        Manual: Ice cube massage 5' Used for [employer] responses in facial muscles   Manual e-stim 10' Pulsed & DC (DC added this date)   [] Provided verbal/tactile cueing for activities related to strengthening, flexibility, endurance, ROM. (51115)  [x] Provided verbal/tactile cueing for activities related to improving balance, coordination, kinesthetic sense, posture, motor skill, proprioception. (30076)    Therapeutic Activities:     [] Therapeutic activities, direct (one-on-one) patient contact (use of dynamic activities to improve functional performance). (03572)    Gait:   [] Provided training and instruction to the patient for ambulation re-education. (82411)    Self-Care/ADL's  [] Self-care/home management training and compensatory training, meal preparation, safety procedures, and instructions in use of assistive technology devices/adaptive equipment, direct one-on-one contact. (49497)    Home Exercise Program:     [] Reviewed/Progressed HEP activities related to strengthening, flexibility, endurance, ROM. (53231)  [x] Reviewed/Progressed HEP activities related to improving balance, coordination, kinesthetic sense, posture, motor skill, proprioception.  (32259)    Manual Treatments:    [] Provided manual therapy to mobilize soft tissue/joints for the purpose of modulating pain, promoting relaxation,  increasing ROM, reducing/eliminating soft tissue swelling/inflammation/restriction, improving soft tissue extensibility.  (31826)    Service Based Modalities:      Timed Code Treatment Minutes:  44' neuro re-ed       15' Manual e-stim     Total Treatment Minutes:  47'      Treatment/Activity Tolerance:  [x] Patient tolerated treatment well [] Patient limited by fatique  [] Patient limited by pain  [] Patient limited by other medical complications  [] Other:     Prognosis: [x] Good [] Fair  [] Poor    Patient Requires Follow-up: [x] Yes  [] No      Goals:  Short term goals  Time Frame for Short term goals: 2 weeks  Short term goal 1: Initiate HEP MET  Short term goal 2: Increase facial posture/strength for corner of lip with 0.6cm and corner of eyelid within 0.4cm compared to left side for improved facial symmetry MET  Short term goal 3: Increase temporalis and masseter strength to Fair for improved chewing and mastication for meals MET    Long term goals  Time Frame for Long term goals : 4 weeks  Long term goal 1: Indep with HEP  Long term goal 2: Increase facial posture/strength for corner of lip with 0.2cm and corner of eyelid within 0.1cm compared to left side for improved facial symmetry partially met  Long term goal 3:  Increase temporalis and masseter strength to Fair+ for improved chewing and mastication for meals partially met  Long term goal 4: Pt to be able to drink with a straw without loss of liquid for improved methods of consuming liquids for hydration in progress  Long term goal 5: Pt to self-report 90% improvement in facial symmetry and activities involved with eating/drinking per self-report for return to previous level of function in progress    Plan:   [x] Continue per plan of care [] Alter current plan (see comments)  [] Plan of care initiated [] Hold pending MD visit [] Discharge    Plan for Next Session:  Progress as tolerated at 1x per week    Electronically signed by:  Adriana Butler DPT

## 2020-11-10 ENCOUNTER — HOSPITAL ENCOUNTER (OUTPATIENT)
Dept: BONE DENSITY | Age: 65
Discharge: HOME | End: 2020-11-12
Payer: MEDICARE

## 2020-11-10 PROCEDURE — 77085 DXA BONE DENSITY AXL VRT FX: CPT

## 2020-11-17 ENCOUNTER — HOSPITAL ENCOUNTER (OUTPATIENT)
Dept: PHYSICAL THERAPY | Age: 65
Setting detail: THERAPIES SERIES
Discharge: HOME | End: 2020-11-17
Payer: MEDICARE

## 2020-11-17 PROCEDURE — 97032 APPL MODALITY 1+ESTIM EA 15: CPT | Performed by: PHYSICAL THERAPIST

## 2020-11-17 PROCEDURE — 97112 NEUROMUSCULAR REEDUCATION: CPT | Performed by: PHYSICAL THERAPIST

## 2020-11-17 NOTE — FLOWSHEET NOTE
Physical Therapy Daily Treatment Note    Date:  2020    Patient Name:  Oscar Mcfarland    :  1955  MRN: [id number]  Restrictions/Precautions:     Medical/Treatment Diagnosis Information:   · Diagnosis: Facial paralysis/Bell's palsy  · Treatment Diagnosis: facial paralysis caused by Bell's palsy  Insurance/Certification information:  PT Insurance Information: A[employer]na Medicare  Physician Information:  Referring Practitioner: Presley Abraham CNP  Plan of care signed (Y/N):  Y  Visit# / total visits:  2/10   12 visits total  Pain level: 0/10     Time In: 11:02   Time Out: 12:00    Progress Note: []  Yes  [x]  No  Next due by: Visit #10  Or by 10/11/2020    Subjective:   \"I've noticed new tingling sensations along the R cheek and upper lip the past week. Sometimes the lip quivers or twitches, and there's not really any warning when either of those will happen. \"    Objective:   Observation: Lacking control on R eyelid and lips for most facial exercises  Continues to lack control and strength of eyebrow raising and furrowing, improved ease with closing R corner of mouth/lips, although still lacking with endurance  Test measurements:    R eyelid is measured at 0.2cm lower than left eyelid (10/23/2020)  Improved tongue poke exercise  Educated on adding tongue depressor exercises for increasing tongue and lip strength     Exercises:   Exercise/Equipment Resistance/Repetitions Other comments   Facial exercises:     Frown 2-3\"  20x    Furrowed brows to Eyebrow Raise 20x Initiated 2020   Nose Wrinkle \"    Jared-in-the-box half x 2 \"    Closed lips smile \"    Pouting \"    Full smile to pursed lips \" Initiated    Upper lip lick honey \"    Lip POPs \"    Glennda Helms \"    Cheek Puff \"    \"Swish\" Air side to side \"    Vocal ex:      Ahh 15x    Mmm \"    P \"    B \"    F & V \"    [address] \"    Ghada Jeannette with furrowed brow and raised brows 15x each  HEP initiated 2020        Tongue depressor ex 15x each Added 10/23/2020        Manual: Ice cube massage 5' Used for excitatory responses in facial muscles   Manual e-stim 10' Pulsed & DC (DC added this date)   [] Provided verbal/tactile cueing for activities related to strengthening, flexibility, endurance, ROM. (96311)  [x] Provided verbal/tactile cueing for activities related to improving balance, coordination, kinesthetic sense, posture, motor skill, proprioception. (05589)    Therapeutic Activities:     [] Therapeutic activities, direct (one-on-one) patient contact (use of dynamic activities to improve functional performance). (67707)    Gait:   [] Provided training and instruction to the patient for ambulation re-education. (52384)    Self-Care/ADL's  [] Self-care/home management training and compensatory training, meal preparation, safety procedures, and instructions in use of assistive technology devices/adaptive equipment, direct one-on-one contact. (72192)    Home Exercise Program:     [] Reviewed/Progressed HEP activities related to strengthening, flexibility, endurance, ROM. (69279)  [x] Reviewed/Progressed HEP activities related to improving balance, coordination, kinesthetic sense, posture, motor skill, proprioception.  (28549)    Manual Treatments:    [] Provided manual therapy to mobilize soft tissue/joints for the purpose of modulating pain, promoting relaxation,  increasing ROM, reducing/eliminating soft tissue swelling/inflammation/restriction, improving soft tissue extensibility.  (67654)    Service Based Modalities:      Timed Code Treatment Minutes:  37' neuro re-ed        15' Manual e-stim     Total Treatment Minutes:  62'      Treatment/Activity Tolerance:  [x] Patient tolerated treatment well [] Patient limited by fatique  [] Patient limited by pain  [] Patient limited by other medical complications  [] Other:     Prognosis: [x] Good [] Fair  [] Poor    Patient Requires Follow-up: [x] Yes  [] No      Goals:  Short term goals  Time Frame for Short term goals: 2 weeks  Short term goal 1: Initiate HEP MET  Short term goal 2: Increase facial posture/strength for corner of lip with 0.6cm and corner of eyelid within 0.4cm compared to left side for improved facial symmetry MET  Short term goal 3: Increase temporalis and masseter strength to Fair for improved chewing and mastication for meals MET    Long term goals  Time Frame for Long term goals : 4 weeks  Long term goal 1: Indep with HEP  Long term goal 2: Increase facial posture/strength for corner of lip with 0.2cm and corner of eyelid within 0.1cm compared to left side for improved facial symmetry partially met  Long term goal 3:  Increase temporalis and masseter strength to Fair+ for improved chewing and mastication for meals partially met  Long term goal 4: Pt to be able to drink with a straw without loss of liquid for improved methods of consuming liquids for hydration in progress  Long term goal 5: Pt to self-report 90% improvement in facial symmetry and activities involved with eating/drinking per self-report for return to previous level of function in progress    Plan:   [x] Continue per plan of care [] Alter current plan (see comments)  [] Plan of care initiated [] Hold pending MD visit [] Discharge    Plan for Next Session:  Progress as tolerated at 1x per week    Electronically signed by:  Lou Hubbard DPT

## 2020-11-24 ENCOUNTER — HOSPITAL ENCOUNTER (OUTPATIENT)
Dept: PHYSICAL THERAPY | Age: 65
Setting detail: THERAPIES SERIES
Discharge: HOME | End: 2020-11-24
Payer: MEDICARE

## 2020-11-24 PROCEDURE — 97032 APPL MODALITY 1+ESTIM EA 15: CPT | Performed by: PHYSICAL THERAPIST

## 2020-11-24 PROCEDURE — 97112 NEUROMUSCULAR REEDUCATION: CPT | Performed by: PHYSICAL THERAPIST

## 2020-11-24 NOTE — FLOWSHEET NOTE
Physical Therapy Daily Treatment Note    Date:  2020    Patient Name:  Michelle Scheuermann    :  1955  MRN: [id number]  Restrictions/Precautions:     Medical/Treatment Diagnosis Information:   · Diagnosis: Facial paralysis/Bell's palsy  · Treatment Diagnosis: facial paralysis caused by Bell's palsy  Insurance/Certification information:  PT Insurance Information: A[employer]na Medicare  Physician Information:  Referring Practitioner: Juanita Pham CNP  Plan of care signed (Y/N):  Y  Visit# / total visits:  3/10   13 visits total  Pain level: 0/10     Time In: 11:06   Time Out: 11:59    Progress Note: []  Yes  [x]  No  Next due by: Visit #10  Or by 10/11/2020    Subjective:   \"I've noticed new tingling sensations along the R cheek and upper lip the past week. Sometimes the lip quivers or twitches, and there's not really any warning when either of those will happen. \"    Objective:   Observation: Lacking control on R eyelid and lips for most facial exercises  Continues to lack control and strength of eyebrow raising and furrowing, improved ease with closing R corner of mouth/lips, although still lacking with endurance  Test measurements:    R eyelid is measured at 0.2cm lower than left eyelid (10/23/2020)  Improved control of corner of mouth. Able to puff R cheek approximately 20-30% of left cheek, noticeable improvement with this exercise. Educated on progressing tongue depressor exercises for increasing tongue and lip strength     Exercises:   Exercise/Equipment Resistance/Repetitions Other comments   Facial exercises:     Frown 2-3\"  20x    Furrowed brows to Eyebrow Raise 20x Initiated 2020   Nose Wrinkle \"    Jared-in-the-box half x 2 \"    Closed lips smile \"    Pouting \"    Full smile to pursed lips \" Initiated    Upper lip lick honey \"    Lip POPs \"    Noah Gould \"    Cheek Puff \"    \"Swish\" Air side to side \"    Vocal ex:      Ahh 15x    Mmm \"    P \"    B \"    F & V \"    [address] \"    PEYTON Vasquez, Prognosis: [x] Good [] Fair  [] Poor    Patient Requires Follow-up: [x] Yes  [] No      Goals:  Short term goals  Time Frame for Short term goals: 2 weeks  Short term goal 1: Initiate HEP MET  Short term goal 2: Increase facial posture/strength for corner of lip with 0.6cm and corner of eyelid within 0.4cm compared to left side for improved facial symmetry MET  Short term goal 3: Increase temporalis and masseter strength to Fair for improved chewing and mastication for meals MET    Long term goals  Time Frame for Long term goals : 4 weeks  Long term goal 1: Indep with HEP  Long term goal 2: Increase facial posture/strength for corner of lip with 0.2cm and corner of eyelid within 0.1cm compared to left side for improved facial symmetry partially met  Long term goal 3:  Increase temporalis and masseter strength to Fair+ for improved chewing and mastication for meals partially met  Long term goal 4: Pt to be able to drink with a straw without loss of liquid for improved methods of consuming liquids for hydration in progress  Long term goal 5: Pt to self-report 90% improvement in facial symmetry and activities involved with eating/drinking per self-report for return to previous level of function in progress    Plan:   [x] Continue per plan of care [] Alter current plan (see comments)  [] Plan of care initiated [] Hold pending MD visit [] Discharge    Plan for Next Session:  Progress as tolerated at 1x per week    Electronically signed by:  Tashia Sepulveda DPT

## 2020-12-01 ENCOUNTER — HOSPITAL ENCOUNTER (OUTPATIENT)
Dept: PHYSICAL THERAPY | Age: 65
Setting detail: THERAPIES SERIES
Discharge: HOME | End: 2020-12-01
Payer: MEDICARE

## 2020-12-01 PROCEDURE — 97032 APPL MODALITY 1+ESTIM EA 15: CPT | Performed by: PHYSICAL THERAPIST

## 2020-12-01 PROCEDURE — 97112 NEUROMUSCULAR REEDUCATION: CPT | Performed by: PHYSICAL THERAPIST

## 2020-12-01 NOTE — FLOWSHEET NOTE
Physical Therapy Daily Treatment Note    Date:  2020    Patient Name:  Mathieu Morales    :  1955  MRN: [id number]  Restrictions/Precautions:     Medical/Treatment Diagnosis Information:   · Diagnosis: Facial paralysis/Bell's palsy  · Treatment Diagnosis: facial paralysis caused by Bell's palsy  Insurance/Certification information:  PT Insurance Information: A[employer]na Medicare  Physician Information:  Referring Practitioner: Laly Hamlin CNP  Plan of care signed (Y/N):  Y  Visit# / total visits:  4/10   14 visits total  Pain level: 0/10     Time In: 11:07   Time Out: 12:01    Progress Note: []  Yes  [x]  No  Next due by: Visit #10  Or by 10/11/2020    Subjective:   \"I have been working on the vocalizations and they've been getting easier. My lip stays closed a little better, but still having some issues with that and the eyelid still drops and I can't keep my eye open as well. \"    Objective:   Observation: Lacking control on R eyelid and lips for most facial exercises  Continues to lack control and strength of eyebrow raising and furrowing, improved ease with closing R corner of mouth/lips, although still lacking with endurance  Test measurements:    R eyelid is measured at 0.2cm lower than left eyelid (10/23/2020)  Improved control of corner of mouth. Able to puff R cheek approximately 20-30% of left cheek, noticeable improvement with this exercise. Educated on progressing tongue depressor exercises for increasing tongue and lip strength     Exercises:   Exercise/Equipment Resistance/Repetitions Other comments   Facial exercises:     Frown 2-3\"  20x With emphasis to right    Furrowed brows to Eyebrow Raise 20x Initiated 2020   Nose Wrinkle \"    Jared-in-the-box half x 2 \"    Closed lips smile \"    Pouting \"    Full smile to pursed lips \" Initiated    Upper lip lick honey \"    Lip POPs \"    Dois Carbo \"    Cheek Puff \"    \"Swish\" Air side to side \"    Vocal ex:      Ahh 15x    Mmm \" P \"    B \"    F & V \"    [address] \"    Francine Toscano, M, V, B, Sh, T, K 5x    Ooo's with furrowed brow and raised brows 15x each  HEP initiated 11/17/2020        Tongue depressor ex 15x each Added 10/23/2020        Manual: Ice cube massage 5' Used for excitatory responses in facial muscles   Manual e-stim 10' Pulsed & DC (DC added this date)   [] Provided verbal/tactile cueing for activities related to strengthening, flexibility, endurance, ROM. (55378)  [x] Provided verbal/tactile cueing for activities related to improving balance, coordination, kinesthetic sense, posture, motor skill, proprioception. (06994)    Therapeutic Activities:     [] Therapeutic activities, direct (one-on-one) patient contact (use of dynamic activities to improve functional performance). (42106)    Gait:   [] Provided training and instruction to the patient for ambulation re-education. (93178)    Self-Care/ADL's  [] Self-care/home management training and compensatory training, meal preparation, safety procedures, and instructions in use of assistive technology devices/adaptive equipment, direct one-on-one contact. (65628)    Home Exercise Program:     [] Reviewed/Progressed HEP activities related to strengthening, flexibility, endurance, ROM. (69668)  [x] Reviewed/Progressed HEP activities related to improving balance, coordination, kinesthetic sense, posture, motor skill, proprioception.  (70094)    Manual Treatments:    [] Provided manual therapy to mobilize soft tissue/joints for the purpose of modulating pain, promoting relaxation,  increasing ROM, reducing/eliminating soft tissue swelling/inflammation/restriction, improving soft tissue extensibility.  (21868)    Service Based Modalities:      Timed Code Treatment Minutes:  44' neuro re-ed        15' Manual e-stim     Total Treatment Minutes:  47'      Treatment/Activity Tolerance:  [x] Patient tolerated treatment well [] Patient limited by fatique  [] Patient limited by pain  [] Patient limited by other medical complications  [] Other:     Prognosis: [x] Good [] Fair  [] Poor    Patient Requires Follow-up: [x] Yes  [] No      Goals:  Short term goals  Time Frame for Short term goals: 2 weeks  Short term goal 1: Initiate HEP MET  Short term goal 2: Increase facial posture/strength for corner of lip with 0.6cm and corner of eyelid within 0.4cm compared to left side for improved facial symmetry MET  Short term goal 3: Increase temporalis and masseter strength to Fair for improved chewing and mastication for meals MET    Long term goals  Time Frame for Long term goals : 4 weeks  Long term goal 1: Indep with HEP  Long term goal 2: Increase facial posture/strength for corner of lip with 0.2cm and corner of eyelid within 0.1cm compared to left side for improved facial symmetry partially met  Long term goal 3:  Increase temporalis and masseter strength to Fair+ for improved chewing and mastication for meals partially met  Long term goal 4: Pt to be able to drink with a straw without loss of liquid for improved methods of consuming liquids for hydration in progress  Long term goal 5: Pt to self-report 90% improvement in facial symmetry and activities involved with eating/drinking per self-report for return to previous level of function in progress    Plan:   [x] Continue per plan of care [] Alter current plan (see comments)  [] Plan of care initiated [] Hold pending MD visit [] Discharge    Plan for Next Session:  Progress as tolerated at 1x per week    Electronically signed by:  Atif Mims DPT

## 2020-12-08 ENCOUNTER — HOSPITAL ENCOUNTER (OUTPATIENT)
Dept: PHYSICAL THERAPY | Age: 65
Setting detail: THERAPIES SERIES
Discharge: HOME | End: 2020-12-08
Payer: MEDICARE

## 2020-12-08 PROCEDURE — 97112 NEUROMUSCULAR REEDUCATION: CPT | Performed by: PHYSICAL THERAPIST

## 2020-12-08 PROCEDURE — 97032 APPL MODALITY 1+ESTIM EA 15: CPT | Performed by: PHYSICAL THERAPIST

## 2020-12-08 NOTE — FLOWSHEET NOTE
Physical Therapy Daily Treatment Note    Date:  2020    Patient Name:  Marylene Olp    :  1955  MRN: [id number]  Restrictions/Precautions:     Medical/Treatment Diagnosis Information:   · Diagnosis: Facial paralysis/Bell's palsy  · Treatment Diagnosis: facial paralysis caused by Bell's palsy  Insurance/Certification information:  PT Insurance Information: [employer] Medicare  Physician Information:  Referring Practitioner: Jamari Kingston CNP  Plan of care signed (Y/N):  Y  Visit# / total visits:  5/10   15 visits total  Pain level: 0/10     Time In: 11:02   Time Out: 11:48    Progress Note: []  Yes  [x]  No  Next due by: Visit #10  Or by 10/11/2020    Subjective:   \"I have been continuing to work on the facial exercises, especially the ones with the tongue depressors, which have been helping. \"    Objective:   Observation: Lacking control on R eyelid and lips for most facial exercises  Continues to lack control and strength of eyebrow raising and furrowing, improved ease with closing R corner of mouth/lips, although still lacking with endurance  Test measurements:    R eyelid is measured at 0.1cm lower than left eyelid (2020)  Improved control of corner of mouth. Able to puff R cheek approximately 40-50% of left cheek, noticeable improvement with this exercise. (2020)  Educated on progressing tongue depressor exercises for increasing tongue and lip strength     Exercises:   Exercise/Equipment Resistance/Repetitions Other comments   Facial exercises:     Frown 2-3\"  20x With emphasis to right    Furrowed brows to Eyebrow Raise 20x Initiated 2020   Nose Wrinkle \"    Closed lips smile \"    Pouting \"    Full smile to pursed lips \" Initiated 2020   Cheek Puff \"    \"Swish\" Air side to side \"    Vocal ex:      Ahh 15x    Mmm \"    P \"    B \"    F & V \"    [address] [place] [address] Rd \"    B, V, Oo, T, Sh, K, Ee, M, F, P, Ch, Ah 5x    Ooo's with furrowed brow and raised brows 15x each  HEP initiated 2020 Tongue depressor ex 15x each Added 10/23/2020        Manual: Ice cube massage 5' Used for excitatory responses in facial muscles   Manual e-stim 10' Pulsed & DC (DC added this date)   [] Provided verbal/tactile cueing for activities related to strengthening, flexibility, endurance, ROM. (23810)  [x] Provided verbal/tactile cueing for activities related to improving balance, coordination, kinesthetic sense, posture, motor skill, proprioception. (87378)    Therapeutic Activities:     [] Therapeutic activities, direct (one-on-one) patient contact (use of dynamic activities to improve functional performance). (13533)    Gait:   [] Provided training and instruction to the patient for ambulation re-education. (54810)    Self-Care/ADL's  [] Self-care/home management training and compensatory training, meal preparation, safety procedures, and instructions in use of assistive technology devices/adaptive equipment, direct one-on-one contact. (32805)    Home Exercise Program:     [] Reviewed/Progressed HEP activities related to strengthening, flexibility, endurance, ROM. (65736)  [x] Reviewed/Progressed HEP activities related to improving balance, coordination, kinesthetic sense, posture, motor skill, proprioception.  (00296)    Manual Treatments:    [] Provided manual therapy to mobilize soft tissue/joints for the purpose of modulating pain, promoting relaxation,  increasing ROM, reducing/eliminating soft tissue swelling/inflammation/restriction, improving soft tissue extensibility.  (70547)    Service Based Modalities:      Timed Code Treatment Minutes:  32' neuro re-ed        15' Manual e-stim     Total Treatment Minutes:  55'      Treatment/Activity Tolerance:  [x] Patient tolerated treatment well [] Patient limited by fatique  [] Patient limited by pain  [] Patient limited by other medical complications  [] Other:     Prognosis: [x] Good [] Fair  [] Poor    Patient Requires Follow-up: [x] Yes  [] No      Goals:  Short term goals  Time Frame for Short term goals: 2 weeks  Short term goal 1: Initiate HEP MET  Short term goal 2: Increase facial posture/strength for corner of lip with 0.6cm and corner of eyelid within 0.4cm compared to left side for improved facial symmetry MET  Short term goal 3: Increase temporalis and masseter strength to Fair for improved chewing and mastication for meals MET    Long term goals  Time Frame for Long term goals : 4 weeks  Long term goal 1: Indep with HEP  Long term goal 2: Increase facial posture/strength for corner of lip with 0.2cm and corner of eyelid within 0.1cm compared to left side for improved facial symmetry partially met  Long term goal 3:  Increase temporalis and masseter strength to Fair+ for improved chewing and mastication for meals partially met  Long term goal 4: Pt to be able to drink with a straw without loss of liquid for improved methods of consuming liquids for hydration in progress  Long term goal 5: Pt to self-report 90% improvement in facial symmetry and activities involved with eating/drinking per self-report for return to previous level of function in progress    Plan:   [x] Continue per plan of care [] Alter current plan (see comments)  [] Plan of care initiated [] Hold pending MD visit [] Discharge    Plan for Next Session:  Progress as tolerated at 1x per week    Electronically signed by:  Zahraa Dc DPT

## 2020-12-15 ENCOUNTER — HOSPITAL ENCOUNTER (OUTPATIENT)
Dept: PHYSICAL THERAPY | Age: 65
Setting detail: THERAPIES SERIES
Discharge: HOME | End: 2020-12-15
Payer: MEDICARE

## 2020-12-15 PROCEDURE — 97032 APPL MODALITY 1+ESTIM EA 15: CPT | Performed by: PHYSICAL THERAPIST

## 2020-12-15 PROCEDURE — 97112 NEUROMUSCULAR REEDUCATION: CPT | Performed by: PHYSICAL THERAPIST

## 2020-12-15 NOTE — FLOWSHEET NOTE
Physical Therapy Daily Treatment Note    Date:  12/15/2020    Patient Name:  Raegan Gee    :  1955  MRN: [id number]  Restrictions/Precautions:     Medical/Treatment Diagnosis Information:   · Diagnosis: Facial paralysis/Bell's palsy  · Treatment Diagnosis: facial paralysis caused by Bell's palsy  Insurance/Certification information:  PT Insurance Information: [employer] Medicare  Physician Information:  Referring Practitioner: Javy Grey CNP  Plan of care signed (Y/N):  Y  Visit# / total visits:  6/10   16 visits total  Pain level: 0/10     Time In: 11:08   Time Out: 11:56    Progress Note: []  Yes  [x]  No  Next due by: Visit #10  Or by 10/11/2020    Subjective:   \"I have been continuing to work on the facial exercises, especially the ones with the tongue depressors, which have been helping. \"    Objective:   Observation: Lacking control on R eyelid and lips for most facial exercises  Continues to lack control and strength of eyebrow raising and furrowing, improved ease with closing R corner of mouth/lips, although still lacking with endurance  Test measurements:    R eyelid is measured at 0.1cm lower than left eyelid (2020)  Improved control of corner of mouth. Able to puff R cheek approximately 40-50% of left cheek, noticeable improvement with this exercise. (2020)  Educated on progressing tongue depressor exercises for increasing tongue and lip strength     Exercises:   Exercise/Equipment Resistance/Repetitions Other comments   Facial exercises:     Frown 2-3\"  20x With emphasis to right    Furrowed brows to Eyebrow Raise 20x Initiated 2020   Nose Wrinkle \"    Closed lips smile \"    Pouting \"    Full smile to pursed lips \" Initiated 2020   Cheek Puff \"    \"Swish\" Air side to side \"    Vocal ex:      Ahh 15x    Mmm \"    P \"    B \"    F & V \"    Shh \"    B, V, Oo, T, Sh, K, Ee, M, F, P, Ch, Ah 5x    Ooo's to Mmm  Ahh to Mmm  Ell to Mmm 15x each  HEP initiated 12/15/2020 Shhh to Mmmm 15x each    Oooo to Elll  Ahh to Elll  Shh to ELll  MMmmm to Elll 15x each    Saliva suck-in     Oooo to [employer] with tongue               Tongue depressor ex 15x each, added tongue canoes around tongue depressor Added 12/15/2020        Manual: Ice cube massage 5' Used for excitatory responses in facial muscles   Manual e-stim 10' Pulsed & DC (DC added this date)   [] Provided verbal/tactile cueing for activities related to strengthening, flexibility, endurance, ROM. (95178)  [x] Provided verbal/tactile cueing for activities related to improving balance, coordination, kinesthetic sense, posture, motor skill, proprioception. (97468)    Therapeutic Activities:     [] Therapeutic activities, direct (one-on-one) patient contact (use of dynamic activities to improve functional performance). (58154)    Gait:   [] Provided training and instruction to the patient for ambulation re-education. (55833)    Self-Care/ADL's  [] Self-care/home management training and compensatory training, meal preparation, safety procedures, and instructions in use of assistive technology devices/adaptive equipment, direct one-on-one contact. (24828)    Home Exercise Program:     [] Reviewed/Progressed HEP activities related to strengthening, flexibility, endurance, ROM. (08848)  [x] Reviewed/Progressed HEP activities related to improving balance, coordination, kinesthetic sense, posture, motor skill, proprioception.  (04606)    Manual Treatments:    [] Provided manual therapy to mobilize soft tissue/joints for the purpose of modulating pain, promoting relaxation,  increasing ROM, reducing/eliminating soft tissue swelling/inflammation/restriction, improving soft tissue extensibility.  (84889)    Service Based Modalities:      Timed Code Treatment Minutes:  35' neuro re-ed        15' Manual e-stim     Total Treatment Minutes:  50'      Treatment/Activity Tolerance: [x] Patient tolerated treatment well [] Patient limited by fatique  [] Patient limited by pain  [] Patient limited by other medical complications  [] Other:     Prognosis: [x] Good [] Fair  [] Poor    Patient Requires Follow-up: [x] Yes  [] No      Goals:  Short term goals  Time Frame for Short term goals: 2 weeks  Short term goal 1: Initiate HEP MET  Short term goal 2: Increase facial posture/strength for corner of lip with 0.6cm and corner of eyelid within 0.4cm compared to left side for improved facial symmetry MET  Short term goal 3: Increase temporalis and masseter strength to Fair for improved chewing and mastication for meals MET    Long term goals  Time Frame for Long term goals : 4 weeks  Long term goal 1: Indep with HEP  Long term goal 2: Increase facial posture/strength for corner of lip with 0.2cm and corner of eyelid within 0.1cm compared to left side for improved facial symmetry partially met  Long term goal 3:  Increase temporalis and masseter strength to Fair+ for improved chewing and mastication for meals partially met  Long term goal 4: Pt to be able to drink with a straw without loss of liquid for improved methods of consuming liquids for hydration in progress  Long term goal 5: Pt to self-report 90% improvement in facial symmetry and activities involved with eating/drinking per self-report for return to previous level of function in progress    Plan:   [x] Continue per plan of care [] Alter current plan (see comments)  [] Plan of care initiated [] Hold pending MD visit [] Discharge    Plan for Next Session:  Progress as tolerated at 1x per week    Electronically signed by:  Trista Dunn DPT

## 2020-12-22 ENCOUNTER — HOSPITAL ENCOUNTER (OUTPATIENT)
Dept: PHYSICAL THERAPY | Age: 65
Setting detail: THERAPIES SERIES
Discharge: HOME | End: 2020-12-22
Payer: MEDICARE

## 2020-12-22 PROCEDURE — 97032 APPL MODALITY 1+ESTIM EA 15: CPT | Performed by: PHYSICAL THERAPIST

## 2020-12-22 PROCEDURE — 97112 NEUROMUSCULAR REEDUCATION: CPT | Performed by: PHYSICAL THERAPIST

## 2020-12-22 NOTE — FLOWSHEET NOTE
Physical Therapy Daily Treatment Note    Date:  2020    Patient Name:  Noman Rodgers    :  1955  MRN: [id number]  Restrictions/Precautions:     Medical/Treatment Diagnosis Information:   · Diagnosis: Facial paralysis/Bell's palsy  · Treatment Diagnosis: facial paralysis caused by Bell's palsy  Insurance/Certification information:  PT Insurance Information: [employer]na Medicare  Physician Information:  Referring Practitioner: Britta Leos CNP  Plan of care signed (Y/N):  Y  Visit# / total visits:  7/10   17 visits total  Pain level: 0/10     Time In: 11:05   Time Out: 11:45    Progress Note: []  Yes  [x]  No  Next due by: Visit #10  Or by 10/11/2020    Subjective:   \"I have been continuing to work on the facial exercises, especially the ones with the tongue depressors, which have been helping. \"    Objective:   Observation: Lacking control on R eyelid and lips for most facial exercises  Continues to lack control and strength of eyebrow raising and furrowing, improved ease with closing R corner of mouth/lips, although still lacking with endurance  Test measurements:    R eyelid is measured at 0.1cm lower than left eyelid (2020)  Improved control of corner of mouth. Able to puff R cheek approximately 40-50% of left cheek, noticeable improvement with this exercise.   (2020)  Educated on progressing tongue depressor exercises for increasing tongue and lip strength     Exercises:   Exercise/Equipment Resistance/Repetitions Other comments   Facial exercises:     Frown 2-3\"  20x With emphasis to right    Furrowed brows to Eyebrow Raise 20x Initiated 2020   Nose Wrinkle \"    Closed lips smile \"    Pouting \"    Full smile to pursed lips \" Initiated 2020   Cheek Puff \"    \"Swish\" Air side to side \"    Vocal ex:     B, V, Oo, T, Sh, K, Ee, M, F, P, Ch, Ah 5x    Ooo's to Mmm  Ahh to Mmm  Ell to Mmm 15x each  HEP initiated 12/15/2020   Shhh to Mmmm 15x each    Oooo to Elll  Ahh to Elll  Shh to ELll MMmmm to Elll 15x each    Saliva suck-in 15x    Oooo to Eeee 15x    Taco rolls with tongue 10x              Tongue depressor ex 15x each, added tongue canoes around tongue depressor Added 12/15/2020        Manual: Ice cube massage 5' Used for excitatory responses in facial muscles   Manual e-stim 10' Pulsed & DC (DC added this date)   [] Provided verbal/tactile cueing for activities related to strengthening, flexibility, endurance, ROM. (80697)  [x] Provided verbal/tactile cueing for activities related to improving balance, coordination, kinesthetic sense, posture, motor skill, proprioception. (01623)    Therapeutic Activities:     [] Therapeutic activities, direct (one-on-one) patient contact (use of dynamic activities to improve functional performance). (93161)    Gait:   [] Provided training and instruction to the patient for ambulation re-education. (24759)    Self-Care/ADL's  [] Self-care/home management training and compensatory training, meal preparation, safety procedures, and instructions in use of assistive technology devices/adaptive equipment, direct one-on-one contact. (38747)    Home Exercise Program:     [] Reviewed/Progressed HEP activities related to strengthening, flexibility, endurance, ROM. (56073)  [x] Reviewed/Progressed HEP activities related to improving balance, coordination, kinesthetic sense, posture, motor skill, proprioception.  (28373)    Manual Treatments:    [] Provided manual therapy to mobilize soft tissue/joints for the purpose of modulating pain, promoting relaxation,  increasing ROM, reducing/eliminating soft tissue swelling/inflammation/restriction, improving soft tissue extensibility.  (09128)    Service Based Modalities:      Timed Code Treatment Minutes:  25' neuro re-ed        15' Manual e-stim     Total Treatment Minutes:  36'      Treatment/Activity Tolerance:  [x] Patient tolerated treatment well [] Patient limited by alok [] Patient limited by pain  [] Patient limited by other medical complications  [] Other:     Prognosis: [x] Good [] Fair  [] Poor    Patient Requires Follow-up: [x] Yes  [] No      Goals:  Short term goals  Time Frame for Short term goals: 2 weeks  Short term goal 1: Initiate HEP MET  Short term goal 2: Increase facial posture/strength for corner of lip with 0.6cm and corner of eyelid within 0.4cm compared to left side for improved facial symmetry MET  Short term goal 3: Increase temporalis and masseter strength to Fair for improved chewing and mastication for meals MET    Long term goals  Time Frame for Long term goals : 4 weeks  Long term goal 1: Indep with HEP  Long term goal 2: Increase facial posture/strength for corner of lip with 0.2cm and corner of eyelid within 0.1cm compared to left side for improved facial symmetry partially met  Long term goal 3:  Increase temporalis and masseter strength to Fair+ for improved chewing and mastication for meals partially met  Long term goal 4: Pt to be able to drink with a straw without loss of liquid for improved methods of consuming liquids for hydration in progress  Long term goal 5: Pt to self-report 90% improvement in facial symmetry and activities involved with eating/drinking per self-report for return to previous level of function in progress    Plan:   [x] Continue per plan of care [] Alter current plan (see comments)  [] Plan of care initiated [] Hold pending MD visit [] Discharge    Plan for Next Session:  Progress as tolerated at 1x per week    Electronically signed by:  Anna Sanchez DPT

## 2020-12-29 ENCOUNTER — HOSPITAL ENCOUNTER (OUTPATIENT)
Dept: PHYSICAL THERAPY | Age: 65
Setting detail: THERAPIES SERIES
Discharge: HOME | End: 2020-12-29
Payer: MEDICARE

## 2020-12-29 PROCEDURE — 97112 NEUROMUSCULAR REEDUCATION: CPT | Performed by: PHYSICAL THERAPIST

## 2020-12-29 PROCEDURE — 97032 APPL MODALITY 1+ESTIM EA 15: CPT | Performed by: PHYSICAL THERAPIST

## 2020-12-29 NOTE — FLOWSHEET NOTE
Physical Therapy Daily Treatment Note    Date:  2020    Patient Name:  Ramonita Christina    :  1955  MRN: [id number]  Restrictions/Precautions:     Medical/Treatment Diagnosis Information:   · Diagnosis: Facial paralysis/Bell's palsy  · Treatment Diagnosis: facial paralysis caused by Bell's palsy  Insurance/Certification information:  PT Insurance Information: A[employer]na Medicare  Physician Information:  Referring Practitioner: Dagmar Hinojosa CNP  Plan of care signed (Y/N):  Y  Visit# / total visits:  8/10   18 visits total  Pain level: 0/10     Time In: 11:03   Time Out: 11:45    Progress Note: []  Yes  [x]  No  Next due by: Visit #10  Or by 10/11/2020    Subjective:   \"I have been continuing to work on the facial exercises, especially the ones with the tongue depressors, which have been helping. \"    Objective:   Observation: Lacking control on R eyelid and lips for most facial exercises  Continues to lack control and strength of eyebrow raising and furrowing, improved ease with closing R corner of mouth/lips, although still lacking with endurance  Test measurements:    R eyelid is measured at 0.1cm lower than left eyelid (2020)  Improved control of corner of mouth. Able to puff R cheek approximately 40-50% of left cheek, noticeable improvement with this exercise.   (2020)  Educated on progressing tongue depressor exercises for increasing tongue and lip strength     Exercises:   Exercise/Equipment Resistance/Repetitions Other comments   Facial exercises:     Frown 2-3\"  20x With emphasis to right    Furrowed brows to Eyebrow Raise 20x Initiated 2020   Nose Wrinkle \"    Closed lips smile \"    Pouting \"    Full smile to pursed lips \" Initiated 2020   Cheek Puff \"    \"Swish\" Air side to side \"    Vocal ex:     B, V, Oo, T, Sh, K, Ee, M, F, P, Ch, Ah 5x    Ooo's to Mmm  Ahh to Mmm  Ell to Mmm 15x each  HEP initiated 12/15/2020    15x each    Oooo to Elll  Ahh to Elll  Shh to ELll MMmmm to Elll 15x each    Saliva suck-in 15x    Oooo to Eeee 15x    Taco rolls with tongue 10x              Tongue depressor ex 15x each, added tongue canoes around tongue depressor Added 12/15/2020        Manual: Ice cube massage 5' Used for excitatory responses in facial muscles   Manual e-stim 10' Pulsed & DC (DC added this date)   [] Provided verbal/tactile cueing for activities related to strengthening, flexibility, endurance, ROM. (63884)  [x] Provided verbal/tactile cueing for activities related to improving balance, coordination, kinesthetic sense, posture, motor skill, proprioception. (72411)    Therapeutic Activities:     [] Therapeutic activities, direct (one-on-one) patient contact (use of dynamic activities to improve functional performance). (60323)    Gait:   [] Provided training and instruction to the patient for ambulation re-education. (64127)    Self-Care/ADL's  [] Self-care/home management training and compensatory training, meal preparation, safety procedures, and instructions in use of assistive technology devices/adaptive equipment, direct one-on-one contact. (50849)    Home Exercise Program:     [] Reviewed/Progressed HEP activities related to strengthening, flexibility, endurance, ROM. (24221)  [x] Reviewed/Progressed HEP activities related to improving balance, coordination, kinesthetic sense, posture, motor skill, proprioception.  (85433)    Manual Treatments:    [] Provided manual therapy to mobilize soft tissue/joints for the purpose of modulating pain, promoting relaxation,  increasing ROM, reducing/eliminating soft tissue swelling/inflammation/restriction, improving soft tissue extensibility.  (63892)    Service Based Modalities:      Timed Code Treatment Minutes:  32' neuro re-ed        15' Manual e-stim     Total Treatment Minutes:  43'      Treatment/Activity Tolerance:  [x] Patient tolerated treatment well [] Patient limited by alok

## 2021-01-05 ENCOUNTER — HOSPITAL ENCOUNTER (OUTPATIENT)
Dept: PHYSICAL THERAPY | Age: 66
Setting detail: THERAPIES SERIES
Discharge: HOME | End: 2021-01-05
Payer: MEDICARE

## 2021-01-05 PROCEDURE — 97112 NEUROMUSCULAR REEDUCATION: CPT | Performed by: PHYSICAL THERAPIST

## 2021-01-05 PROCEDURE — 97032 APPL MODALITY 1+ESTIM EA 15: CPT | Performed by: PHYSICAL THERAPIST

## 2021-01-05 NOTE — FLOWSHEET NOTE
Physical Therapy Daily Treatment Note    Date:  2021    Patient Name:  Yobany Kuo    :  1955  MRN: [id number]  Restrictions/Precautions:     Medical/Treatment Diagnosis Information:   · Diagnosis: Facial paralysis/Bell's palsy  · Treatment Diagnosis: facial paralysis caused by Bell's palsy  Insurance/Certification information:  PT Insurance Information: A[employer]na Medicare  Physician Information:  Referring Practitioner: Ally Desir CNP  Plan of care signed (Y/N):  Y  Visit# / total visits:  9/10   19 visits total  Pain level: 0/10     Time In: 11:10   Time Out: 11:47    Progress Note: []  Yes  [x]  No  Next due by: Visit #10  Or by 10/11/2020    Subjective:   \"I'm still having some issues with saliva forming in my mouth when I am talking, especially if I am talking a lot. \"    Objective:   Observation: Lacking control on R eyelid and lips for most facial exercises  Continues to lack control and strength of eyebrow raising and furrowing, improved ease with closing R corner of mouth/lips, although still lacking with endurance  Test measurements:    R eyelid is measured at 0.0cm lower than left eyelid (2021)  Improved control of corner of mouth.  Able to puff R cheek approximately 50-60% of left cheek, noticeable improvement with this exercise.  (2021)  Educated on progressing tongue depressor exercises for increasing tongue and lip strength     Exercises:   Exercise/Equipment Resistance/Repetitions Other comments   Facial exercises:     Frown With emphasis to right    Furrowed brows to Eyebrow Raise Initiated 2020   Nose Wrinkle    Closed lips smile    Pouting    Full smile to pursed lips Initiated 2020   Cheek Puff 5\" x 10    \"Swish\" Air side to side     Vocal ex:     B, V, Oo, T, Sh, K, Ee, M, F, P, Ch, Ah     Ooo's to Mmm  Ahh to Mmm  Ell to Mmm 15x each  HEP initiated 12/15/2020    15x each    Oooo to Elll  Ahh to Elll  Shh to ELll  MMmmm to Elll 15x each    Saliva suck-in 15x Oooo to [employer] with tongue 15x              Tongue depressor ex 20x each, added tongue canoes around tongue depressor Added 12/15/2020        Manual: Ice cube massage  Used for excitatory responses in facial muscles   Manual e-stim 15' Pulsed & DC (DC added this date) increased intensity this date (5)   [] Provided verbal/tactile cueing for activities related to strengthening, flexibility, endurance, ROM. (17447)  [x] Provided verbal/tactile cueing for activities related to improving balance, coordination, kinesthetic sense, posture, motor skill, proprioception. (93512)    Therapeutic Activities:     [] Therapeutic activities, direct (one-on-one) patient contact (use of dynamic activities to improve functional performance). (83209)    Gait:   [] Provided training and instruction to the patient for ambulation re-education. (90255)    Self-Care/ADL's  [] Self-care/home management training and compensatory training, meal preparation, safety procedures, and instructions in use of assistive technology devices/adaptive equipment, direct one-on-one contact. (52482)    Home Exercise Program:     [] Reviewed/Progressed HEP activities related to strengthening, flexibility, endurance, ROM. (34710)  [x] Reviewed/Progressed HEP activities related to improving balance, coordination, kinesthetic sense, posture, motor skill, proprioception.  (57309)    Manual Treatments:    [] Provided manual therapy to mobilize soft tissue/joints for the purpose of modulating pain, promoting relaxation,  increasing ROM, reducing/eliminating soft tissue swelling/inflammation/restriction, improving soft tissue extensibility.  (27949)    Service Based Modalities:      Timed Code Treatment Minutes:  22' neuro re-ed        15' Manual e-stim     Total Treatment Minutes:  37'      Treatment/Activity Tolerance:  [x] Patient tolerated treatment well [] Patient limited by alok [] Patient limited by pain  [] Patient limited by other medical complications  [] Other:     Prognosis: [x] Good [] Fair  [] Poor    Patient Requires Follow-up: [x] Yes  [] No      Goals:  Short term goals  Time Frame for Short term goals: 2 weeks  Short term goal 1: Initiate HEP MET  Short term goal 2: Increase facial posture/strength for corner of lip with 0.6cm and corner of eyelid within 0.4cm compared to left side for improved facial symmetry MET  Short term goal 3: Increase temporalis and masseter strength to Fair for improved chewing and mastication for meals MET    Long term goals  Time Frame for Long term goals : 4 weeks  Long term goal 1: Indep with HEP  Long term goal 2: Increase facial posture/strength for corner of lip with 0.2cm and corner of eyelid within 0.1cm compared to left side for improved facial symmetry partially met  Long term goal 3:  Increase temporalis and masseter strength to Fair+ for improved chewing and mastication for meals partially met  Long term goal 4: Pt to be able to drink with a straw without loss of liquid for improved methods of consuming liquids for hydration in progress  Long term goal 5: Pt to self-report 90% improvement in facial symmetry and activities involved with eating/drinking per self-report for return to previous level of function in progress    Plan:   [x] Continue per plan of care [] Alter current plan (see comments)  [] Plan of care initiated [] Hold pending MD visit [] Discharge    Plan for Next Session:  Progress as tolerated at 1x per week    Electronically signed by:  Vanessa Moncada DPT

## 2021-01-12 ENCOUNTER — HOSPITAL ENCOUNTER (OUTPATIENT)
Dept: PHYSICAL THERAPY | Age: 66
Setting detail: THERAPIES SERIES
Discharge: HOME | End: 2021-01-12
Payer: MEDICARE

## 2021-01-12 PROCEDURE — 97032 APPL MODALITY 1+ESTIM EA 15: CPT | Performed by: PHYSICAL THERAPIST

## 2021-01-12 PROCEDURE — 97140 MANUAL THERAPY 1/> REGIONS: CPT | Performed by: PHYSICAL THERAPIST

## 2021-01-12 NOTE — FLOWSHEET NOTE
Physical Therapy Daily Treatment Note    Date:  2021    Patient Name:  [facility] Annette    :  1955  MRN: [id number]  Restrictions/Precautions:     Medical/Treatment Diagnosis Information:   · Diagnosis: Facial paralysis/Bell's palsy  · Treatment Diagnosis: facial paralysis caused by Bell's palsy  Insurance/Certification information:  PT Insurance Information: Aetna Medicare  Physician Information:  Referring Practitioner: April Broussard CNP  Plan of care signed (Y/N):  Y  Visit# / total visits:  10/10   20 visits total  Pain level: 0/10     Time In: 11:03   Time Out: 11:34    Progress Note: []  Yes  [x]  No  Next due by: Visit #10  Or by 10/11/2020    Subjective:   \"I'm still doing the exercises at home. Tomorrow is the 1 year anniversary of when I first noticed symptoms. \"    Objective:   Observation: Lacking control on R eyelid and lips for most facial exercises  Continues to lack control and strength of eyebrow raising and furrowing, improved ease with closing R corner of mouth/lips, although still lacking with endurance  Test measurements:    R eyelid is measured at 0.0cm lower than left eyelid (2021)  Improved control of corner of mouth.  Able to puff R cheek approximately 50-60% of left cheek, noticeable improvement with this exercise.  (2021)  Visually improved tone throughout R cheek and lateral facial regions    Exercises:   Exercise/Equipment Resistance/Repetitions Other comments   Facial exercises:     Frown With emphasis to right    Furrowed brows to Eyebrow Raise Initiated 2020   Nose Wrinkle    Closed lips smile    Pouting    Full smile to pursed lips Initiated 2020   Cheek Puff     \"Swish\" Air side to side     Vocal ex:     B, V, Oo, T, Sh, K, Ee, M, F, P, Ch, Ah     Ooo's to Mmm  Ahh to Zohaib Spann to Mmm HEP initiated 12/15/2020       [facility]  Shh to ELll  MMmmm to Elll    Saliva suck-in    Oooo to [employer] with tongue Tongue depressor ex Added 12/15/2020        Manual: IDN 16' Used for excitatory responses in facial muscles   Manual e-stim 15' Pulsed & DC (DC added this date) increased intensity this date (5)   [] Provided verbal/tactile cueing for activities related to strengthening, flexibility, endurance, ROM. (71142)  [x] Provided verbal/tactile cueing for activities related to improving balance, coordination, kinesthetic sense, posture, motor skill, proprioception. (82531)    Therapeutic Activities:     [] Therapeutic activities, direct (one-on-one) patient contact (use of dynamic activities to improve functional performance). (76142)    Gait:   [] Provided training and instruction to the patient for ambulation re-education. (64311)    Self-Care/ADL's  [] Self-care/home management training and compensatory training, meal preparation, safety procedures, and instructions in use of assistive technology devices/adaptive equipment, direct one-on-one contact. (12952)    Home Exercise Program:     [] Reviewed/Progressed HEP activities related to strengthening, flexibility, endurance, ROM. (13465)  [x] Reviewed/Progressed HEP activities related to improving balance, coordination, kinesthetic sense, posture, motor skill, proprioception.  (84672)    Manual Treatments:    [x] Provided manual therapy to mobilize soft tissue/joints for the purpose of modulating pain, promoting relaxation,  increasing ROM, reducing/eliminating soft tissue swelling/inflammation/restriction, improving soft tissue extensibility. (29025)   -IDN performed this date by Beth Murray PT, DPT to decrease pain, trigger points, tone, and spasm as well as increase tissue extensibility. Specific placement and dose can be seen on separately scanned image. Note that extensive time spent with soft/deep tissue mobilization and myofascial release techniques used this date to decrease tone/spasm throughout face.       Service Based Modalities:

## 2021-01-15 ENCOUNTER — HOSPITAL ENCOUNTER (OUTPATIENT)
Dept: PHYSICAL THERAPY | Age: 66
Setting detail: THERAPIES SERIES
Discharge: HOME | End: 2021-01-15
Payer: MEDICARE

## 2021-01-15 PROCEDURE — 97140 MANUAL THERAPY 1/> REGIONS: CPT | Performed by: PHYSICAL THERAPIST

## 2021-01-15 PROCEDURE — 97032 APPL MODALITY 1+ESTIM EA 15: CPT | Performed by: PHYSICAL THERAPIST

## 2021-01-15 NOTE — FLOWSHEET NOTE
Physical Therapy Daily Treatment Note    Date:  1/15/2021    Patient Name:  [facility]    :  1955  MRN: [id number]  Restrictions/Precautions:     Medical/Treatment Diagnosis Information:   · Diagnosis: Facial paralysis/Bell's palsy  · Treatment Diagnosis: facial paralysis caused by Bell's palsy  Insurance/Certification information:  PT Insurance Information: Aetna Medicare  Physician Information:  Referring Practitioner: April Broussard CNP  Plan of care signed (Y/N):  Y  Visit# / total visits:  1/10   21 visits total  Pain level: 0/10     Time In: 11:04   Time Out: 11:31    Progress Note: []  Yes  [x]  No  Next due by: Visit #10  Or by 10/11/2020    Subjective:   \"After the needling session, I didn't notice any major differences, except I can feel more tingling type sensation all over around my eye and cheek. \"    Objective:   Observation: Lacking control on R eyelid and lips for most facial exercises  Continues to lack control and strength of eyebrow raising and furrowing, improved ease with closing R corner of mouth/lips, although still lacking with endurance  Test measurements:    R eyelid is measured at 0.0cm lower than left eyelid (2021)  Improved control of corner of mouth.  Able to puff R cheek approximately 50-60% of left cheek, noticeable improvement with this exercise.  (2021)  Visually improved tone throughout R cheek and lateral facial regions    Exercises:   Exercise/Equipment Resistance/Repetitions Other comments   Facial exercises:     Frown With emphasis to right    Furrowed brows to Eyebrow Raise Initiated 2020   Nose Wrinkle    Closed lips smile    Pouting    Full smile to pursed lips Initiated 2020   Cheek Puff     \"Swish\" Air side to side     Vocal ex:     B, V, Oo, T, Sh, K, Ee, M, F, P, Ch, Ah     Ooo's to Mmm  Ahh to Zohaib Spann to Mmm HEP initiated 12/15/2020       Oooo to Elll  Ah to Elll  Shh to ELll  MMmmm to Elll    Saliva suck-in    Oooo to [employer] with tongue            Tongue depressor ex Added 12/15/2020        Manual: IDN 12' Used for excitatory responses in facial muscles  Increased number of needles this date, without any adverse effects   Manual e-stim 15' Pulsed with increased intensity this date (5)   [] Provided verbal/tactile cueing for activities related to strengthening, flexibility, endurance, ROM. (05639)  [x] Provided verbal/tactile cueing for activities related to improving balance, coordination, kinesthetic sense, posture, motor skill, proprioception. (22956)    Therapeutic Activities:     [] Therapeutic activities, direct (one-on-one) patient contact (use of dynamic activities to improve functional performance). (14036)    Gait:   [] Provided training and instruction to the patient for ambulation re-education. (61403)    Self-Care/ADL's  [] Self-care/home management training and compensatory training, meal preparation, safety procedures, and instructions in use of assistive technology devices/adaptive equipment, direct one-on-one contact. (31294)    Home Exercise Program:     [] Reviewed/Progressed HEP activities related to strengthening, flexibility, endurance, ROM. (21310)  [x] Reviewed/Progressed HEP activities related to improving balance, coordination, kinesthetic sense, posture, motor skill, proprioception.  (54736)    Manual Treatments:    [x] Provided manual therapy to mobilize soft tissue/joints for the purpose of modulating pain, promoting relaxation,  increasing ROM, reducing/eliminating soft tissue swelling/inflammation/restriction, improving soft tissue extensibility. (87701)   -IDN performed this date by Tatianna Florez, PT, DPT to decrease pain, trigger points, tone, and spasm as well as increase tissue extensibility. Specific placement and dose can be seen on separately scanned image. Note that extensive time spent with soft/deep tissue mobilization and myofascial release techniques used this date to decrease tone/spasm throughout face. Service Based Modalities:      Timed Code Treatment Minutes:  12' manual        15' Manual e-stim     Total Treatment Minutes:  27'      Treatment/Activity Tolerance:  [x] Patient tolerated treatment well [] Patient limited by fatique  [] Patient limited by pain  [] Patient limited by other medical complications  [] Other:     Prognosis: [x] Good [] Fair  [] Poor    Patient Requires Follow-up: [x] Yes  [] No      Goals:  Short term goals  Time Frame for Short term goals: 2 weeks  Short term goal 1: Initiate HEP MET  Short term goal 2: Increase facial posture/strength for corner of lip with 0.6cm and corner of eyelid within 0.4cm compared to left side for improved facial symmetry MET  Short term goal 3: Increase temporalis and masseter strength to Fair for improved chewing and mastication for meals MET    Long term goals  Time Frame for Long term goals : 4 weeks  Long term goal 1: Indep with HEP  Long term goal 2: Increase facial posture/strength for corner of lip with 0.2cm and corner of eyelid within 0.1cm compared to left side for improved facial symmetry met  Long term goal 3:  Increase temporalis and masseter strength to Fair+ for improved chewing and mastication for meals met  Long term goal 4: Pt to be able to drink with a straw without loss of liquid for improved methods of consuming liquids for hydration met  Long term goal 5: Pt to self-report 90% improvement in facial symmetry and activities involved with eating/drinking per self-report for return to previous level of function in progress    Plan:   [x] Continue per plan of care [x] Alter current plan (see comments) (added IDN this date)  [] Plan of care initiated [] Hold pending MD visit [] Discharge    Plan for Next Session:  Progress as tolerated at 2x per week Electronically signed by:  Mihaela Pace DPT

## 2021-01-18 ENCOUNTER — HOSPITAL ENCOUNTER (OUTPATIENT)
Dept: PHYSICAL THERAPY | Age: 66
Setting detail: THERAPIES SERIES
Discharge: HOME | End: 2021-01-18
Payer: MEDICARE

## 2021-01-18 PROCEDURE — 97140 MANUAL THERAPY 1/> REGIONS: CPT | Performed by: PHYSICAL THERAPIST

## 2021-01-18 PROCEDURE — 97032 APPL MODALITY 1+ESTIM EA 15: CPT | Performed by: PHYSICAL THERAPIST

## 2021-01-18 NOTE — FLOWSHEET NOTE
Physical Therapy Daily Treatment Note    Date:  2021    Patient Name:  Perla Chen    :  1955  MRN: [id number]  Restrictions/Precautions:     Medical/Treatment Diagnosis Information:   · Diagnosis: Facial paralysis/Bell's palsy  · Treatment Diagnosis: facial paralysis caused by Bell's palsy  Insurance/Certification information:  PT Insurance Information: Aetna Medicare  Physician Information:  Referring Practitioner: Niranjan Ambriz CNP  Plan of care signed (Y/N):  Y  Visit# / total visits:  2/10   22 visits total  Pain level: 0/10     Time In: 12:37   Time Out: 1:00    Progress Note: []  Yes  [x]  No  Next due by: Visit #10  Or by 10/11/2020    Subjective:   \"I've noticed a little more tear production and also more increased warm type feeling around my eye and cheek since the last session. \"    Objective:   Observation: Lacking control on R eyelid and lips for most facial exercises  Continues to lack control and strength of eyebrow raising and furrowing, improved ease with closing R corner of mouth/lips, although still lacking with endurance  Test measurements:    R eyelid is measured at 0.0cm lower than left eyelid (2021)  Improved control of corner of mouth.  Able to puff R cheek approximately 50-60% of left cheek, noticeable improvement with this exercise.  (2021)  Visually improved tone throughout R cheek and lateral facial regions, also with decreased swelling at crest of cheekbone this date (21)    Exercises:   Exercise/Equipment Resistance/Repetitions Other comments   Facial exercises:     Frown With emphasis to right    Furrowed brows to Eyebrow Raise Initiated 2020   Nose Wrinkle    Closed lips smile    Pouting    Full smile to pursed lips Initiated 2020   Cheek Puff     \"Swish\" Air side to side     Vocal ex:     B, V, Oo, T, Sh, K, Ee, M, F, P, Ch, Ah     Ooo's to Mmm  Ahh to [employer] to [facility] HEP initiated 12/15/2020       [facility] MMmmm to [facility]    Saliva suck-in    Oooo to [employer] with tongue            Tongue depressor ex Added 12/15/2020        Manual: IDN 8' Used for excitatory responses in facial muscles  Increased number of needles this date, without any adverse effects   Manual e-stim 15' Pulsed with increased intensity this date (5)   [] Provided verbal/tactile cueing for activities related to strengthening, flexibility, endurance, ROM. (34940)  [x] Provided verbal/tactile cueing for activities related to improving balance, coordination, kinesthetic sense, posture, motor skill, proprioception. (93061)    Therapeutic Activities:     [] Therapeutic activities, direct (one-on-one) patient contact (use of dynamic activities to improve functional performance). (03127)    Gait:   [] Provided training and instruction to the patient for ambulation re-education. (47968)    Self-Care/ADL's  [] Self-care/home management training and compensatory training, meal preparation, safety procedures, and instructions in use of assistive technology devices/adaptive equipment, direct one-on-one contact. (15825)    Home Exercise Program:     [] Reviewed/Progressed HEP activities related to strengthening, flexibility, endurance, ROM. (16232)  [x] Reviewed/Progressed HEP activities related to improving balance, coordination, kinesthetic sense, posture, motor skill, proprioception.  (45410)    Manual Treatments:    [x] Provided manual therapy to mobilize soft tissue/joints for the purpose of modulating pain, promoting relaxation,  increasing ROM, reducing/eliminating soft tissue swelling/inflammation/restriction, improving soft tissue extensibility. (19152)   -IDN performed this date by Paola Carranza PT, DPT to decrease pain, trigger points, tone, and spasm as well as increase tissue extensibility. Specific placement and dose can be seen on separately scanned image. Note that extensive time spent with soft/deep tissue mobilization and myofascial release techniques used this date to decrease tone/spasm throughout face. Service Based Modalities:      Timed Code Treatment Minutes:  8' manual        15' Manual e-stim     Total Treatment Minutes:  23'      Treatment/Activity Tolerance:  [x] Patient tolerated treatment well [] Patient limited by fatique  [] Patient limited by pain  [] Patient limited by other medical complications  [] Other:     Prognosis: [x] Good [] Fair  [] Poor    Patient Requires Follow-up: [x] Yes  [] No      Goals:  Short term goals  Time Frame for Short term goals: 2 weeks  Short term goal 1: Initiate HEP MET  Short term goal 2: Increase facial posture/strength for corner of lip with 0.6cm and corner of eyelid within 0.4cm compared to left side for improved facial symmetry MET  Short term goal 3: Increase temporalis and masseter strength to Fair for improved chewing and mastication for meals MET    Long term goals  Time Frame for Long term goals : 4 weeks  Long term goal 1: Indep with HEP  Long term goal 2: Increase facial posture/strength for corner of lip with 0.2cm and corner of eyelid within 0.1cm compared to left side for improved facial symmetry met  Long term goal 3:  Increase temporalis and masseter strength to Fair+ for improved chewing and mastication for meals met  Long term goal 4: Pt to be able to drink with a straw without loss of liquid for improved methods of consuming liquids for hydration met  Long term goal 5: Pt to self-report 90% improvement in facial symmetry and activities involved with eating/drinking per self-report for return to previous level of function in progress    Plan:   [x] Continue per plan of care [x] Alter current plan (see comments) (added IDN this date)  [] Plan of care initiated [] Hold pending MD visit [] Discharge    Plan for Next Session:  Progress as tolerated at 2x per week Electronically signed by:  Mihaela Pace DPT

## 2021-01-19 ENCOUNTER — APPOINTMENT (OUTPATIENT)
Dept: PHYSICAL THERAPY | Age: 66
End: 2021-01-19
Payer: MEDICARE

## 2021-01-22 ENCOUNTER — HOSPITAL ENCOUNTER (OUTPATIENT)
Dept: PHYSICAL THERAPY | Age: 66
Setting detail: THERAPIES SERIES
Discharge: HOME | End: 2021-01-22
Payer: MEDICARE

## 2021-01-22 PROCEDURE — 97032 APPL MODALITY 1+ESTIM EA 15: CPT | Performed by: PHYSICAL THERAPIST

## 2021-01-22 PROCEDURE — 97140 MANUAL THERAPY 1/> REGIONS: CPT | Performed by: PHYSICAL THERAPIST

## 2021-01-22 NOTE — FLOWSHEET NOTE
Ooo's to Mmm  Ahh to Mmm  Ell to Mmm HEP initiated 12/15/2020       Oooo to Elll  Ahh to Elll  Shh to ELll  MMmmm to Elll    Saliva suck-in    Oooo to [employer] with tongue            Tongue depressor ex Added 12/15/2020        Manual: IDN 8' Used for excitatory responses in facial muscles  Increased number of needles this date, without any adverse effects   Manual e-stim 15' Pulsed with intensity level 4.5 with emphasis on masseter and temporalis this date   [] Provided verbal/tactile cueing for activities related to strengthening, flexibility, endurance, ROM. (02824)  [x] Provided verbal/tactile cueing for activities related to improving balance, coordination, kinesthetic sense, posture, motor skill, proprioception. (12546)    Therapeutic Activities:     [] Therapeutic activities, direct (one-on-one) patient contact (use of dynamic activities to improve functional performance). (68583)    Gait:   [] Provided training and instruction to the patient for ambulation re-education. (47542)    Self-Care/ADL's  [] Self-care/home management training and compensatory training, meal preparation, safety procedures, and instructions in use of assistive technology devices/adaptive equipment, direct one-on-one contact. (24056)    Home Exercise Program:     [] Reviewed/Progressed HEP activities related to strengthening, flexibility, endurance, ROM. (35428)  [x] Reviewed/Progressed HEP activities related to improving balance, coordination, kinesthetic sense, posture, motor skill, proprioception.  (50273)    Manual Treatments:    [x] Provided manual therapy to mobilize soft tissue/joints for the purpose of modulating pain, promoting relaxation,  increasing ROM, reducing/eliminating soft tissue swelling/inflammation/restriction, improving soft tissue extensibility.  (81872) -IDN performed this date by Bailee Moreira, PT, DPT to decrease pain, trigger points, tone, and spasm as well as increase tissue extensibility. Specific placement and dose can be seen on separately scanned image. Note that extensive time spent with soft/deep tissue mobilization and myofascial release techniques used this date to decrease tone/spasm throughout face. Service Based Modalities:      Timed Code Treatment Minutes:  10' manual        12' Manual e-stim     Total Treatment Minutes:  26'      Treatment/Activity Tolerance:  [x] Patient tolerated treatment well [] Patient limited by fatique  [] Patient limited by pain  [] Patient limited by other medical complications  [] Other:     Prognosis: [x] Good [] Fair  [] Poor    Patient Requires Follow-up: [x] Yes  [] No      Goals:  Short term goals  Time Frame for Short term goals: 2 weeks  Short term goal 1: Initiate HEP MET  Short term goal 2: Increase facial posture/strength for corner of lip with 0.6cm and corner of eyelid within 0.4cm compared to left side for improved facial symmetry MET  Short term goal 3: Increase temporalis and masseter strength to Fair for improved chewing and mastication for meals MET    Long term goals  Time Frame for Long term goals : 4 weeks  Long term goal 1: Indep with HEP MET  Long term goal 2: Increase facial posture/strength for corner of lip with 0.2cm and corner of eyelid within 0.1cm compared to left side for improved facial symmetry met  Long term goal 3:  Increase temporalis and masseter strength to Fair+ for improved chewing and mastication for meals met  Long term goal 4: Pt to be able to drink with a straw without loss of liquid for improved methods of consuming liquids for hydration met  Long term goal 5: Pt to self-report 90% improvement in facial symmetry and activities involved with eating/drinking per self-report for return to previous level of function in progress    Plan: [x] Continue per plan of care [] Alter current plan (see comments)   [] Plan of care initiated [] Hold pending MD visit [] Discharge    Plan for Next Session:  Progress as tolerated at 2x per week     Electronically signed by:  Lam Wang DPT

## 2021-01-22 NOTE — PLAN OF CARE
[facility]    [x] [place]  Phone: [phone]  Fax: [phone]      [] [place]  Phone: [phone]  Fax: [phone]    Physical Therapy Progress Note  Date: 2021        Patient Name:  Raegan Gee    :  1955  MRN: [id number]  Restrictions/Precautions:     Medical/Treatment Diagnosis Information:   · Diagnosis: Facial paralysis/Bell's palsy  · Treatment Diagnosis: facial paralysis caused by Bell's palsy  Insurance/Certification information:  PT Insurance Information: [employer]out Medicare  Physician Information:  Referring Practitioner: Javy Grey CNP  Plan of care signed (Y/N):  Y  Visit# / total visits:  3/10   13 visits total  Pain level:      010      Time Period for Report: 2020 - 2020  Cancels/No-shows to date:  0    Plan of Care/Treatment to date:  [] Therapeutic Exercise    [x] Modalities:  [] Therapeutic Activity     [] Ultrasound  [x] Electrical Stimulation/Manual  [] Gait Training      [] Cervical Traction    [] Lumbar Traction  [x] Neuromuscular Re-education  [] Cold/hotpack [] Iontophoresis  [x] Instruction in HEP      Other:  [x] Manual Therapy       []    [] Aquatic Therapy       []                    ? Subjective:   \"I've noticed new tingling sensations along the R cheek and upper lip the past week. Sometimes the lip quivers or twitches, and there's not really any warning when either of those will happen. \"     Objective:   · Observation: Lacking control on R eyelid and lips for most facial exercises  · Continues to lack control and strength of eyebrow raising and furrowing, improved ease with closing R corner of mouth/lips, although still lacking with endurance  · Test measurements:    · R eyelid is measured at 0.2cm lower than left eyelid (10/23/2020)  · Improved control of corner of mouth. Able to puff R cheek approximately 20-30% of left cheek, noticeable improvement with this exercise. · Educated on progressing tongue depressor exercises for increasing tongue and lip strength        Assessment:   Continues to have asymmetry of the face (although starting to improve) and surrounding tissues. Continues to have difficulty with increased saliva production, although has improved in ability to swallow food    Plan:    Continue per POC       Goals:    Short term goals  Time Frame for Short term goals: 2 weeks  Short term goal 1: Initiate HEP MET  Short term goal 2: Increase facial posture/strength for corner of lip with 0.6cm and corner of eyelid within 0.4cm compared to left side for improved facial symmetry MET  Short term goal 3: Increase temporalis and masseter strength to Fair for improved chewing and mastication for meals MET     Long term goals  Time Frame for Long term goals : 4 weeks  Long term goal 1: Indep with HEP  Long term goal 2: Increase facial posture/strength for corner of lip with 0.2cm and corner of eyelid within 0.1cm compared to left side for improved facial symmetry partially met  Long term goal 3: Increase temporalis and masseter strength to Fair+ for improved chewing and mastication for meals partially met  Long term goal 4: Pt to be able to drink with a straw without loss of liquid for improved methods of consuming liquids for hydration in progress  Long term goal 5: Pt to self-report 90% improvement in facial symmetry and activities involved with eating/drinking per self-report for return to previous level of function in progress         Current Frequency/Duration: 11/24/2020 -  1/5/2021  # Days per week: [] 1 day # Weeks: [] 1 week [] 4 weeks      [x] 2 days?    [] 2 weeks [] 5 weeks      [] 3 days   [] 3 weeks [x] 6 weeks     Rehab Potential: [] Excellent [x] Good [] Fair  [] Poor     Goal Status:  [] Achieved [x] Partially Achieved / in progress   [] Not Achieved     Patient Status: [] Continue per initial plan of Care     [] Patient now discharged [x] Additional visits requested, Please re-certify for additional visits:      Requested frequency/duration:  2X/week for 4 weeks    Electronically signed by:  Christa Aquino PT    If you have any questions or concerns, please don't hesitate to call.   Thank you for your referral.    Physician Signature:________________________________Date:__________________  By signing above, therapists plan is approved by physician

## 2021-01-22 NOTE — PLAN OF CARE
· R eyelid is measured at 0.3cm lower than left eyelid (10/9/2020)  · Improved tongue poke exercise  · Educated on adding emotion to facial exercises to help regulate control, coordination and proper size and scale of facial expression. Assessment:   Continues to have asymmetry of the face and surrounding tissues. Continues to have difficulty with increased saliva production, although has improved in ability to swallow food    Plan:    Continue per POC       Goals:    Short term goals  Time Frame for Short term goals: 2 weeks  Short term goal 1: Initiate HEP MET  Short term goal 2: Increase facial posture/strength for corner of lip with 0.6cm and corner of eyelid within 0.4cm compared to left side for improved facial symmetry MET  Short term goal 3: Increase temporalis and masseter strength to Fair for improved chewing and mastication for meals     Long term goals  Time Frame for Long term goals : 4 weeks  Long term goal 1: Indep with HEP  Long term goal 2: Increase facial posture/strength for corner of lip with 0.2cm and corner of eyelid within 0.1cm compared to left side for improved facial symmetry  Long term goal 3: Increase temporalis and masseter strength to Fair+ for improved chewing and mastication for meals  Long term goal 4: Pt to be able to drink with a straw without loss of liquid for improved methods of consuming liquids for hydration  Long term goal 5: Pt to self-report 90% improvement in facial symmetry and activities involved with eating/drinking per self-report for return to previous level of function         Current Frequency/Duration: 10/9/2020 -  11/20/2020  # Days per week: [] 1 day # Weeks: [] 1 week [] 4 weeks      [x] 2 days?    [] 2 weeks [] 5 weeks      [] 3 days   [] 3 weeks [x] 6 weeks     Rehab Potential: [] Excellent [x] Good [] Fair  [] Poor     Goal Status:  [] Achieved [x] Partially Achieved / in progress   [] Not Achieved Patient Status: [] Continue per initial plan of Care     [] Patient now discharged     [x] Additional visits requested, Please re-certify for additional visits:      Requested frequency/duration:  2X/week for 4 weeks    Electronically signed by:  Jad Lara PT    If you have any questions or concerns, please don't hesitate to call.   Thank you for your referral.    Physician Signature:________________________________Date:__________________  By signing above, therapists plan is approved by physician

## 2021-01-26 ENCOUNTER — HOSPITAL ENCOUNTER (OUTPATIENT)
Dept: PHYSICAL THERAPY | Age: 66
Setting detail: THERAPIES SERIES
Discharge: HOME | End: 2021-01-26
Payer: MEDICARE

## 2021-01-26 PROCEDURE — 97032 APPL MODALITY 1+ESTIM EA 15: CPT | Performed by: PHYSICAL THERAPIST

## 2021-01-26 PROCEDURE — 97140 MANUAL THERAPY 1/> REGIONS: CPT | Performed by: PHYSICAL THERAPIST

## 2021-01-26 NOTE — FLOWSHEET NOTE
Physical Therapy Daily Treatment Note    Date:  2021    Patient Name:  Marnie Walls    :  1955  MRN: [id number]  Restrictions/Precautions:     Medical/Treatment Diagnosis Information:   · Diagnosis: Facial paralysis/Bell's palsy  · Treatment Diagnosis: facial paralysis caused by Bell's palsy  Insurance/Certification information:  PT Insurance Information: [employer]  Physician Information:  Referring Practitioner: Jon Dunham CNP  Plan of care signed (Y/N):  Y  Visit# / total visits:  4/10   24 visits total  Pain level: 0/10     Time In: 11:07   Time Out: 11:38    Progress Note: []  Yes  [x]  No  Next due by: Visit #10  Or by 10/11/2020    Subjective:   \"I continue to feel increased sensations of warmth around the areas that have been dry needled and stimulated. I do feel that maybe we're slowing down on the overall progress for my facial symmetry and that we may need to be finished with therapy soon. I plan on talking to my doctor about possibly trying Botox injections for my cheek and eye, as the cheek still salivates more than I would like and the eyelid still drops just a little, causing some visual issues from time to time. \"    Objective:   Observation: Continues to lack control and strength of eyebrow raising and furrowing, improved ease with closing R corner of mouth/lips, although still lacking with endurance (2021)    Test measurements:    R eyelid is measured at 0.0cm lower than left eyelid (2021)  Improved control of corner of mouth.  Able to puff R cheek approximately 50-60% of left cheek, noticeable improvement with this exercise.  (2021)  Visually improved tone throughout R cheek and lateral facial regions, also with decreased swelling at crest of cheekbone this date (21)    Exercises:   Exercise/Equipment Resistance/Repetitions Other comments   Facial exercises:     Frown With emphasis to right    Furrowed brows to Eyebrow Raise Initiated 2020 Nose Wrinkle    Closed lips smile    Pouting    Full smile to pursed lips Initiated 11/6/2020   Cheek Puff     \"Swish\" Air side to side     Vocal ex:     B, V, Oo, T, Sh, K, Ee, M, F, P, Ch, Ah     Ooo's to Mmm  Ahh to Mmm  Ell to Mmm HEP initiated 12/15/2020       Oooo to Elll  Ahh to Elll  Shh to ELll  MMmmm to Elll    Saliva suck-in    Oooo to [employer] with tongue            Tongue depressor ex Added 12/15/2020        Manual: IDN 15' Used for excitatory responses in facial muscles  Increased number of needles this date, without any adverse effects   Manual e-stim 16' Pulsed with intensity level 4.5 with emphasis on masseter and temporalis this date   [] Provided verbal/tactile cueing for activities related to strengthening, flexibility, endurance, ROM. (32298)  [x] Provided verbal/tactile cueing for activities related to improving balance, coordination, kinesthetic sense, posture, motor skill, proprioception. (11047)    Therapeutic Activities:     [] Therapeutic activities, direct (one-on-one) patient contact (use of dynamic activities to improve functional performance). (32463)    Gait:   [] Provided training and instruction to the patient for ambulation re-education. (47946)    Self-Care/ADL's  [] Self-care/home management training and compensatory training, meal preparation, safety procedures, and instructions in use of assistive technology devices/adaptive equipment, direct one-on-one contact.  (70449)    Home Exercise Program:     [] Reviewed/Progressed HEP activities related to strengthening, flexibility, endurance, ROM. (07567)  [x] Reviewed/Progressed HEP activities related to improving balance, coordination, kinesthetic sense, posture, motor skill, proprioception.  (32584)    Manual Treatments: [x] Provided manual therapy to mobilize soft tissue/joints for the purpose of modulating pain, promoting relaxation,  increasing ROM, reducing/eliminating soft tissue swelling/inflammation/restriction, improving soft tissue extensibility. (21640)   -IDN performed this date by Olimpia Jones, PT, DPT to decrease pain, trigger points, tone, and spasm as well as increase tissue extensibility. Specific placement and dose can be seen on separately scanned image. Note that extensive time spent with soft/deep tissue mobilization and myofascial release techniques used this date to decrease tone/spasm throughout face. Service Based Modalities:      Timed Code Treatment Minutes:  15' manual        12' Manual e-stim     Total Treatment Minutes:  32'      Treatment/Activity Tolerance:  [x] Patient tolerated treatment well [] Patient limited by fatique  [] Patient limited by pain  [] Patient limited by other medical complications  [] Other:     Prognosis: [x] Good [] Fair  [] Poor    Patient Requires Follow-up: [x] Yes  [] No      Goals:  Short term goals  Time Frame for Short term goals: 2 weeks  Short term goal 1: Initiate HEP MET  Short term goal 2: Increase facial posture/strength for corner of lip with 0.6cm and corner of eyelid within 0.4cm compared to left side for improved facial symmetry MET  Short term goal 3: Increase temporalis and masseter strength to Fair for improved chewing and mastication for meals MET    Long term goals  Time Frame for Long term goals : 4 weeks  Long term goal 1: Indep with HEP MET  Long term goal 2: Increase facial posture/strength for corner of lip with 0.2cm and corner of eyelid within 0.1cm compared to left side for improved facial symmetry met  Long term goal 3:  Increase temporalis and masseter strength to Fair+ for improved chewing and mastication for meals met Long term goal 4: Pt to be able to drink with a straw without loss of liquid for improved methods of consuming liquids for hydration met  Long term goal 5: Pt to self-report 90% improvement in facial symmetry and activities involved with eating/drinking per self-report for return to previous level of function in progress    Plan:   [x] Continue per plan of care [] Alter current plan (see comments)   [] Plan of care initiated [] Hold pending MD visit [] Discharge    Plan for Next Session:  Progress as tolerated at 2x per week     Electronically signed by:  Keyla Izquierdo DPT

## 2021-01-29 ENCOUNTER — HOSPITAL ENCOUNTER (OUTPATIENT)
Dept: PHYSICAL THERAPY | Age: 66
Setting detail: THERAPIES SERIES
Discharge: HOME | End: 2021-01-29
Payer: MEDICARE

## 2021-01-29 PROCEDURE — 97140 MANUAL THERAPY 1/> REGIONS: CPT | Performed by: PHYSICAL THERAPIST

## 2021-01-29 PROCEDURE — 97032 APPL MODALITY 1+ESTIM EA 15: CPT | Performed by: PHYSICAL THERAPIST

## 2021-01-29 NOTE — DISCHARGE SUMMARY
[facility]    [x] [place]  Phone: [phone]  Fax: [phone]      [] [place]  Phone: [phone]  Fax: [phone]    Physical Therapy Discharge Note  Date: 2021        Patient Name:  Channing Jaquez    :  1955  MRN: [id number]  Medical/Treatment Diagnosis Information:   · Diagnosis: Facial paralysis/Bell's palsy  · Treatment Diagnosis: facial paralysis caused by Bell's palsy  Insurance/Certification information:  PT Insurance Information: [employer] Schimke Medicare  Physician Information:  Referring Practitioner: Mindi Lopez CNP  Plan of care signed (Y/N):  Y  Visit# / total visits:  5/10   25 visits total  Pain level:      010        Time Period for Report: 2020 - 2021  Cancels/No-shows to date:  0    Plan of Care/Treatment to date:  [x] Therapeutic Exercise    [] Modalities:  [] Therapeutic Activity     [] Ultrasound  [x] Electrical Stimulation  [] Gait Training      [] Cervical Traction    [] Lumbar Traction  [x] Neuromuscular Re-education  [] Cold/hotpack [] Iontophoresis  [x] Instruction in HEP      Other:  [x] Manual Therapy       []    [] Aquatic Therapy       []                              Subjective:   \"I noticed a big improvement in my smile in the last couple days. I also noticed less saliva build up when I am speaking. I think I am ready to be finished today though and can continue with a few things on my own at home\"     Objective:  · Test measurements:    · R eyelid is measured at 0.0cm lower than left eyelid (2021)  · Improved control of corner of mouth.  Able to puff R cheek approximately 80-90% of left cheek, noticeable improvement with this exercise.  (2021)  · Visually improved tone throughout R cheek and lateral facial regions, also with decreased swelling at crest of cheekbone this date (21) Improved muscular strength/control with raising eyebrows, as seen by increased height of R eyebrow when raising      Assessment:   Wendy Prasad has met goals for facial symmetry, strength, control, and ease for ADLs. Plan:    D/C to HEP due 100% of goals met this date. Goals:   Short term goals  Time Frame for Short term goals: 2 weeks  Short term goal 1: Initiate HEP MET  Short term goal 2: Increase facial posture/strength for corner of lip with 0.6cm and corner of eyelid within 0.4cm compared to left side for improved facial symmetry MET  Short term goal 3: Increase temporalis and masseter strength to Fair for improved chewing and mastication for meals MET     Long term goals  Time Frame for Long term goals : 4 weeks  Long term goal 1: Indep with HEP MET  Long term goal 2: Increase facial posture/strength for corner of lip with 0.2cm and corner of eyelid within 0.1cm compared to left side for improved facial symmetry met  Long term goal 3:  Increase temporalis and masseter strength to Fair+ for improved chewing and mastication for meals met  Long term goal 4: Pt to be able to drink with a straw without loss of liquid for improved methods of consuming liquids for hydration met  Long term goal 5: Pt to self-report 90% improvement in facial symmetry and activities involved with eating/drinking per self-report for return to previous level of function MET         Percentage of Goals Met:    100%          Discharge Prognosis: [x] Excellent [] Good [] Fair  [] Poor     Goal Status:  [x] Achieved [] Partially Achieved  [] Not Achieved       Electronically signed by:  Neida Jasmine, PT, DPT

## 2021-01-29 NOTE — FLOWSHEET NOTE
Physical Therapy Daily Treatment Note    Date:  2021    Patient Name:  Bharati Araya    :  1955  MRN: [id number]  Restrictions/Precautions:     Medical/Treatment Diagnosis Information:   · Diagnosis: Facial paralysis/Bell's palsy  · Treatment Diagnosis: facial paralysis caused by Bell's palsy  Insurance/Certification information:  PT Insurance Information: [employer]  Physician Information:  Referring Practitioner: Marylu Vo CNP  Plan of care signed (Y/N):  Y  Visit# / total visits:  5/10   25 visits total  Pain level: 0/10     Time In: 11:02   Time Out: 11:28    Progress Note: []  Yes  [x]  No  Next due by: Visit #10  Or by 10/11/2020    Subjective:   \"I noticed a big improvement in my smile in the last couple days. I also noticed less saliva build up when I am speaking. I think I am ready to be finished today though and can continue with a few things on my own at home\"    Objective:   Observation:     Test measurements:    R eyelid is measured at 0.0cm lower than left eyelid (2021)  Improved control of corner of mouth.  Able to puff R cheek approximately 80-90% of left cheek, noticeable improvement with this exercise.  (2021)  Visually improved tone throughout R cheek and lateral facial regions, also with decreased swelling at crest of cheekbone this date (21)  Improved muscular strength/control with raising eyebrows, as seen by increased height of R eyebrow when raising     Exercises:   Exercise/Equipment Resistance/Repetitions Other comments   Facial exercises:     Frown With emphasis to right    Furrowed brows to Eyebrow Raise Initiated 2020   Nose Wrinkle    Closed lips smile    Pouting    Full smile to pursed lips Initiated 2020   Cheek Puff     \"Swish\" Air side to side     Vocal ex:     B, V, Oo, T, Sh, K, Ee, M, F, P, Ch, Ah     Ooo's to Mmm  Ahh to [employer] to Mmm HEP initiated 12/15/2020       Oooo to Elll  Ahh to Elll  Shh to ELll  MMmmm to Elll Saliva suck-in    Oooo to [employer] with tongue            Tongue depressor ex Added 12/15/2020        Manual: IDN 13' Used for excitatory responses in facial muscles  Increased number of needles this date, without any adverse effects   Manual e-stim 13' Pulsed with intensity level 4.5 with emphasis on masseter and temporalis this date   [] Provided verbal/tactile cueing for activities related to strengthening, flexibility, endurance, ROM. (98177)  [x] Provided verbal/tactile cueing for activities related to improving balance, coordination, kinesthetic sense, posture, motor skill, proprioception. (73553)    Therapeutic Activities:     [] Therapeutic activities, direct (one-on-one) patient contact (use of dynamic activities to improve functional performance). (98514)    Gait:   [] Provided training and instruction to the patient for ambulation re-education. (93453)    Self-Care/ADL's  [] Self-care/home management training and compensatory training, meal preparation, safety procedures, and instructions in use of assistive technology devices/adaptive equipment, direct one-on-one contact. (63039)    Home Exercise Program:     [] Reviewed/Progressed HEP activities related to strengthening, flexibility, endurance, ROM. (70276)  [x] Reviewed/Progressed HEP activities related to improving balance, coordination, kinesthetic sense, posture, motor skill, proprioception.  (60423)    Manual Treatments:    [x] Provided manual therapy to mobilize soft tissue/joints for the purpose of modulating pain, promoting relaxation,  increasing ROM, reducing/eliminating soft tissue swelling/inflammation/restriction, improving soft tissue extensibility. (99348)   -IDN performed this date by Emilie Bower, PT, DPT to decrease pain, trigger points, tone, and spasm as well as increase tissue extensibility. Specific placement and dose can be seen on separately scanned image. Note that extensive time spent with soft/deep tissue mobilization and myofascial release techniques used this date to decrease tone/spasm throughout face. Service Based Modalities:      Timed Code Treatment Minutes:  15' manual        13' Manual e-stim     Total Treatment Minutes:  26'      Treatment/Activity Tolerance:  [x] Patient tolerated treatment well [] Patient limited by fatique  [] Patient limited by pain  [] Patient limited by other medical complications  [] Other:     Prognosis: [x] Good [] Fair  [] Poor    Patient Requires Follow-up: [x] Yes  [] No      Goals:  Short term goals  Time Frame for Short term goals: 2 weeks  Short term goal 1: Initiate HEP MET  Short term goal 2: Increase facial posture/strength for corner of lip with 0.6cm and corner of eyelid within 0.4cm compared to left side for improved facial symmetry MET  Short term goal 3: Increase temporalis and masseter strength to Fair for improved chewing and mastication for meals MET    Long term goals  Time Frame for Long term goals : 4 weeks  Long term goal 1: Indep with HEP MET  Long term goal 2: Increase facial posture/strength for corner of lip with 0.2cm and corner of eyelid within 0.1cm compared to left side for improved facial symmetry met  Long term goal 3:  Increase temporalis and masseter strength to Fair+ for improved chewing and mastication for meals met  Long term goal 4: Pt to be able to drink with a straw without loss of liquid for improved methods of consuming liquids for hydration met  Long term goal 5: Pt to self-report 90% improvement in facial symmetry and activities involved with eating/drinking per self-report for return to previous level of function MET    Plan:   [] Continue per plan of care [] Alter current plan (see comments)   [] Plan of care initiated [] Hold pending MD visit [x] Discharge    Plan for Next Session:  Discharge to HEP     Electronically signed by:  Iván Bradley DPT

## 2021-02-02 ENCOUNTER — HOSPITAL ENCOUNTER (OUTPATIENT)
Dept: LAB | Age: 66
Discharge: HOME | End: 2021-02-02
Payer: MEDICARE

## 2021-02-02 ENCOUNTER — PATIENT MESSAGE (OUTPATIENT)
Dept: INTERNAL MEDICINE | Age: 66
End: 2021-02-02

## 2021-02-02 DIAGNOSIS — E03.9 HYPOTHYROIDISM, UNSPECIFIED TYPE: ICD-10-CM

## 2021-02-02 DIAGNOSIS — E55.9 VITAMIN D DEFICIENCY: ICD-10-CM

## 2021-02-02 DIAGNOSIS — E78.5 DYSLIPIDEMIA: ICD-10-CM

## 2021-02-02 LAB
ALBUMIN SERPL-MCNC: 3.9 G/DL (ref 3.5–5.2)
ALBUMIN/GLOBULIN RATIO: 1.3 (ref 1–2.5)
ALP BLD-CCNC: 83 U/L (ref 35–104)
ALT SERPL-CCNC: 18 U/L (ref 5–33)
ANION GAP SERPL CALCULATED.3IONS-SCNC: 8 MMOL/L (ref 9–17)
AST SERPL-CCNC: 23 U/L
BILIRUB SERPL-MCNC: 0.71 MG/DL (ref 0.3–1.2)
BUN BLDV-MCNC: 17 MG/DL (ref 8–23)
BUN/CREAT BLD: 25 (ref 9–20)
CALCIUM SERPL-MCNC: 9.6 MG/DL (ref 8.6–10.4)
CHLORIDE BLD-SCNC: 105 MMOL/L (ref 98–107)
CHOLESTEROL/HDL RATIO: 2.2
CHOLESTEROL: 160 MG/DL
CO2: 28 MMOL/L (ref 20–31)
CREAT SERPL-MCNC: 0.68 MG/DL (ref 0.5–0.9)
GFR AFRICAN AMERICAN: >60 ML/MIN
GFR NON-AFRICAN AMERICAN: >60 ML/MIN
GFR SERPL CREATININE-BSD FRML MDRD: ABNORMAL ML/MIN/{1.73_M2}
GFR SERPL CREATININE-BSD FRML MDRD: ABNORMAL ML/MIN/{1.73_M2}
GLUCOSE BLD-MCNC: 107 MG/DL (ref 70–99)
HDLC SERPL-MCNC: 72 MG/DL
LDL CHOLESTEROL: 73 MG/DL (ref 0–130)
POTASSIUM SERPL-SCNC: 4.1 MMOL/L (ref 3.7–5.3)
SODIUM BLD-SCNC: 141 MMOL/L (ref 135–144)
THYROXINE, FREE: 1.15 NG/DL (ref 0.93–1.7)
TOTAL PROTEIN: 6.8 G/DL (ref 6.4–8.3)
TRIGL SERPL-MCNC: 77 MG/DL
TSH SERPL DL<=0.05 MIU/L-ACNC: 6.58 MIU/L (ref 0.3–5)
VITAMIN D 25-HYDROXY: 38.6 NG/ML (ref 30–100)
VLDLC SERPL CALC-MCNC: NORMAL MG/DL (ref 1–30)

## 2021-02-02 PROCEDURE — 36415 COLL VENOUS BLD VENIPUNCTURE: CPT

## 2021-02-02 PROCEDURE — 84443 ASSAY THYROID STIM HORMONE: CPT

## 2021-02-02 PROCEDURE — 82306 VITAMIN D 25 HYDROXY: CPT

## 2021-02-02 PROCEDURE — 80053 COMPREHEN METABOLIC PANEL: CPT

## 2021-02-02 PROCEDURE — 80061 LIPID PANEL: CPT

## 2021-02-02 PROCEDURE — 84439 ASSAY OF FREE THYROXINE: CPT

## 2021-02-02 NOTE — TELEPHONE ENCOUNTER
From: Qian Calvillo  To: SCOTT Campa - CNP  Sent: 2/2/2021 11:50 AM EST  Subject: Prescription Question    I have a question about COMPREHENSIVE METABOLIC PANEL resulted on 2/2/21, 9:16 AM.    Telma Molina. ..wondering if I should continue taking the Rosuvastatin 5 mg?

## 2021-02-03 ENCOUNTER — PATIENT MESSAGE (OUTPATIENT)
Dept: INTERNAL MEDICINE | Age: 66
End: 2021-02-03

## 2021-02-03 NOTE — TELEPHONE ENCOUNTER
From: BRISEIDA FRANCISCO  To: Qian Calvillo  Sent: 2/3/2021 9:33 AM EST  Subject: Cholesterol results    Linus Aguillon received your cholesterol results this morning. It \"shows considerable improvement since starting Crestor. \" So continue taking Crestor at the current dose. She will review all results with you at your next appointment. Feel free to contact us if you have any additional questions.  Thanks, Kj Acosta (nurse)

## 2021-02-22 ENCOUNTER — IMMUNIZATION (OUTPATIENT)
Dept: LAB | Age: 66
End: 2021-02-22
Payer: MEDICARE

## 2021-02-22 PROCEDURE — 91301 COVID-19, MODERNA VACCINE 100MCG/0.5ML DOSE: CPT

## 2021-03-11 ENCOUNTER — OFFICE VISIT (OUTPATIENT)
Dept: INTERNAL MEDICINE | Age: 66
End: 2021-03-11
Payer: MEDICARE

## 2021-03-11 VITALS
HEIGHT: 64 IN | DIASTOLIC BLOOD PRESSURE: 74 MMHG | SYSTOLIC BLOOD PRESSURE: 112 MMHG | HEART RATE: 76 BPM | RESPIRATION RATE: 16 BRPM | BODY MASS INDEX: 27.14 KG/M2 | WEIGHT: 159 LBS

## 2021-03-11 DIAGNOSIS — N95.1 MENOPAUSAL SYNDROME: ICD-10-CM

## 2021-03-11 DIAGNOSIS — R73.02 IMPAIRED GLUCOSE TOLERANCE: Primary | ICD-10-CM

## 2021-03-11 DIAGNOSIS — E03.9 HYPOTHYROIDISM, UNSPECIFIED TYPE: ICD-10-CM

## 2021-03-11 DIAGNOSIS — G51.0 FACIAL PARALYSIS/BELLS PALSY: ICD-10-CM

## 2021-03-11 DIAGNOSIS — E78.5 DYSLIPIDEMIA: ICD-10-CM

## 2021-03-11 DIAGNOSIS — M85.80 OSTEOPENIA, UNSPECIFIED LOCATION: ICD-10-CM

## 2021-03-11 DIAGNOSIS — E55.9 VITAMIN D DEFICIENCY: ICD-10-CM

## 2021-03-11 PROCEDURE — 99212 OFFICE O/P EST SF 10 MIN: CPT | Performed by: NURSE PRACTITIONER

## 2021-03-11 PROCEDURE — 99214 OFFICE O/P EST MOD 30 MIN: CPT | Performed by: NURSE PRACTITIONER

## 2021-03-11 ASSESSMENT — ENCOUNTER SYMPTOMS
VOMITING: 0
NAUSEA: 0
CHEST TIGHTNESS: 0
DIARRHEA: 0
SORE THROAT: 0
SHORTNESS OF BREATH: 0

## 2021-03-11 ASSESSMENT — PATIENT HEALTH QUESTIONNAIRE - PHQ9
2. FEELING DOWN, DEPRESSED OR HOPELESS: 0
SUM OF ALL RESPONSES TO PHQ QUESTIONS 1-9: 0
SUM OF ALL RESPONSES TO PHQ QUESTIONS 1-9: 0
SUM OF ALL RESPONSES TO PHQ9 QUESTIONS 1 & 2: 0

## 2021-03-11 NOTE — PROGRESS NOTES
[facility] Internal Medicine  [address] [place], [address] Alfreda Atkins  [phone]      [address] c/o of Hyperlipidemia (6 month appt) and Blood Sugar Problem (IFG- 6 month appt)      HPI:     HPI  Patient presents for evaluation and management of chronic medical conditions as noted below. Continues to work on diet for elevated fasting glucose, which is stable. Most recent . Discussed obtaining A1C. Patient had previously declined statin for dyslipidemia. However, following referral to neurology for Bell's Palsy, microvascular changes on MRI - was started on crestor. Tolerating this well. Significant decrease in lipid panel. Patient does have a history of hypothyroidism, and had been well controlled without medication until recently. However, most recent TSH was slightly elevated at 6.58, with normal FT4. Had palpitations with [place] thyroid previously. She is currently asymptomatic, denies fatigue or weight gain. Discussed risks and benefits of starting levothyroxine at this time. Patient would like to wait, will plan to recheck TSH in 3 months, and will consider levothyroxine at that time if further increase in TSH. Vitamin D well controlled on increased dose of vitamin D supplement. She follows with OBGYN for her osteopenia and menopausal symptoms. Bell's palsy have continued to improve with physical therapy.        The 10-year ASCVD risk score (Quoc Aguayo., et al., 2013) is: 5.1%    Values used to calculate the score:      Age: 74 years      Sex: Female      Is Non- : No      Diabetic: No      Tobacco smoker: No      Systolic Blood Pressure: 668 mmHg      Is BP treated: No      HDL Cholesterol: 72 mg/dL      Total Cholesterol: 160 mg/dL    Current Outpatient Medications   Medication Sig Dispense Refill    NONFORMULARY [place] Cinnamon 1000 mg daily      rosuvastatin (CRESTOR) 5 MG tablet Take 1 tablet by mouth nightly 90 tablet 3    Turmeric (QC TUMERIC COMPLEX PO) Take 1,600 mg by mouth daily       Multiple Vitamins-Minerals (PRESERVISION AREDS 2) CAPS Take 1 capsule by mouth 2 times daily      QUERCETIN PO Take 2 capsules by mouth 2 times daily      vitamin D3 (CHOLECALCIFEROL) 25 MCG (1000 UT) TABS tablet Take 1 tablet by mouth daily 90 tablet 1    MILK THISTLE PO Take 100 mg by mouth 2 times daily      NONFORMULARY Iodine 2%  - 6 drops orally every morning      Omega-3 Fatty Acids (OMEGA 3 PO) Take 3,200 mg by mouth daily      Coenzyme Q10 (CO Q-10) 300 MG CAPS Take 1 capsule by mouth daily      Calcium Carb-Cholecalciferol (CALCIUM 1000 + D PO) Take 4,000 mg by mouth daily each pill contains Vitamin D3 600 IU      CRANBERRY PO Take 650 mg by mouth daily       aspirin 81 MG tablet Take 81 mg by mouth daily       GARLIC Take 081 mg by mouth daily.  Lactobacillus (ACIDOPHILUS PO) Take 420 mg by mouth PB8 acidophilus 1 gm daily.  Ascorbic Acid (VITAMIN C) 1000 MG tablet Take 1,000 mg by mouth daily.  NONFORMULARY Formula [employer]VM 75 daily. Multivitamin with chelated minerals      Methylsulfonylmethane (MSM PO) Take 3 tablets by mouth daily MSM/Silica combo daily for bone loss. She gets this at [facility]. No current facility-administered medications for this visit. Allergies   Allergen Reactions    Ciprofloxacin Nausea Only    Evista [Raloxifene] Other (See Comments)     Caused joint pain and menopausal symptoms    Statins Other (See Comments)     Lethargy, muscle pain    Other      Bandaids?  Left a rash    Sulfa Antibiotics Hives and Rash       Health Maintenance   Topic Date Due    A1C test (Diabetic or Prediabetic)  11/14/2020    Annual Wellness Visit (AWV)  03/17/2021    COVID-19 Vaccine (2 of 2 - Moderna series) 03/22/2021    Breast cancer screen  11/05/2021    Lipid screen  02/02/2022    TSH testing  02/02/2022    DTaP/Tdap/Td vaccine (2 - Td) 04/09/2023    Colon cancer screen colonoscopy  03/12/2029    DEXA (modify frequency per FRAX score)  Completed    Flu vaccine  Completed    Shingles Vaccine  Completed    Pneumococcal 65+ years Vaccine  Completed    Hepatitis C screen  Completed    Hepatitis A vaccine  Aged Out    Hepatitis B vaccine  Aged Out    Hib vaccine  Aged Out    Meningococcal (ACWY) vaccine  Aged Out       Subjective:      Review of Systems   Constitutional: Negative for chills and fever. HENT: Negative for congestion and sore throat. Respiratory: Negative for chest tightness and shortness of breath. Cardiovascular: Negative for chest pain and leg swelling. Gastrointestinal: Negative for diarrhea, nausea and vomiting. Genitourinary: Negative for difficulty urinating and dysuria. Musculoskeletal: Negative for gait problem and myalgias. Neurological: Negative for dizziness and headaches. Psychiatric/Behavioral: Negative for confusion and decreased concentration. Objective:     Vitals:    03/11/21 0733   BP: 112/74   Site: Right Upper Arm   Position: Sitting   Cuff Size: Medium Adult   Pulse: 76   Resp: 16   Weight: 159 lb (72.1 kg)   Height: 5' 4\" (1.626 m)     Physical Exam  Vitals signs and nursing note reviewed. Constitutional:       General: She is not in acute distress. Appearance: She is well-developed. HENT:      Head: Normocephalic and atraumatic. Right Ear: External ear normal.      Left Ear: External ear normal.   Eyes:      General: Lids are normal.      Extraocular Movements: Extraocular movements intact. Conjunctiva/sclera: Conjunctivae normal.      Comments: Minimal drooping of left eyelid, improved compared to previous Bell's palsy symptoms   Neck:      Musculoskeletal: Neck supple. Thyroid: No thyromegaly. Cardiovascular:      Rate and Rhythm: Normal rate and regular rhythm. Heart sounds: No murmur.    Pulmonary:      Effort: Pulmonary effort is normal. No accessory muscle usage or respiratory distress. Breath sounds: Normal breath sounds. No wheezing, rhonchi or rales. Abdominal:      Palpations: Abdomen is soft. Tenderness: There is no abdominal tenderness. There is no guarding or rebound. Musculoskeletal: Normal range of motion. Right lower leg: No edema. Left lower leg: No edema. Lymphadenopathy:      Cervical: No cervical adenopathy. Skin:     General: Skin is warm and dry. Nails: There is no clubbing. Neurological:      Mental Status: She is alert. Coordination: Coordination normal.   Psychiatric:         Mood and Affect: Mood normal.         Speech: Speech normal.         Behavior: Behavior normal.         Assessment/Plan:        1. Impaired glucose tolerance, stable  - Continue efforts at diet. Recheck A1C  - Hemoglobin A1C; Future    2. Dyslipidemia, stable  - Improved with crestor, has been tolerating this well. Continue to monitor    3. Hypothyroidism, unspecified type, uncontrolled  - TSH was slightly elevated at 6.58, with normal FT4. Given she previously did not tolerate [place] thyroid, will recheck TSH in 3 months and consider levothyroxine if further increase in TSH.   - TSH With Reflex Ft4; Future    4. Vitamin D deficiency,  stable  - Continue to monitor    5. Osteopenia, unspecified location, stable  6. Menopausal syndrome, stable  - Continue to follow with OBGYN    7. Facial paralysis/[place] palsy, improving  - Continue to monitor for resolution        Return in about 6 months (around 9/11/2021). Orders Placed This Encounter   Procedures    Hemoglobin A1C     Standing Status:   Future     Standing Expiration Date:   3/11/2022    TSH With Reflex Ft4     Standing Status:   Future     Standing Expiration Date:   3/11/2022     No orders of the defined types were placed in this encounter. All patient questions answered. Pt voiced understanding.              Electronically signed by JAI Meng on 3/11/2021 at 12:12 PM

## 2021-03-18 ENCOUNTER — TELEPHONE (OUTPATIENT)
Dept: INTERNAL MEDICINE | Age: 66
End: 2021-03-18

## 2021-03-18 DIAGNOSIS — R30.0 DYSURIA: Primary | ICD-10-CM

## 2021-03-18 DIAGNOSIS — R39.15 URINARY URGENCY: ICD-10-CM

## 2021-03-18 NOTE — TELEPHONE ENCOUNTER
Received call from pt- c/o UTI sx: urgency, dysuria. She will do UA today. Allergy to Sulfa (Hives).   Uses [facility].

## 2021-03-19 ENCOUNTER — IMMUNIZATION (OUTPATIENT)
Dept: LAB | Age: 66
End: 2021-03-19
Payer: MEDICARE

## 2021-03-19 ENCOUNTER — TELEPHONE (OUTPATIENT)
Dept: INTERNAL MEDICINE | Age: 66
End: 2021-03-19

## 2021-03-19 ENCOUNTER — HOSPITAL ENCOUNTER (OUTPATIENT)
Dept: LAB | Age: 66
Discharge: HOME | End: 2021-03-19
Payer: MEDICARE

## 2021-03-19 DIAGNOSIS — R30.0 DYSURIA: ICD-10-CM

## 2021-03-19 DIAGNOSIS — E55.9 VITAMIN D DEFICIENCY: ICD-10-CM

## 2021-03-19 DIAGNOSIS — R39.15 URINARY URGENCY: ICD-10-CM

## 2021-03-19 LAB
-: ABNORMAL
AMORPHOUS: ABNORMAL
BACTERIA: ABNORMAL
BILIRUBIN URINE: ABNORMAL
CASTS UA: ABNORMAL /LPF (ref 0–2)
COLOR: ABNORMAL
COMMENT UA: ABNORMAL
CRYSTALS, UA: ABNORMAL /HPF
EPITHELIAL CELLS UA: ABNORMAL /HPF (ref 0–5)
GLUCOSE URINE: NEGATIVE
KETONES, URINE: NEGATIVE
LEUKOCYTE ESTERASE, URINE: ABNORMAL
MUCUS: ABNORMAL
NITRITE, URINE: NEGATIVE
OTHER OBSERVATIONS UA: ABNORMAL
PH UA: 6 (ref 5–6)
PROTEIN UA: ABNORMAL
RBC UA: >100 /HPF (ref 0–4)
RENAL EPITHELIAL, UA: ABNORMAL /HPF
SPECIFIC GRAVITY UA: 1.02 (ref 1.01–1.02)
TRICHOMONAS: ABNORMAL
TURBIDITY: ABNORMAL
URINE HGB: ABNORMAL
UROBILINOGEN, URINE: NORMAL
WBC UA: ABNORMAL /HPF (ref 0–4)
YEAST: ABNORMAL

## 2021-03-19 PROCEDURE — 81001 URINALYSIS AUTO W/SCOPE: CPT

## 2021-03-19 PROCEDURE — 0012A COVID-19, MODERNA VACCINE 100MCG/0.5ML DOSE: CPT

## 2021-03-19 RX ORDER — CEPHALEXIN 500 MG/1
500 CAPSULE ORAL 2 TIMES DAILY
Qty: 20 CAPSULE | Refills: 0 | Status: SHIPPED | OUTPATIENT
Start: 2021-03-19 | End: 2021-03-29

## 2021-03-19 NOTE — TELEPHONE ENCOUNTER
Patient called in requesting UA results and for something to be sent to [facility] Sandoval if needed. Patient does not want to wait until Monday     Please advise.

## 2021-06-07 ENCOUNTER — HOSPITAL ENCOUNTER (OUTPATIENT)
Dept: LAB | Age: 66
Discharge: HOME | End: 2021-06-07
Payer: MEDICARE

## 2021-06-07 DIAGNOSIS — E03.9 HYPOTHYROIDISM, UNSPECIFIED TYPE: ICD-10-CM

## 2021-06-07 DIAGNOSIS — R73.02 IMPAIRED GLUCOSE TOLERANCE: ICD-10-CM

## 2021-06-07 LAB
ESTIMATED AVERAGE GLUCOSE: 111 MG/DL
HBA1C MFR BLD: 5.5 % (ref 4–6)
TSH SERPL DL<=0.05 MIU/L-ACNC: 3.62 MIU/L (ref 0.3–5)

## 2021-06-07 PROCEDURE — 83036 HEMOGLOBIN GLYCOSYLATED A1C: CPT

## 2021-06-07 PROCEDURE — 84443 ASSAY THYROID STIM HORMONE: CPT

## 2021-06-07 PROCEDURE — 36415 COLL VENOUS BLD VENIPUNCTURE: CPT

## 2021-09-09 ENCOUNTER — OFFICE VISIT (OUTPATIENT)
Dept: INTERNAL MEDICINE | Age: 66
End: 2021-09-09
Payer: MEDICARE

## 2021-09-09 VITALS
BODY MASS INDEX: 26.98 KG/M2 | HEART RATE: 72 BPM | HEIGHT: 64 IN | WEIGHT: 158 LBS | RESPIRATION RATE: 16 BRPM | SYSTOLIC BLOOD PRESSURE: 120 MMHG | DIASTOLIC BLOOD PRESSURE: 66 MMHG

## 2021-09-09 DIAGNOSIS — N95.1 MENOPAUSAL SYNDROME: ICD-10-CM

## 2021-09-09 DIAGNOSIS — E03.9 HYPOTHYROIDISM, UNSPECIFIED TYPE: ICD-10-CM

## 2021-09-09 DIAGNOSIS — E55.9 VITAMIN D DEFICIENCY: ICD-10-CM

## 2021-09-09 DIAGNOSIS — R73.02 IMPAIRED GLUCOSE TOLERANCE: Primary | ICD-10-CM

## 2021-09-09 DIAGNOSIS — E78.5 DYSLIPIDEMIA: ICD-10-CM

## 2021-09-09 DIAGNOSIS — G51.0 FACIAL PARALYSIS/BELLS PALSY: ICD-10-CM

## 2021-09-09 DIAGNOSIS — M85.80 OSTEOPENIA, UNSPECIFIED LOCATION: ICD-10-CM

## 2021-09-09 PROCEDURE — 99212 OFFICE O/P EST SF 10 MIN: CPT | Performed by: NURSE PRACTITIONER

## 2021-09-09 PROCEDURE — 99214 OFFICE O/P EST MOD 30 MIN: CPT | Performed by: NURSE PRACTITIONER

## 2021-09-09 ASSESSMENT — ENCOUNTER SYMPTOMS
CHEST TIGHTNESS: 0
NAUSEA: 0
SHORTNESS OF BREATH: 0
SORE THROAT: 0
VOMITING: 0
DIARRHEA: 0

## 2021-09-09 NOTE — PROGRESS NOTES
 CRANBERRY PO Take 1,300 mg by mouth 2 times daily       aspirin 81 MG tablet Take 81 mg by mouth daily       GARLIC Take 732 mg by mouth daily.  Lactobacillus (ACIDOPHILUS PO) Take by mouth PB8 acidophilus 1 gm daily.  Ascorbic Acid (VITAMIN C) 1000 MG tablet Take 1,000 mg by mouth daily.  NONFORMULARY Formula UVM 75 daily. Multivitamin with chelated minerals      Methylsulfonylmethane (MSM PO) Take 3 tablets by mouth daily        No current facility-administered medications for this visit. Allergies   Allergen Reactions    Ciprofloxacin Nausea Only    Evista [Raloxifene] Other (See Comments)     Caused joint pain and menopausal symptoms    Statins Other (See Comments)     Lethargy, muscle pain    Other      Bandaids? Left a rash    Sulfa Antibiotics Hives and Rash       Health Maintenance   Topic Date Due    Annual Wellness Visit (AWV)  Never done    Flu vaccine (1) 09/01/2021    Lipid screen  02/02/2022    TSH testing  06/07/2022    Breast cancer screen  11/04/2022    DTaP/Tdap/Td vaccine (2 - Td or Tdap) 04/09/2023    Diabetes screen  06/07/2024    Colon cancer screen colonoscopy  03/12/2029    DEXA (modify frequency per FRAX score)  Completed    Shingles Vaccine  Completed    Pneumococcal 65+ years Vaccine  Completed    COVID-19 Vaccine  Completed    Hepatitis C screen  Completed    Hepatitis A vaccine  Aged Out    Hepatitis B vaccine  Aged Out    Hib vaccine  Aged Out    Meningococcal (ACWY) vaccine  Aged Out       Subjective:      Review of Systems   Constitutional: Negative for chills and fever. HENT: Negative for congestion and sore throat. Respiratory: Negative for chest tightness and shortness of breath. Cardiovascular: Negative for chest pain and leg swelling. Gastrointestinal: Negative for diarrhea, nausea and vomiting. Genitourinary: Negative for difficulty urinating and dysuria. Musculoskeletal: Negative for gait problem and myalgias. Neurological: Negative for dizziness and headaches. Psychiatric/Behavioral: Negative for confusion and decreased concentration. Objective:     Vitals:    09/09/21 0744   BP: 120/66   Site: Right Upper Arm   Position: Sitting   Cuff Size: Medium Adult   Pulse: 72   Resp: 16   Weight: 158 lb (71.7 kg)   Height: 5' 4\" (1.626 m)     Physical Exam  Vitals and nursing note reviewed. Constitutional:       General: She is not in acute distress. Appearance: She is well-developed. HENT:      Head: Normocephalic and atraumatic. Right Ear: External ear normal.      Left Ear: External ear normal.   Eyes:      General: Lids are normal.      Extraocular Movements: Extraocular movements intact. Conjunctiva/sclera: Conjunctivae normal.   Neck:      Thyroid: No thyromegaly. Cardiovascular:      Rate and Rhythm: Normal rate and regular rhythm. Heart sounds: No murmur heard. Pulmonary:      Effort: Pulmonary effort is normal. No accessory muscle usage or respiratory distress. Breath sounds: Normal breath sounds. No wheezing, rhonchi or rales. Abdominal:      Palpations: Abdomen is soft. Tenderness: There is no abdominal tenderness. There is no guarding or rebound. Musculoskeletal:         General: Normal range of motion. Cervical back: Neck supple. Right lower leg: No edema. Left lower leg: No edema. Lymphadenopathy:      Cervical: No cervical adenopathy. Skin:     General: Skin is warm and dry. Nails: There is no clubbing. Neurological:      Mental Status: She is alert. Coordination: Coordination normal.      Comments: Slight facial asymmetry, weakness on R facial muscles, as residual from Bell's palsy. Slight improvement from previous exam.    Psychiatric:         Mood and Affect: Mood normal.         Speech: Speech normal.         Behavior: Behavior normal.         Assessment/Plan:        1.  Impaired glucose tolerance, stable  - A1C 5.5, continue efforts at diet    2. Dyslipidemia,  stable  - Continue to monitor  - Lipid Panel; Future  - Comprehensive Metabolic Panel; Future    3. Hypothyroidism, unspecified type, stable  - Well controlled without medication, continue to monitor    4. Vitamin D deficiency, stable  - Continue supplement  - Vitamin D 25 Hydroxy; Future    5. Menopausal syndrome, stable  - Continue to follow with OBGYN    6. Facial paralysis/[place] palsy,  stable  - Continue to monitor    7. Osteopenia, unspecified location, stable  - Continue supplement        Return in about 4 months (around 1/9/2022). Orders Placed This Encounter   Procedures    Lipid Panel     Standing Status:   Future     Standing Expiration Date:   9/9/2022     Order Specific Question:   Is Patient Fasting?/# of Hours     Answer:   8    Comprehensive Metabolic Panel     Standing Status:   Future     Standing Expiration Date:   9/9/2022    Vitamin D 25 Hydroxy     Standing Status:   Future     Standing Expiration Date:   9/9/2022     No orders of the defined types were placed in this encounter. All patient questions answered. Pt voiced understanding.        Electronically signed by SCOTT Chilel CNP on 9/9/2021 at 8:08 AM

## 2021-10-07 ENCOUNTER — IMMUNIZATION (OUTPATIENT)
Dept: LAB | Age: 66
End: 2021-10-07
Payer: MEDICARE

## 2021-10-07 PROCEDURE — PBSHW INFLUENZA, QUADV, ADJUVANTED, 65 YRS +, IM, PF, PREFILL SYR, 0.5ML (FLUAD): Performed by: NURSE PRACTITIONER

## 2021-10-07 PROCEDURE — 90694 VACC AIIV4 NO PRSRV 0.5ML IM: CPT | Performed by: NURSE PRACTITIONER

## 2021-10-07 PROCEDURE — G0008 ADMIN INFLUENZA VIRUS VAC: HCPCS | Performed by: NURSE PRACTITIONER

## 2021-11-05 LAB — MAMMOGRAPHY, EXTERNAL: NEGATIVE

## 2021-11-10 ENCOUNTER — IMMUNIZATION (OUTPATIENT)
Dept: LAB | Age: 66
End: 2021-11-10
Payer: MEDICARE

## 2021-11-10 PROCEDURE — 91301 COVID-19, MODERNA BOOSTER VACCINE 0.25ML DOSE: CPT | Performed by: INTERNAL MEDICINE

## 2021-11-10 PROCEDURE — PBSHW COVID-19, MODERNA BOOSTER VACCINE 0.25ML DOSE: Performed by: INTERNAL MEDICINE

## 2022-01-06 ENCOUNTER — HOSPITAL ENCOUNTER (OUTPATIENT)
Dept: LAB | Age: 67
Discharge: HOME | End: 2022-01-06
Payer: MEDICARE

## 2022-01-06 DIAGNOSIS — E78.5 DYSLIPIDEMIA: ICD-10-CM

## 2022-01-06 DIAGNOSIS — E55.9 VITAMIN D DEFICIENCY: ICD-10-CM

## 2022-01-06 LAB
ALBUMIN SERPL-MCNC: 3.9 G/DL (ref 3.5–5.2)
ALBUMIN/GLOBULIN RATIO: 1.4 (ref 1–2.5)
ALP BLD-CCNC: 84 U/L (ref 35–104)
ALT SERPL-CCNC: 18 U/L (ref 5–33)
ANION GAP SERPL CALCULATED.3IONS-SCNC: 9 MMOL/L (ref 9–17)
AST SERPL-CCNC: 21 U/L
BILIRUB SERPL-MCNC: 0.58 MG/DL (ref 0.3–1.2)
BUN BLDV-MCNC: 16 MG/DL (ref 8–23)
BUN/CREAT BLD: 26 (ref 9–20)
CALCIUM SERPL-MCNC: 9.6 MG/DL (ref 8.6–10.4)
CHLORIDE BLD-SCNC: 110 MMOL/L (ref 98–107)
CHOLESTEROL/HDL RATIO: 2.5
CHOLESTEROL: 174 MG/DL
CO2: 23 MMOL/L (ref 20–31)
CREAT SERPL-MCNC: 0.61 MG/DL (ref 0.5–0.9)
GFR AFRICAN AMERICAN: >60 ML/MIN
GFR NON-AFRICAN AMERICAN: >60 ML/MIN
GFR SERPL CREATININE-BSD FRML MDRD: ABNORMAL ML/MIN/{1.73_M2}
GFR SERPL CREATININE-BSD FRML MDRD: ABNORMAL ML/MIN/{1.73_M2}
GLUCOSE BLD-MCNC: 107 MG/DL (ref 70–99)
HDLC SERPL-MCNC: 71 MG/DL
LDL CHOLESTEROL: 89 MG/DL (ref 0–130)
POTASSIUM SERPL-SCNC: 4.5 MMOL/L (ref 3.7–5.3)
SODIUM BLD-SCNC: 142 MMOL/L (ref 135–144)
TOTAL PROTEIN: 6.7 G/DL (ref 6.4–8.3)
TRIGL SERPL-MCNC: 71 MG/DL
VITAMIN D 25-HYDROXY: 36.8 NG/ML (ref 30–100)
VLDLC SERPL CALC-MCNC: NORMAL MG/DL (ref 1–30)

## 2022-01-06 PROCEDURE — 36415 COLL VENOUS BLD VENIPUNCTURE: CPT

## 2022-01-06 PROCEDURE — 82306 VITAMIN D 25 HYDROXY: CPT

## 2022-01-06 PROCEDURE — 80053 COMPREHEN METABOLIC PANEL: CPT

## 2022-01-06 PROCEDURE — 80061 LIPID PANEL: CPT

## 2022-01-13 ENCOUNTER — OFFICE VISIT (OUTPATIENT)
Dept: INTERNAL MEDICINE | Age: 67
End: 2022-01-13
Payer: MEDICARE

## 2022-01-13 VITALS
DIASTOLIC BLOOD PRESSURE: 72 MMHG | WEIGHT: 161 LBS | BODY MASS INDEX: 27.49 KG/M2 | HEIGHT: 64 IN | SYSTOLIC BLOOD PRESSURE: 126 MMHG | RESPIRATION RATE: 16 BRPM | HEART RATE: 76 BPM

## 2022-01-13 DIAGNOSIS — E78.5 DYSLIPIDEMIA: ICD-10-CM

## 2022-01-13 DIAGNOSIS — R73.02 IMPAIRED GLUCOSE TOLERANCE: ICD-10-CM

## 2022-01-13 DIAGNOSIS — G51.0 FACIAL PARALYSIS/BELLS PALSY: ICD-10-CM

## 2022-01-13 DIAGNOSIS — R42 ORTHOSTATIC DIZZINESS: ICD-10-CM

## 2022-01-13 DIAGNOSIS — E03.9 HYPOTHYROIDISM, UNSPECIFIED TYPE: ICD-10-CM

## 2022-01-13 DIAGNOSIS — Z00.00 ROUTINE GENERAL MEDICAL EXAMINATION AT A HEALTH CARE FACILITY: Primary | ICD-10-CM

## 2022-01-13 DIAGNOSIS — M85.80 OSTEOPENIA, UNSPECIFIED LOCATION: ICD-10-CM

## 2022-01-13 DIAGNOSIS — E55.9 VITAMIN D DEFICIENCY: ICD-10-CM

## 2022-01-13 DIAGNOSIS — N95.1 MENOPAUSAL SYNDROME: ICD-10-CM

## 2022-01-13 PROCEDURE — G0439 PPPS, SUBSEQ VISIT: HCPCS | Performed by: NURSE PRACTITIONER

## 2022-01-13 PROCEDURE — 99214 OFFICE O/P EST MOD 30 MIN: CPT | Performed by: NURSE PRACTITIONER

## 2022-01-13 PROCEDURE — 99212 OFFICE O/P EST SF 10 MIN: CPT | Performed by: NURSE PRACTITIONER

## 2022-01-13 RX ORDER — LIFITEGRAST 50 MG/ML
1 SOLUTION/ DROPS OPHTHALMIC 2 TIMES DAILY
COMMUNITY
Start: 2021-10-14 | End: 2022-07-14 | Stop reason: ALTCHOICE

## 2022-01-13 ASSESSMENT — PATIENT HEALTH QUESTIONNAIRE - PHQ9
SUM OF ALL RESPONSES TO PHQ QUESTIONS 1-9: 2
2. FEELING DOWN, DEPRESSED OR HOPELESS: 1
SUM OF ALL RESPONSES TO PHQ9 QUESTIONS 1 & 2: 2
SUM OF ALL RESPONSES TO PHQ QUESTIONS 1-9: 2
SUM OF ALL RESPONSES TO PHQ QUESTIONS 1-9: 2
1. LITTLE INTEREST OR PLEASURE IN DOING THINGS: 1
SUM OF ALL RESPONSES TO PHQ QUESTIONS 1-9: 2

## 2022-01-13 ASSESSMENT — ENCOUNTER SYMPTOMS
SORE THROAT: 0
SHORTNESS OF BREATH: 0
DIARRHEA: 0
CHEST TIGHTNESS: 0
VOMITING: 0
NAUSEA: 0

## 2022-01-13 ASSESSMENT — LIFESTYLE VARIABLES
HOW OFTEN DO YOU HAVE A DRINK CONTAINING ALCOHOL: 4
HAVE YOU OR SOMEONE ELSE BEEN INJURED AS A RESULT OF YOUR DRINKING: 0
HOW OFTEN DURING THE LAST YEAR HAVE YOU HAD A FEELING OF GUILT OR REMORSE AFTER DRINKING: 0
AUDIT TOTAL SCORE: 4
HOW OFTEN DURING THE LAST YEAR HAVE YOU FAILED TO DO WHAT WAS NORMALLY EXPECTED FROM YOU BECAUSE OF DRINKING: 0
HOW OFTEN DURING THE LAST YEAR HAVE YOU BEEN UNABLE TO REMEMBER WHAT HAPPENED THE NIGHT BEFORE BECAUSE YOU HAD BEEN DRINKING: 0
HOW OFTEN DURING THE LAST YEAR HAVE YOU FOUND THAT YOU WERE NOT ABLE TO STOP DRINKING ONCE YOU HAD STARTED: 0
HOW OFTEN DURING THE LAST YEAR HAVE YOU NEEDED AN ALCOHOLIC DRINK FIRST THING IN THE MORNING TO GET YOURSELF GOING AFTER A NIGHT OF HEAVY DRINKING: 0
HOW MANY STANDARD DRINKS CONTAINING ALCOHOL DO YOU HAVE ON A TYPICAL DAY: 0
HAS A RELATIVE, FRIEND, DOCTOR, OR ANOTHER HEALTH PROFESSIONAL EXPRESSED CONCERN ABOUT YOUR DRINKING OR SUGGESTED YOU CUT DOWN: 0
AUDIT-C TOTAL SCORE: 4
HOW OFTEN DO YOU HAVE SIX OR MORE DRINKS ON ONE OCCASION: 0

## 2022-01-13 NOTE — PROGRESS NOTES
Medicare Annual Wellness Visit  Name: Susannah Duffy Date: 2022   MRN: [id number] Sex: Female   Age: 74 y.o. Ethnicity: Non- / Non    : 1955 Race: White (non-)      Jennifer Hayes is here for Medicare AWV, Hyperlipidemia (4 month appt), and Other (IGT- 4 month appt)    Screenings for behavioral, psychosocial and functional/safety risks, and cognitive dysfunction are all negative except as indicated below. These results, as well as other patient data from the [address] form, are documented in Flowsheets linked to this Encounter. Allergies   Allergen Reactions    Ciprofloxacin Nausea Only    Evista [Raloxifene] Other (See Comments)     Caused joint pain and menopausal symptoms    Statins Other (See Comments)     Lethargy, muscle pain    Other      Bandaids? Left a rash    Sulfa Antibiotics Hives and Rash         Prior to Visit Medications    Medication Sig Taking?  Authorizing Provider   NONFORMULARY Silymarin- one capsule daily Yes Historical Provider, MD   XIIDRA 5 % SOLN Place 1 drop into both eyes 2 times daily Yes Historical Provider, MD   rosuvastatin (CRESTOR) 5 MG tablet TAKE 1 TABLET NIGHTLY Yes SCOTT Carrillo - CNP   Nutritional Supplements (SILICA PO) Take by mouth 448 mg- 3 tablets daily Yes Historical Provider, MD   Barberry-Oreg Grape-Goldenseal (BERBERINE COMPLEX PO) Take 250 mg by mouth daily Yes Historical Provider, MD   NONFORMULARY [place] Cinnamon 1000 mg bid Yes Historical Provider, MD   Turmeric (QC TUMERIC COMPLEX PO) Take 1,500 mg by mouth daily  Yes Historical Provider, MD   Multiple Vitamins-Minerals (PRESERVISION AREDS 2) CAPS Take 1 capsule by mouth 2 times daily Yes Historical Provider, MD   QUERCETIN PO Take 2 capsules by mouth 2 times daily (800 mg each), with Bromeline 165 mg each Yes Historical Provider, MD   vitamin D3 (CHOLECALCIFEROL) 25 MCG (1000 UT) TABS tablet Take 1 tablet by mouth daily Yes Gabbie Chacon SCOTT Stein - CNP   NONFORMULARY Iodine 2%  - 6 drops orally every morning Yes Historical Provider, MD   Omega-3 Fatty Acids (OMEGA 3 PO) Take 1,600 mg by mouth daily  Yes Historical Provider, MD   Coenzyme Q10 (CO Q-10) 300 MG CAPS Take 1 capsule by mouth daily Yes Historical Provider, MD   Calcium Carb-Cholecalciferol (CALCIUM 1000 + D PO) Take 1,000 mg by mouth daily  Yes Historical Provider, MD   CRANBERRY PO Take 1,300 mg by mouth 2 times daily  Yes Historical Provider, MD   aspirin 81 MG tablet Take 81 mg by mouth daily  Yes Historical Provider, MD   GARLIC Take 487 mg by mouth daily. Yes Historical Provider, MD   Lactobacillus (ACIDOPHILUS PO) Take by mouth PB8 acidophilus 1 gm daily. Yes Historical Provider, MD   Ascorbic Acid (VITAMIN C) 1000 MG tablet Take 1,000 mg by mouth daily. Yes Historical Provider, MD   NONFORMULARY Formula [facility] 75 daily. Multivitamin with chelated minerals Yes Historical Provider, MD   Methylsulfonylmethane (MSM PO) Take 3 tablets by mouth daily  Yes Historical Provider, MD         Past Medical History:   Diagnosis Date    Hypercholesterolemia     Hypothyroidism     Subacute, not currently on treeatment.  Impaired glucose tolerance     Prediabetes/stable and checking A1C yearly    Macular degeneration     Menopausal syndrome     Mitral regurgitation     Echo 1/19 mild MR    Osteopenia     Dexa 8/18 FRAX 1% at hip, T -1.5 Hip    Vitamin D deficiency        Past Surgical History:   Procedure Laterality Date    BLADDER SUSPENSION      In the past for a cystocele.  BREAST BIOPSY Right     Negative.  COLONOSCOPY N/A 3/12/2019    COLONOSCOPY performed by Leodan Shook DO at [address] [place] [address]      Was done due to a cystocele several years ago and was negagtive.  DILATION AND CURETTAGE OF UTERUS      With hysteroscopy.  ENDOMETRIAL ABLATION      At age 44.  SIGMOIDOSCOPY      At age 52.  Was negative per patien, although she did not have a followup barium doorways, halls and stairs free of clutter?: Yes  Do you always fasten your seatbelt when you are in a car?: (!) No  Safety Interventions:  · Home safety tips provided     Personalized Preventive Plan   Current Health Maintenance Status  Immunization History   Administered Date(s) Administered    COVID-19, Moderna, Booster, PF, 50mcg/0.25ml 11/10/2021    COVID-19, Graciella Belling, Primary or Immunocompromised, PF, 100mcg/0.5mL 02/22/2021, 03/19/2021    Influenza Vaccine, unspecified formulation 09/21/2013, 10/04/2014, 10/10/2015, 10/08/2016    Influenza Virus Vaccine 09/21/2013, 10/04/2014, 10/10/2015, 10/03/2017    Influenza, High Dose (Fluzone 65 yrs and older) 10/09/2018    Influenza, MDCK Quadv, IM, PF (Flucelvax 2 yrs and older) 10/03/2017    Influenza, Quadv, adjuvanted, 65 yrs +, IM, PF (Fluad) 09/04/2020, 10/07/2021    Influenza, Triv, inactivated, subunit, adjuvanted, IM (Fluad 65 yrs and older) 10/07/2019    Pneumococcal Conjugate 13-valent ([id number]) 02/16/2018    Pneumococcal Polysaccharide ([id number]) 02/14/2019    Tdap (Boostrix, Adacel) 04/09/2013    Zoster Live (Zostavax) 04/09/2013    Zoster Recombinant (Shingrix) 03/01/2018, 10/31/2018        Health Maintenance   Topic Date Due    Annual Wellness Visit (AWV)  03/17/2021    Depression Screen  03/11/2022    TSH testing  06/07/2022    Lipid screen  01/06/2023    DTaP/Tdap/Td vaccine (2 - Td or Tdap) 04/09/2023    Breast cancer screen  11/05/2023    Diabetes screen  06/07/2024    Colon cancer screen colonoscopy  03/12/2029    DEXA (modify frequency per FRAX score)  Completed    Flu vaccine  Completed    Shingles Vaccine  Completed    Pneumococcal 65+ years Vaccine  Completed    COVID-19 Vaccine  Completed    Hepatitis C screen  Completed    Hepatitis A vaccine  Aged Out    Hepatitis B vaccine  Aged Out    Hib vaccine  Aged Out    Meningococcal (ACWY) vaccine  Aged Out     Recommendations for [facility] Due: see orders and patient instructions/AVS.  . Recommended screening schedule for the next 5-10 years is provided to the patient in written form: see Patient Instructions/AVS.    I, Selwyn Mills LPN, [birth year], performed the documented evaluation under the direct supervision of the attending NP. [facility]  [address] [place], [address]  [phone]      Qian Calvillo c/o of Medicare AWV, Hyperlipidemia (4 month appt), and Other (IGT- 4 month appt)      HPI:     HPI  Patient also presents for evaluation and management of chronic medical conditions as noted below. Continues to work on diet for elevated fasting glucose, most recent . Dyslipidemia stable on crestor, ASCVD 5.1%    Hypothyroidism well controlled without medication    Vit D deficiency well controlled on current dose of supplement. Continues on calcium supplement for osteopenia, most recent DEXA 11/2020    Follows with OB/GYN for menopausal symptoms. Residual facial symptoms from Bell's palsy have been stable. Has been noting occasional orthostatic symptoms, particularly when bending over. Borderline hypotensive 110/76 today. Orthostatic BPs were ordered and reviewed. Discussed increasing hydration and taking extra time to stand up, monitor for improvement of symptoms.      The 10-year ASCVD risk score (Kori Rogers., et al., 2013) is: 6.6%    Values used to calculate the score:      Age: 74 years      Sex: Female      Is Non- : No      Diabetic: No      Tobacco smoker: No      Systolic Blood Pressure: 941 mmHg      Is BP treated: No      HDL Cholesterol: 71 mg/dL      Total Cholesterol: 174 mg/dL      Current Outpatient Medications   Medication Sig Dispense Refill    NONFORMULARY Silymarin- one capsule daily      XIIDRA 5 % SOLN Place 1 drop into both eyes 2 times daily      rosuvastatin (CRESTOR) 5 MG tablet TAKE 1 TABLET NIGHTLY 90 tablet 3    Nutritional Supplements (SILICA PO) Take by mouth 448 mg- 3 tablets daily      Barberry-Oreg Grape-Goldenseal (BERBERINE COMPLEX PO) Take 250 mg by mouth daily      NONFORMULARY [place] Cinnamon 1000 mg bid      Turmeric (QC TUMERIC COMPLEX PO) Take 1,500 mg by mouth daily       Multiple Vitamins-Minerals (PRESERVISION AREDS 2) CAPS Take 1 capsule by mouth 2 times daily      QUERCETIN PO Take 2 capsules by mouth 2 times daily (800 mg each), with Bromeline 165 mg each      vitamin D3 (CHOLECALCIFEROL) 25 MCG (1000 UT) TABS tablet Take 1 tablet by mouth daily 90 tablet 1    NONFORMULARY Iodine 2%  - 6 drops orally every morning      Omega-3 Fatty Acids (OMEGA 3 PO) Take 1,600 mg by mouth daily       Coenzyme Q10 (CO Q-10) 300 MG CAPS Take 1 capsule by mouth daily      Calcium Carb-Cholecalciferol (CALCIUM 1000 + D PO) Take 1,000 mg by mouth daily       CRANBERRY PO Take 1,300 mg by mouth 2 times daily       aspirin 81 MG tablet Take 81 mg by mouth daily       GARLIC Take 756 mg by mouth daily.  Lactobacillus (ACIDOPHILUS PO) Take by mouth PB8 acidophilus 1 gm daily.  Ascorbic Acid (VITAMIN C) 1000 MG tablet Take 1,000 mg by mouth daily.  NONFORMULARY Formula UVM 75 daily. Multivitamin with chelated minerals      Methylsulfonylmethane (MSM PO) Take 3 tablets by mouth daily        No current facility-administered medications for this visit. Allergies   Allergen Reactions    Ciprofloxacin Nausea Only    Evista [Raloxifene] Other (See Comments)     Caused joint pain and menopausal symptoms    Statins Other (See Comments)     Lethargy, muscle pain    Other      Bandaids?  Left a rash    Sulfa Antibiotics Hives and Rash       Health Maintenance   Topic Date Due    Annual Wellness Visit (AWV)  03/17/2021    Depression Screen  03/11/2022    TSH testing  06/07/2022    Lipid screen  01/06/2023    DTaP/Tdap/Td vaccine (2 - Td or Tdap) 04/09/2023    Breast cancer screen  11/05/2023    Diabetes screen 06/07/2024    Colon cancer screen colonoscopy  03/12/2029    DEXA (modify frequency per FRAX score)  Completed    Flu vaccine  Completed    Shingles Vaccine  Completed    Pneumococcal 65+ years Vaccine  Completed    COVID-19 Vaccine  Completed    Hepatitis C screen  Completed    Hepatitis A vaccine  Aged Out    Hepatitis B vaccine  Aged Out    Hib vaccine  Aged Out    Meningococcal (ACWY) vaccine  Aged Out       Subjective:      Review of Systems   Constitutional: Negative for chills and fever. HENT: Negative for congestion and sore throat. Respiratory: Negative for chest tightness and shortness of breath. Cardiovascular: Negative for chest pain and leg swelling. Gastrointestinal: Negative for diarrhea, nausea and vomiting. Genitourinary: Negative for difficulty urinating and dysuria. Musculoskeletal: Negative for gait problem and myalgias. Neurological: Negative for dizziness and headaches. Psychiatric/Behavioral: Negative for confusion and decreased concentration. Objective:     Vitals:    01/13/22 0730 01/13/22 0759 01/13/22 0800 01/13/22 0802   BP: 110/76 120/72 120/74 126/72   Site: Right Upper Arm Right Upper Arm Right Upper Arm Right Upper Arm   Position: Sitting Supine Sitting Standing   Cuff Size: Medium Adult Medium Adult Medium Adult Medium Adult   Pulse: 68 72 76    Resp: 16      Weight: 161 lb (73 kg)      Height: 5' 4\" (1.626 m)        Physical Exam  Vitals and nursing note reviewed. Constitutional:       General: She is not in acute distress. Appearance: She is well-developed. HENT:      Head: Normocephalic and atraumatic. Right Ear: External ear normal.      Left Ear: External ear normal.   Eyes:      Extraocular Movements: Extraocular movements intact. Conjunctiva/sclera: Conjunctivae normal.      Comments: R lid slight drooping as residual from Bell's palsy   Neck:      Thyroid: No thyromegaly.    Cardiovascular:      Rate and Rhythm: Normal rate and regular rhythm. Heart sounds: No murmur heard. Pulmonary:      Effort: Pulmonary effort is normal. No accessory muscle usage or respiratory distress. Breath sounds: Normal breath sounds. No wheezing, rhonchi or rales. Abdominal:      Palpations: Abdomen is soft. Tenderness: There is no abdominal tenderness. There is no guarding or rebound. Musculoskeletal:         General: Normal range of motion. Cervical back: Neck supple. Right lower leg: No edema. Left lower leg: No edema. Lymphadenopathy:      Cervical: No cervical adenopathy. Skin:     General: Skin is warm and dry. Nails: There is no clubbing. Neurological:      Mental Status: She is alert. Coordination: Coordination normal.   Psychiatric:         Mood and Affect: Mood normal.         Speech: Speech normal.         Behavior: Behavior normal.         Assessment/Plan:        1. Routine general medical examination at a health care facility  - As noted above    2. Impaired glucose tolerance, stable  - Continue efforts at diet and weight loss  - Hemoglobin A1C; Future  - Basic Metabolic Panel; Future    3. Dyslipidemia,  stable  - Continue to monitor    4. Hypothyroidism, unspecified type,  stable  - Continue to monitor  - TSH With Reflex Ft4; Future    5. Vitamin D deficiency, stable  - Continue supplement    6. Osteopenia, unspecified location, stable  - Continue supplement    7. Menopausal syndrome, stable  - Continue to follow with OBGYN    8. Facial paralysis/Porter palsy, stable  - Continue to monitor    9. Orthostatic dizziness, new problem  - Borderline hypotensive 110/76 today. Orthostatic BPs were ordered and reviewed. Discussed increasing hydration and taking extra time to stand up, follow up if symptoms do not improve. Return in 6 months (on 7/13/2022) for Medicare Annual Wellness Visit in 1 year.     Orders Placed This Encounter   Procedures    Hemoglobin A1C     Standing Status:   Future Standing Expiration Date:   1/13/2023    Basic Metabolic Panel     Standing Status:   Future     Standing Expiration Date:   1/13/2023    TSH With Reflex Ft4     Standing Status:   Future     Standing Expiration Date:   1/13/2023     No orders of the defined types were placed in this encounter. All patient questions answered. Pt voiced understanding.            Electronically signed by SCOTT Haas CNP on 1/13/2022 at 8:07 AM

## 2022-01-13 NOTE — PATIENT INSTRUCTIONS
Personalized Preventive Plan for Mai Calvillo - 1/13/2022  Medicare offers a range of preventive health benefits. Some of the tests and screenings are paid in full while other may be subject to a deductible, co-insurance, and/or copay. Some of these benefits include a comprehensive review of your medical history including lifestyle, illnesses that may run in your family, and various assessments and screenings as appropriate. After reviewing your medical record and screening and assessments performed today your provider may have ordered immunizations, labs, imaging, and/or referrals for you. A list of these orders (if applicable) as well as your Preventive Care list are included within your After Visit Summary for your review. Other Preventive Recommendations:    · A preventive eye exam performed by an eye specialist is recommended every 1-2 years to screen for glaucoma; cataracts, macular degeneration, and other eye disorders. · A preventive dental visit is recommended every 6 months. · Try to get at least 150 minutes of exercise per week or 10,000 steps per day on a pedometer . · Order or download the FREE \"Exercise & Physical Activity: Your Everyday Guide\" from [employer]. Call 2-410.200.7081 or search [employer] online. · You need 6661-3666 mg of calcium and 9580-6773 IU of vitamin D per day. It is possible to meet your calcium requirement with diet alone, but a vitamin D supplement is usually necessary to meet this goal.  · When exposed to the sun, use a sunscreen that protects against both UVA and UVB radiation with an SPF of 30 or greater. Reapply every 2 to 3 hours or after sweating, drying off with a towel, or swimming. · Always wear a seat belt when traveling in a car. Always wear a helmet when riding a bicycle or motorcycle. Personalized Preventive Plan for Mai Calvillo - 1/13/2022  Medicare offers a range of preventive health benefits. Some of the tests and screenings are paid in full while other may be subject to a deductible, co-insurance, and/or copay. Some of these benefits include a comprehensive review of your medical history including lifestyle, illnesses that may run in your family, and various assessments and screenings as appropriate. After reviewing your medical record and screening and assessments performed today your provider may have ordered immunizations, labs, imaging, and/or referrals for you. A list of these orders (if applicable) as well as your Preventive Care list are included within your After Visit Summary for your review. Other Preventive Recommendations:    A preventive eye exam performed by an eye specialist is recommended every 1-2 years to screen for glaucoma; cataracts, macular degeneration, and other eye disorders. A preventive dental visit is recommended every 6 months. Try to get at least 150 minutes of exercise per week or 10,000 steps per day on a pedometer . Order or download the FREE \"Exercise & Physical Activity: Your Everyday Guide\" from [employer]. Call 5-970.255.4629 or search [employer] online. You need 1564-0631 mg of calcium and 6876-2109 IU of vitamin D per day. It is possible to meet your calcium requirement with diet alone, but a vitamin D supplement is usually necessary to meet this goal.  When exposed to the sun, use a sunscreen that protects against both UVA and UVB radiation with an SPF of 30 or greater. Reapply every 2 to 3 hours or after sweating, drying off with a towel, or swimming. Always wear a seat belt when traveling in a car. Always wear a helmet when riding a bicycle or motorcycle. Patient Education        Orthostatic Hypotension: Care Instructions  Your Care Instructions     Orthostatic hypotension is a quick drop in blood pressure. It happens when you get up from sitting or lying down.  You may feel faint, lightheaded, or dizzy.  When a person sits up or stands up, the body changes the way it pumps blood. This can slow the flow of blood to the brain for a very short time. And that can make you feel lightheaded. Many medicines can cause this problem, especially in older people. Lack of fluids (dehydration) or illnesses such as diabetes or heart disease also can cause it. Follow-up care is a key part of your treatment and safety. Be sure to make and go to all appointments, and call your doctor if you are having problems. It's also a good idea to know your test results and keep a list of the medicines you take. How can you care for yourself at home? · Be safe with medicines. Call your doctor if you think you are having a problem with your medicine. You will get more details on the specific medicines your doctor prescribes. · If you feel dizzy or lightheaded, sit down or lie down for a few minutes. Or you can sit down and put your head between your knees. This will help your blood pressure go back to normal and help your symptoms go away. · Follow your doctor's suggestions for ways to prevent symptoms like dizziness. These suggestions may include:  ? Get up slowly from bed or after sitting for a long time. If you are in bed, roll to your side and swing your legs over the edge of the bed and onto the floor. Push your body up to a sitting position. Wait for a while before you slowly stand up.  ? Add more salt to your diet, if your doctor recommends it. ? Drink plenty of fluids. Choose water and other clear liquids. If you have kidney, heart, or liver disease and have to limit fluids, talk with your doctor before you increase the amount of fluids you drink. ? Avoid or limit alcohol to 2 drinks a day for men and 1 drink a day for women. Alcohol may interfere with your medicine. In addition, alcohol can make your low blood pressure worse by causing your body to lose water. ?  Wear compression stockings to help improve blood flow.  When should you call for help? Call 911 anytime you think you may need emergency care. For example, call if:    · You passed out (lost consciousness). Watch closely for changes in your health, and be sure to contact your doctor if:    · You do not get better as expected. Where can you learn more? Go to [email or web address] org and sign in to your [employer] account. Enter E587 in the [employer] box to learn more about \"Orthostatic Hypotension: Care Instructions. \"     If you do not have an account, please click on the \"Sign Up Now\" link. Current as of: April 29, 2021               Content Version: 13.1  © 2006-2021 [employer]. Care instructions adapted under license by [facility]. If you have questions about a medical condition or this instruction, always ask your healthcare professional. [address] any warranty or liability for your use of this information.

## 2022-03-29 ENCOUNTER — PATIENT MESSAGE (OUTPATIENT)
Dept: INTERNAL MEDICINE | Age: 67
End: 2022-03-29

## 2022-03-29 NOTE — TELEPHONE ENCOUNTER
From: Qian Calvillo  To: Brice Quintana  Sent: 3/29/2022 2:08 PM EDT  Subject: Booster    Hi Laurenshould I be getting a second COVID booster?

## 2022-05-04 ENCOUNTER — TELEPHONE (OUTPATIENT)
Dept: INTERNAL MEDICINE | Age: 67
End: 2022-05-04

## 2022-05-04 ENCOUNTER — IMMUNIZATION (OUTPATIENT)
Dept: LAB | Age: 67
End: 2022-05-04
Payer: MEDICARE

## 2022-05-04 DIAGNOSIS — Z11.52 ENCOUNTER FOR SCREENING FOR COVID-19: Primary | ICD-10-CM

## 2022-05-04 PROCEDURE — 91306 COVID-19, MODERNA BOOSTER VACCINE 0.25ML DOSE: CPT | Performed by: INTERNAL MEDICINE

## 2022-05-04 PROCEDURE — PBSHW COVID-19, MODERNA BOOSTER VACCINE 0.25ML DOSE: Performed by: INTERNAL MEDICINE

## 2022-05-04 NOTE — TELEPHONE ENCOUNTER
Patient will be flying out to [place] on May 13th and needs COVID testing 3 days prior so they have results for ok to fly. Can you order ?   Let her know process [phone]

## 2022-05-10 ENCOUNTER — HOSPITAL ENCOUNTER (OUTPATIENT)
Age: 67
Setting detail: SPECIMEN
Discharge: HOME | End: 2022-05-10
Payer: MEDICARE

## 2022-05-10 DIAGNOSIS — Z11.52 ENCOUNTER FOR SCREENING FOR COVID-19: ICD-10-CM

## 2022-05-10 PROCEDURE — U0003 INFECTIOUS AGENT DETECTION BY NUCLEIC ACID (DNA OR RNA); SEVERE ACUTE RESPIRATORY SYNDROME CORONAVIRUS 2 (SARS-COV-2) (CORONAVIRUS DISEASE [COVID-19]), AMPLIFIED PROBE TECHNIQUE, MAKING USE OF HIGH THROUGHPUT TECHNOLOGIES AS DESCRIBED BY CMS-2020-01-R: HCPCS

## 2022-05-10 PROCEDURE — U0005 INFEC AGEN DETEC AMPLI PROBE: HCPCS

## 2022-05-11 LAB
SARS-COV-2: NORMAL
SARS-COV-2: NOT DETECTED
SOURCE: NORMAL

## 2022-06-10 ENCOUNTER — OFFICE VISIT (OUTPATIENT)
Dept: PRIMARY CARE CLINIC | Age: 67
End: 2022-06-10
Payer: MEDICARE

## 2022-06-10 VITALS
SYSTOLIC BLOOD PRESSURE: 110 MMHG | TEMPERATURE: 98.2 F | HEIGHT: 64 IN | OXYGEN SATURATION: 95 % | DIASTOLIC BLOOD PRESSURE: 80 MMHG | WEIGHT: 157 LBS | BODY MASS INDEX: 26.8 KG/M2 | HEART RATE: 74 BPM

## 2022-06-10 DIAGNOSIS — M54.32 BILATERAL SCIATICA: Primary | ICD-10-CM

## 2022-06-10 DIAGNOSIS — M54.31 BILATERAL SCIATICA: Primary | ICD-10-CM

## 2022-06-10 PROCEDURE — 99212 OFFICE O/P EST SF 10 MIN: CPT | Performed by: NURSE PRACTITIONER

## 2022-06-10 PROCEDURE — 99203 OFFICE O/P NEW LOW 30 MIN: CPT | Performed by: NURSE PRACTITIONER

## 2022-06-10 PROCEDURE — 1123F ACP DISCUSS/DSCN MKR DOCD: CPT | Performed by: NURSE PRACTITIONER

## 2022-06-10 RX ORDER — TRAMADOL HYDROCHLORIDE 50 MG/1
50 TABLET ORAL EVERY 6 HOURS PRN
Qty: 12 TABLET | Refills: 0 | Status: SHIPPED | OUTPATIENT
Start: 2022-06-10 | End: 2022-06-13

## 2022-06-10 RX ORDER — PREDNISONE 10 MG/1
10 TABLET ORAL 2 TIMES DAILY
Qty: 10 TABLET | Refills: 0 | Status: SHIPPED | OUTPATIENT
Start: 2022-06-10 | End: 2022-06-15

## 2022-06-10 RX ORDER — TIZANIDINE 4 MG/1
4 TABLET ORAL 3 TIMES DAILY PRN
Qty: 30 TABLET | Refills: 0 | Status: SHIPPED | OUTPATIENT
Start: 2022-06-10

## 2022-06-10 ASSESSMENT — ENCOUNTER SYMPTOMS
BACK PAIN: 1
BOWEL INCONTINENCE: 0

## 2022-06-10 ASSESSMENT — PATIENT HEALTH QUESTIONNAIRE - PHQ9
SUM OF ALL RESPONSES TO PHQ QUESTIONS 1-9: 0
1. LITTLE INTEREST OR PLEASURE IN DOING THINGS: 0
2. FEELING DOWN, DEPRESSED OR HOPELESS: 0
SUM OF ALL RESPONSES TO PHQ QUESTIONS 1-9: 0
SUM OF ALL RESPONSES TO PHQ9 QUESTIONS 1 & 2: 0
SUM OF ALL RESPONSES TO PHQ QUESTIONS 1-9: 0
SUM OF ALL RESPONSES TO PHQ QUESTIONS 1-9: 0

## 2022-06-10 NOTE — PROGRESS NOTES
Subjective:      Patient ID: Barron Snow is a 69 y.o. female coming in for   Chief Complaint   Patient presents with    Back Pain     bilateral back pain shoots down both legs. sx began wed and have progressively worse. Back Pain  This is a new problem. Episode onset: 6/8/22. The problem occurs constantly. The problem has been gradually worsening since onset. The pain is present in the lumbar spine and sacro-iliac (bilateral paralumbar region). The quality of the pain is described as stabbing and shooting. The pain radiates to the left knee and right knee. The pain is at a severity of 8/10. The symptoms are aggravated by sitting and lying down. Pertinent negatives include no bladder incontinence, bowel incontinence, numbness, paresthesias or perianal numbness. Risk factors: denies any recent injury or trauma. She has tried ice and heat (salonpas) for the symptoms. The treatment provided no relief. Review of Systems   Gastrointestinal: Negative for bowel incontinence. Genitourinary: Negative for bladder incontinence. Musculoskeletal: Positive for back pain. Neurological: Negative for numbness and paresthesias. Objective:  /80   Pulse 74   Temp 98.2 °F (36.8 °C) (Tympanic)   Ht 5' 4\" (1.626 m)   Wt 157 lb (71.2 kg)   LMP 09/28/2005   SpO2 95%   BMI 26.95 kg/m²      Physical Exam  Vitals and nursing note reviewed. Constitutional:       General: She is not in acute distress. Appearance: Normal appearance. She is not ill-appearing. Pulmonary:      Effort: Pulmonary effort is normal.   Musculoskeletal:      Lumbar back: Tenderness present. No swelling, edema or deformity. Normal range of motion. Negative right straight leg raise test and negative left straight leg raise test.        Back:    Skin:     General: Skin is warm and dry. Findings: No rash. Neurological:      General: No focal deficit present.       Mental Status: She is alert and oriented to person, place, and time. Assessment:      1. Bilateral sciatica           Plan:   -medications as prescribed  -advised to use heat and perform stretching/exercises afterwards  -call PCP next week if symptoms worsen/persist        No orders of the defined types were placed in this encounter. Outpatient Encounter Medications as of 6/10/2022   Medication Sig Dispense Refill    tiZANidine (ZANAFLEX) 4 MG tablet Take 1 tablet by mouth 3 times daily as needed (as needed for back spasms) 30 tablet 0    predniSONE (DELTASONE) 10 MG tablet Take 1 tablet by mouth 2 times daily for 5 days 10 tablet 0    traMADol (ULTRAM) 50 MG tablet Take 1 tablet by mouth every 6 hours as needed for Pain for up to 3 days. Intended supply: 3 days. Take lowest dose possible to manage pain 12 tablet 0    NONFORMULARY Silymarin- one capsule daily      rosuvastatin (CRESTOR) 5 MG tablet TAKE 1 TABLET NIGHTLY 90 tablet 3    Nutritional Supplements (SILICA PO) Take by mouth 448 mg- 3 tablets daily      Barberry-Oreg Grape-Goldenseal (BERBERINE COMPLEX PO) Take 250 mg by mouth daily      NONFORMULARY [place] Cinnamon 1000 mg bid      Turmeric (QC TUMERIC COMPLEX PO) Take 1,500 mg by mouth daily       Multiple Vitamins-Minerals (PRESERVISION AREDS 2) CAPS Take 1 capsule by mouth 2 times daily      QUERCETIN PO Take 2 capsules by mouth 2 times daily (800 mg each), with Bromeline 165 mg each      vitamin D3 (CHOLECALCIFEROL) 25 MCG (1000 UT) TABS tablet Take 1 tablet by mouth daily 90 tablet 1    NONFORMULARY Iodine 2%  - 6 drops orally every morning      Omega-3 Fatty Acids (OMEGA 3 PO) Take 1,600 mg by mouth daily       Coenzyme Q10 (CO Q-10) 300 MG CAPS Take 1 capsule by mouth daily      Calcium Carb-Cholecalciferol (CALCIUM 1000 + D PO) Take 1,000 mg by mouth daily       CRANBERRY PO Take 1,300 mg by mouth 2 times daily       aspirin 81 MG tablet Take 81 mg by mouth daily       GARLIC Take 185 mg by mouth daily.       Lactobacillus (ACIDOPHILUS PO) Take by mouth PB8 acidophilus 1 gm daily.  Ascorbic Acid (VITAMIN C) 1000 MG tablet Take 1,000 mg by mouth daily.  NONFORMULARY Formula [facility]VM 75 daily. Multivitamin with chelated minerals      Methylsulfonylmethane (MSM PO) Take 3 tablets by mouth daily       XIIDRA 5 % SOLN Place 1 drop into both eyes 2 times daily (Patient not taking: Reported on 6/10/2022)       No facility-administered encounter medications on file as of 6/10/2022.             Adam Chaney, SCOTT - CNP

## 2022-06-10 NOTE — PATIENT INSTRUCTIONS
Patient Education        Sciatica: Exercises  Introduction  Here are some examples of typical rehabilitation exercises for your condition. Start each exercise slowly. Ease off the exercise if you start to have pain. Your doctor or physical therapist will tell you when you can start theseexercises and which ones will work best for you. When you are not being active, find a comfortable position for rest. Some people are comfortable on the floor or a medium-firm bed with a small pillow under their head and another under their knees. Some people prefer to lie on their side with a pillow between their knees. Don't stay in one position fortoo long. Take short walks (10 to 20 minutes) every 2 to 3 hours. Avoid slopes, hills, and stairs until you feel better. Walk only distances you can manage withoutpain, especially leg pain. How to do the exercises  Back stretches    1. Get down on your hands and knees on the floor. 2. Relax your head and allow it to droop. Round your back up toward the ceiling until you feel a nice stretch in your upper, middle, and lower back. Hold this stretch for as long as it feels comfortable, or about 15 to 30 seconds. 3. Return to the starting position with a flat back while you are on your hands and knees. 4. Let your back sway by pressing your stomach toward the floor. Lift your buttocks toward the ceiling. 5. Hold this position for 15 to 30 seconds. 6. Repeat 2 to 4 times. Follow-up care is a key part of your treatment and safety. Be sure to make and go to all appointments, and call your doctor if you are having problems. It's also a good idea to know your test results and keep alist of the medicines you take. Where can you learn more? Go to [email or web address]://[employer] Therapeuticsaltagracia.Amb[employer]. org and sign in to your Dataupia account. Enter P893 in the Room box to learn more about \"Sciatica: Exercises. \"     If you do not have an account, please click on the \"Sign Up Now\" link.  Current as of: July 1, 2021               Content Version: 13.2  © 8297-2860 [employer]. Care instructions adapted under license by [facility]. If you have questions about a medical condition or this instruction, always ask your healthcare professional. [address] any warranty or liability for your use of this information.

## 2022-07-07 ENCOUNTER — HOSPITAL ENCOUNTER (OUTPATIENT)
Dept: LAB | Age: 67
Discharge: HOME | End: 2022-07-07
Payer: MEDICARE

## 2022-07-07 DIAGNOSIS — R73.02 IMPAIRED GLUCOSE TOLERANCE: ICD-10-CM

## 2022-07-07 DIAGNOSIS — E03.9 HYPOTHYROIDISM, UNSPECIFIED TYPE: ICD-10-CM

## 2022-07-07 LAB
ANION GAP SERPL CALCULATED.3IONS-SCNC: 10 MMOL/L (ref 9–17)
BUN BLDV-MCNC: 18 MG/DL (ref 8–23)
BUN/CREAT BLD: 23 (ref 9–20)
CALCIUM SERPL-MCNC: 9.7 MG/DL (ref 8.6–10.4)
CHLORIDE BLD-SCNC: 108 MMOL/L (ref 98–107)
CO2: 26 MMOL/L (ref 20–31)
CREAT SERPL-MCNC: 0.77 MG/DL (ref 0.5–0.9)
ESTIMATED AVERAGE GLUCOSE: 120 MG/DL
GFR AFRICAN AMERICAN: >60 ML/MIN
GFR NON-AFRICAN AMERICAN: >60 ML/MIN
GFR SERPL CREATININE-BSD FRML MDRD: ABNORMAL ML/MIN/{1.73_M2}
GLUCOSE BLD-MCNC: 99 MG/DL (ref 70–99)
HBA1C MFR BLD: 5.8 % (ref 4–6)
POTASSIUM SERPL-SCNC: 4.1 MMOL/L (ref 3.7–5.3)
SODIUM BLD-SCNC: 144 MMOL/L (ref 135–144)
TSH SERPL DL<=0.05 MIU/L-ACNC: 3.53 UIU/ML (ref 0.3–5)

## 2022-07-07 PROCEDURE — 80048 BASIC METABOLIC PNL TOTAL CA: CPT

## 2022-07-07 PROCEDURE — 84443 ASSAY THYROID STIM HORMONE: CPT

## 2022-07-07 PROCEDURE — 83036 HEMOGLOBIN GLYCOSYLATED A1C: CPT

## 2022-07-07 PROCEDURE — 36415 COLL VENOUS BLD VENIPUNCTURE: CPT

## 2022-07-14 ENCOUNTER — OFFICE VISIT (OUTPATIENT)
Dept: INTERNAL MEDICINE | Age: 67
End: 2022-07-14
Payer: MEDICARE

## 2022-07-14 VITALS
SYSTOLIC BLOOD PRESSURE: 110 MMHG | BODY MASS INDEX: 26.98 KG/M2 | DIASTOLIC BLOOD PRESSURE: 60 MMHG | WEIGHT: 158 LBS | RESPIRATION RATE: 16 BRPM | HEART RATE: 76 BPM | HEIGHT: 64 IN

## 2022-07-14 DIAGNOSIS — M85.80 OSTEOPENIA, UNSPECIFIED LOCATION: ICD-10-CM

## 2022-07-14 DIAGNOSIS — R73.02 IMPAIRED GLUCOSE TOLERANCE: Primary | ICD-10-CM

## 2022-07-14 DIAGNOSIS — E78.5 DYSLIPIDEMIA: ICD-10-CM

## 2022-07-14 DIAGNOSIS — R42 DIZZINESS: ICD-10-CM

## 2022-07-14 DIAGNOSIS — E03.9 HYPOTHYROIDISM, UNSPECIFIED TYPE: ICD-10-CM

## 2022-07-14 DIAGNOSIS — I44.4 LEFT ANTERIOR FASCICULAR BLOCK: ICD-10-CM

## 2022-07-14 DIAGNOSIS — G51.0 FACIAL PARALYSIS/BELLS PALSY: ICD-10-CM

## 2022-07-14 DIAGNOSIS — N95.1 MENOPAUSAL SYNDROME: ICD-10-CM

## 2022-07-14 DIAGNOSIS — E55.9 VITAMIN D DEFICIENCY: ICD-10-CM

## 2022-07-14 PROCEDURE — 1123F ACP DISCUSS/DSCN MKR DOCD: CPT | Performed by: NURSE PRACTITIONER

## 2022-07-14 PROCEDURE — 93005 ELECTROCARDIOGRAM TRACING: CPT | Performed by: NURSE PRACTITIONER

## 2022-07-14 PROCEDURE — 99212 OFFICE O/P EST SF 10 MIN: CPT | Performed by: NURSE PRACTITIONER

## 2022-07-14 PROCEDURE — 99214 OFFICE O/P EST MOD 30 MIN: CPT | Performed by: NURSE PRACTITIONER

## 2022-07-14 PROCEDURE — 93010 ELECTROCARDIOGRAM REPORT: CPT | Performed by: NURSE PRACTITIONER

## 2022-07-14 RX ORDER — VARENICLINE 0.03 MG/.05ML
1 SPRAY NASAL 2 TIMES DAILY
COMMUNITY

## 2022-07-14 ASSESSMENT — ENCOUNTER SYMPTOMS
VOMITING: 0
SHORTNESS OF BREATH: 0
DIARRHEA: 0
NAUSEA: 0
SORE THROAT: 0
CHEST TIGHTNESS: 0

## 2022-07-14 NOTE — PATIENT INSTRUCTIONS
Patient Education        Epley Maneuver at Home for Vertigo: Exercises  Introduction  Vertigo is a spinning or whirling sensation when you move your head. Your doctor may have moved you in different positions to help your vertigo get better faster. This is called the Epley maneuver. Your doctor also may haveasked you to do these exercises at home. Do the exercises as often as your doctor recommends. If your vertigo is gettingworse, your doctor may have you change the exercise or stop it. Step 1  Step 1    1. Sit on the edge of a bed or sofa. Step 2    1. Turn your head 45 degrees in the direction your doctor told you to. This should be toward the ear that causes the most vertigo for you. In this picture, the woman is turning toward her left ear. Step 3    1. Tilt yourself backward until you are lying on your back. Your head should still be at a 45-degree turn. Your head should be about midway between looking straight ahead and looking out to your side. Hold for 30 seconds. If you have vertigo, stay in this position until it stops. Step 4    1. Turn your head 90 degrees toward the ear that has the least vertigo. In this picture, the woman is turning to the right because she has vertigo on her left side. The point of your chin should be raised and over your shoulder. Hold for 30 seconds. Step 5    1. Roll onto the side with the least vertigo. You should now be looking at the floor. Hold for 30 seconds. Follow-up care is a key part of your treatment and safety. Be sure to make and go to all appointments, and call your doctor if you are having problems. It's also a good idea to know your test results and keep alist of the medicines you take. Where can you learn more? Go to https://Invuppepiceweb.The [employer] website. org and sign in to your Dime account. Enter H959 in the YieldMo box to learn more about \"Epley Maneuver at Home for Vertigo: Exercises. \"     If you do not have an account, please click on the \"Sign Up Now\" link. Current as of: December 13, 2021               Content Version: 13.3  © 3204-9081 [employer], [employer]. Care instructions adapted under license by [facility] ([facility]). If you have questions about a medical condition or this instruction, always ask your healthcare professional. [place] [address] any warranty or liability for your use of this information.

## 2022-07-14 NOTE — PROGRESS NOTES
[facility]  [address] [place], [address] Alfreda Atkins  [phone]      [address] c/o of Hyperlipidemia (6 month appt) and Hypothyroidism (6 month appt)      HPI:     HPI   Patient presents for evaluation and management of chronic medical conditions as noted below. Impaired fasting glucose is well controlled with diet, A1c 5.8. Dyslipidemia stable on Crestor, ASCVD 5.8%. Hypothyroidism continues to be well controlled without medication. Vitamin D deficiency stable on current dose of supplement. Continues on calcium supplement for osteopenia, most recent DEXA 11/2020. Also follows with OB/GYN for menopausal symptoms. All residual facial symptoms from her Bell's palsy episode have remained stable, no further benefit from physical therapy was determined to be likely. She does continue to have some intermittent dizziness. Evaluated for orthostatic hypotension at previous visit. She is not necessarily noting her symptoms only upon standing, but also with quick head movements.       The 10-year ASCVD risk score (Aundra Denver., et al., 2013) is: 5.8%    Values used to calculate the score:      Age: 69 years      Sex: Female      Is Non- : No      Diabetic: No      Tobacco smoker: No      Systolic Blood Pressure: 501 mmHg      Is BP treated: No      HDL Cholesterol: 71 mg/dL      Total Cholesterol: 174 mg/dL    Current Outpatient Medications   Medication Sig Dispense Refill    NONFORMULARY French Grape Seed Extract 400 mg daily      Varenicline Tartrate (TYRVAYA) 0.03 MG/ACT SOLN 1 spray by Nasal route in the morning and at bedtime      tiZANidine (ZANAFLEX) 4 MG tablet Take 1 tablet by mouth 3 times daily as needed (as needed for back spasms) 30 tablet 0    NONFORMULARY Silymarin- one capsule daily      rosuvastatin (CRESTOR) 5 MG tablet TAKE 1 TABLET NIGHTLY 90 tablet 3    Nutritional Supplements (SILICA PO) Take by mouth 448 mg- 3 tablets daily      Barberry-Oreg Grape-Goldenseal (BERBERINE COMPLEX PO) Take 250 mg by mouth daily      NONFORMULARY [place] Cinnamon 1000 mg bid      Turmeric (QC TUMERIC COMPLEX PO) Take 1,500 mg by mouth daily       Multiple Vitamins-Minerals (PRESERVISION AREDS 2) CAPS Take 1 capsule by mouth 2 times daily      QUERCETIN PO Take 2 capsules by mouth 2 times daily (800 mg each), with Bromeline 165 mg each      vitamin D3 (CHOLECALCIFEROL) 25 MCG (1000 UT) TABS tablet Take 1 tablet by mouth daily 90 tablet 1    NONFORMULARY Iodine 2%  - 6 drops orally every morning      Omega-3 Fatty Acids (OMEGA 3 PO) Take 1,600 mg by mouth daily       Coenzyme Q10 (CO Q-10) 300 MG CAPS Take 1 capsule by mouth daily      Calcium Carb-Cholecalciferol (CALCIUM 1000 + D PO) Take 1,000 mg by mouth daily       CRANBERRY PO Take 1,300 mg by mouth 2 times daily       aspirin 81 MG tablet Take 81 mg by mouth daily       GARLIC Take 008 mg by mouth daily.  Lactobacillus (ACIDOPHILUS PO) Take by mouth PB8 acidophilus 1 gm daily.  Ascorbic Acid (VITAMIN C) 1000 MG tablet Take 1,000 mg by mouth daily.  NONFORMULARY Formula UVM 75 daily. Multivitamin with chelated minerals      Methylsulfonylmethane (MSM PO) Take 3 tablets by mouth daily        No current facility-administered medications for this visit. Allergies   Allergen Reactions    Ciprofloxacin Nausea Only    Evista [Raloxifene] Other (See Comments)     Caused joint pain and menopausal symptoms    Statins Other (See Comments)     Lethargy, muscle pain    Other      Bandaids?  Left a rash    Sulfa Antibiotics Hives and Rash       Health Maintenance   Topic Date Due    Flu vaccine (1) 09/01/2022    Lipids  01/06/2023    Annual Wellness Visit (AWV)  01/14/2023    DTaP/Tdap/Td vaccine (2 - Td or Tdap) 04/09/2023    Depression Screen  06/10/2023    A1C test (Diabetic or Prediabetic)  07/07/2023    Breast cancer screen  11/05/2023    Colorectal Cancer Screen  03/12/2029    DEXA (modify frequency per FRAX score)  Completed    Shingles vaccine  Completed    Pneumococcal 65+ years Vaccine  Completed    COVID-19 Vaccine  Completed    Hepatitis C screen  Completed    Hepatitis A vaccine  Aged Out    Hepatitis B vaccine  Aged Out    Hib vaccine  Aged Out    Meningococcal (ACWY) vaccine  Aged Out       Subjective:      Review of Systems   Constitutional: Negative for chills and fever. HENT: Negative for congestion and sore throat. Respiratory: Negative for chest tightness and shortness of breath. Cardiovascular: Negative for chest pain and leg swelling. Gastrointestinal: Negative for diarrhea, nausea and vomiting. Genitourinary: Negative for difficulty urinating and dysuria. Musculoskeletal: Negative for gait problem and myalgias. Neurological: Negative for dizziness and headaches. Intermittent dizziness, currently asymptomatic. Psychiatric/Behavioral: Negative for confusion and decreased concentration. Objective:     Vitals:    07/14/22 0725   BP: 110/60   Site: Right Upper Arm   Position: Sitting   Cuff Size: Medium Adult   Pulse: 76   Resp: 16   Weight: 158 lb (71.7 kg)   Height: 5' 4\" (1.626 m)     Physical Exam  Vitals and nursing note reviewed. Constitutional:       General: She is not in acute distress. Appearance: She is well-developed. HENT:      Head: Normocephalic and atraumatic. Right Ear: Tympanic membrane, ear canal and external ear normal.      Left Ear: Tympanic membrane, ear canal and external ear normal.      Ears:      Comments: Positive Clinton-Hallpike on R  Eyes:      General: Lids are normal.      Extraocular Movements: Extraocular movements intact. Conjunctiva/sclera: Conjunctivae normal.   Neck:      Thyroid: No thyromegaly. Cardiovascular:      Rate and Rhythm: Normal rate and regular rhythm. Heart sounds: No murmur heard.       Pulmonary:      Effort: Pulmonary effort is normal. No accessory muscle usage or respiratory distress. Breath sounds: Normal breath sounds. No wheezing, rhonchi or rales. Abdominal:      Palpations: Abdomen is soft. Tenderness: There is no abdominal tenderness. There is no guarding or rebound. Musculoskeletal:      Right lower leg: No edema. Left lower leg: No edema. Skin:     General: Skin is warm and dry. Nails: There is no clubbing. Neurological:      Mental Status: She is alert. Coordination: Coordination normal.   Psychiatric:         Mood and Affect: Mood normal.         Speech: Speech normal.         Behavior: Behavior normal.         Assessment/Plan:        1. Impaired glucose tolerance, stable  - Continue efforts at diet and weight loss    2. Dyslipidemia,  stable  - Continue current medications    3. Hypothyroidism, unspecified type, stable  - Continue to monitor    4. Vitamin D deficiency, stable  - Continue supplement    5. Osteopenia, unspecified location, stable  - Continue supplement    6. Menopausal syndrome, stable  - Continue to follow with OBGYN    7. Facial paralysis/Axson palsy, stable  - Continue to monitor    8. Dizziness, stable  - Symptoms are stable but not well controlled. Primarily suspect intermittent vertigo or similar, and provided patient with home Epley exercises to complete. Will consider HEENT referral if these are not effective. EKG today did note left anterior fascicular block. This is a new finding from previous EKG. Discussed possible cardiology referral.  - Lebron Archer MD, Otolaryngology, [place]  - EKG 12 Lead; Future  - EKG 12 Lead    9. Left anterior fascicular block, new problem  - Refer to cardiology as noted above. Advised to ER if any chest pain, dyspnea, or palpitations, patient expresses understanding.  - [facility], [place]        Return in about 6 months (around 1/14/2023).     Orders Placed This Encounter   Procedures   [employer] - Aleksandra Gasca MD, Otolaryngology, [place]     Referral Priority:   Routine     Referral Type:   Eval and Treat     Referral Reason:   Specialty Services Required     Referred to Provider:   Myles Mathews MD     Requested Specialty:   Otolaryngology     Number of Visits Requested:   [facility], [place]     Referral Priority:   Routine     Referral Type:   Eval and Treat     Referral Reason:   Specialty Services Required     Requested Specialty:   Cardiology     Number of Visits Requested:   1    EKG 12 Lead     Standing Status:   Future     Number of Occurrences:   1     Standing Expiration Date:   7/14/2023     Order Specific Question:   Reason for Exam?     Answer: Other     Comments:   dizziness     No orders of the defined types were placed in this encounter. All patient questions answered. Pt voiced understanding.            Electronically signed by Lazarus Britain, APRN - CNP on 7/14/2022 at 7:29 PM

## 2022-07-27 ENCOUNTER — OFFICE VISIT (OUTPATIENT)
Dept: CARDIOLOGY | Age: 67
End: 2022-07-27
Payer: MEDICARE

## 2022-07-27 VITALS
BODY MASS INDEX: 27.25 KG/M2 | RESPIRATION RATE: 16 BRPM | WEIGHT: 159.6 LBS | HEART RATE: 74 BPM | HEIGHT: 64 IN | DIASTOLIC BLOOD PRESSURE: 82 MMHG | SYSTOLIC BLOOD PRESSURE: 140 MMHG

## 2022-07-27 DIAGNOSIS — R94.31 ABNORMAL ECG: Primary | ICD-10-CM

## 2022-07-27 DIAGNOSIS — R06.09 DYSPNEA ON EXERTION: ICD-10-CM

## 2022-07-27 DIAGNOSIS — E78.5 HYPERLIPIDEMIA, UNSPECIFIED HYPERLIPIDEMIA TYPE: ICD-10-CM

## 2022-07-27 PROCEDURE — 99212 OFFICE O/P EST SF 10 MIN: CPT

## 2022-07-27 PROCEDURE — 99204 OFFICE O/P NEW MOD 45 MIN: CPT | Performed by: INTERNAL MEDICINE

## 2022-07-27 PROCEDURE — 1123F ACP DISCUSS/DSCN MKR DOCD: CPT | Performed by: INTERNAL MEDICINE

## 2022-07-27 NOTE — PROGRESS NOTES
Today's Date: 7/27/2022  Patient's Name: Lani Saxena  Patient's age: 69 y.o., 1955    Subjective:  Lani Saxena is being seen in clinic today regarding   Chief Complaint   Patient presents with    New Patient     Was seen by PCP for yearly checkup and EKG was done    Dizziness     Usually in the morning, as day goes on it gets better very sparadic         she is here to establish cardiology care. She reports imbalance and there is concern of vertigo and has been referred to ENT. An ECG was done and was abnormal and she was referred her. She reports dyspnea on taking flight of stairs. She denies any chest pain. No syncope no orthopnea. She reports sporadic palpitations for few seconds. Past Medical History:   has a past medical history of Hypercholesterolemia, Hypothyroidism, Impaired glucose tolerance, Macular degeneration, Menopausal syndrome, Mitral regurgitation, Osteopenia, and Vitamin D deficiency. Past Surgical History:   has a past surgical history that includes Breast biopsy (Right); Dilation and curettage of uterus; Endometrial ablation; Sigmoidoscopy; Cystocopy; Tubal ligation; bladder suspension; and Colonoscopy (N/A, 3/12/2019). Home Medications:  Prior to Admission medications    Medication Sig Start Date End Date Taking?  Authorizing Provider   NONFORMULARY French Grape Seed Extract 400 mg daily    Historical Provider, MD   Varenicline Tartrate (TYRVAYA) 0.03 MG/ACT SOLN 1 spray by Nasal route in the morning and at bedtime    Historical Provider, MD   tiZANidine (ZANAFLEX) 4 MG tablet Take 1 tablet by mouth 3 times daily as needed (as needed for back spasms) 6/10/22   SCOTT Hoang CNP   NONFORMULARY Silymarin- one capsule daily    Historical Provider, MD   rosuvastatin (CRESTOR) 5 MG tablet TAKE 1 TABLET NIGHTLY 9/27/21   SCOTT Singh CNP   Nutritional Supplements (SILICA PO) Take by mouth 448 mg- 3 tablets daily    Historical Provider, MD Barberry-Oreg Grape-Goldenseal (BERBERINE COMPLEX PO) Take 250 mg by mouth daily    Historical Provider, MD   NONFORMULARY [place] Cinnamon 1000 mg bid    Historical Provider, MD   Turmeric (QC TUMERIC COMPLEX PO) Take 1,500 mg by mouth daily     Historical Provider, MD   Multiple Vitamins-Minerals (PRESERVISION AREDS 2) CAPS Take 1 capsule by mouth 2 times daily    Historical Provider, MD   QUERCETIN PO Take 2 capsules by mouth 2 times daily (800 mg each), with Bromeline 165 mg each    Historical Provider, MD   vitamin D3 (CHOLECALCIFEROL) 25 MCG (1000 UT) TABS tablet Take 1 tablet by mouth daily 9/10/20   SCOTT Espinoza - CNP   NONFORMULARY Iodine 2%  - 6 drops orally every morning    Historical Provider, MD   Omega-3 Fatty Acids (OMEGA 3 PO) Take 1,600 mg by mouth daily     Historical Provider, MD   Coenzyme Q10 (CO Q-10) 300 MG CAPS Take 1 capsule by mouth daily    Historical Provider, MD   Calcium Carb-Cholecalciferol (CALCIUM 1000 + D PO) Take 1,000 mg by mouth daily     Historical Provider, MD   CRANBERRY PO Take 1,300 mg by mouth 2 times daily     Historical Provider, MD   aspirin 81 MG tablet Take 81 mg by mouth daily     Historical Provider, MD   GARLIC Take 975 mg by mouth daily. Historical Provider, MD   Lactobacillus (ACIDOPHILUS PO) Take by mouth PB8 acidophilus 1 gm daily. Historical Provider, MD   Ascorbic Acid (VITAMIN C) 1000 MG tablet Take 1,000 mg by mouth daily. Historical Provider, MD   NONFORMULARY Formula UVM 75 daily. Multivitamin with chelated minerals    Historical Provider, MD   Methylsulfonylmethane (MSM PO) Take 3 tablets by mouth daily     Historical Provider, MD       Allergies:  Ciprofloxacin, Evista [raloxifene], Statins, Other, and Sulfa antibiotics    Social History:   reports that she has never smoked. She has never used smokeless tobacco. She reports current alcohol use. She reports that she does not use drugs.      Family History: family history includes Alzheimer's Disease in her mother; Cancer in her brother; Coronary Art Dis in her father; Diabetes in her mother; Heart Attack in her father; High Cholesterol in her brother and father; Hypertension in her father; Other in her child; Stroke in her father. No h/o sudden cardiac death. No for premature CAD    REVIEW OF SYSTEMS:    Constitutional: there has been no unanticipated weight loss. There's been No change in energy level, No change in activity level. Eyes: No visual changes or diplopia. No scleral icterus. ENT: No Headaches, hearing loss or vertigo. No mouth sores or sore throat. Cardiovascular: see above  Respiratory: see above  Gastrointestinal: No abdominal pain, appetite loss, blood in stools. Genitourinary: No dysuria, trouble voiding, or hematuria. Musculoskeletal:  No gait disturbance, No weakness or joint complaints. Integumentary: No rash or pruritis. Neurological: No headache or diplopia. No tingling  Psychiatric: No anxiety, or depression. Endocrine: No temperature intolerance. Hematologic/Lymphatic: No abnormal bruising or bleeding, blood clots or swollen lymph nodes. Allergic/Immunologic: No nasal congestion or hives. PHYSICAL EXAM:      BP (!) 142/90 (Site: Left Upper Arm, Position: Sitting, Cuff Size: Large Adult)   Pulse 74   Resp 16   Ht 5' 4\" (1.626 m)   Wt 159 lb 9.6 oz (72.4 kg)   LMP 09/28/2005   BMI 27.40 kg/m²    Constitutional and General Appearance: alert, cooperative, no distress and appears stated age  HEENT: PERRL, no cervical lymphadenopathy. No masses palpable. Normal oral mucosa  Respiratory:  Normal excursion and expansion without use of accessory muscles  Resp Auscultation: Good respiratory effort. No for increased work of breathing.  On auscultation: clear to auscultation bilaterally  Cardiovascular:  Heart tones are crisp and normal. regular S1 and S2.  Jugular venous pulsation Normal  The carotid upstroke is normal in amplitude and contour without delay or bruit   Abdomen:   soft  Bowel sounds present  Extremities:   No edema  Neurological:  Alert and oriented. Cardiac Data:  EKG: SR, inferior infarction    Labs:     CBC: No results for input(s): WBC, HGB, HCT, PLT in the last 72 hours. BMP: No results for input(s): NA, K, CO2, BUN, CREATININE, LABGLOM, GLUCOSE in the last 72 hours. PT/INR: No results for input(s): PROTIME, INR in the last 72 hours. FASTING LIPID PANEL:  Lab Results   Component Value Date/Time    HDL 71 01/06/2022 09:15 AM    TRIG 71 01/06/2022 09:15 AM     LIVER PROFILE:No results for input(s): AST, ALT, LABALBU in the last 72 hours. Problem List:  Patient Active Problem List   Diagnosis    Dyslipidemia    Menopausal syndrome    Vitamin D deficiency    Hypothyroidism    Impaired glucose tolerance    Osteopenia    Bell's palsy    Facial droop        TTE 1/23/19  Summary  Normal left ventricle size, wall thickness and function with an estimated EF > 55%. No segmental wall motion abnormalities seen. Grade I (mild) left ventricular diastolic dysfunction. Normal mitral valve structure. Mild mitral regurgitation. Normal tricuspid valve leaflets. Mild tricuspid regurgitation. Estimated right ventricular systolic pressure is 26 mmHg. No significant pericardial effusion is seen    Nuclear stress test 2/11/19    IMPRESSION:  1. Normal perfusion. 2. EF 70%. 3. Low risk. Assessment and plan:    -Abnormal ECG- Dyspnea on exertion. I will obtain treadmill cardiolite stress test and TTE.  -Dizziness- referred to ENT. She reports sporadic palpitations for few seconds. This is not enough at this time to order holter or event monitoring.  -Hyperlipidemia- continue statin. -RTC 6 months.     Anita Dow [facility]  [phone]

## 2022-08-09 ENCOUNTER — HOSPITAL ENCOUNTER (OUTPATIENT)
Dept: NON INVASIVE DIAGNOSTICS | Age: 67
Discharge: HOME | End: 2022-08-09
Payer: MEDICARE

## 2022-08-09 ENCOUNTER — HOSPITAL ENCOUNTER (OUTPATIENT)
Dept: NUCLEAR MEDICINE | Age: 67
Discharge: HOME | End: 2022-08-11
Payer: MEDICARE

## 2022-08-09 ENCOUNTER — TELEPHONE (OUTPATIENT)
Dept: CARDIOLOGY | Age: 67
End: 2022-08-09

## 2022-08-09 DIAGNOSIS — R94.31 ABNORMAL ECG: ICD-10-CM

## 2022-08-09 DIAGNOSIS — R06.09 DYSPNEA ON EXERTION: ICD-10-CM

## 2022-08-09 LAB
LV EF: 65 %
LVEF MODALITY: NORMAL

## 2022-08-09 PROCEDURE — 93018 CV STRESS TEST I&R ONLY: CPT | Performed by: INTERNAL MEDICINE

## 2022-08-09 PROCEDURE — 93017 CV STRESS TEST TRACING ONLY: CPT

## 2022-08-09 PROCEDURE — A9500 TC99M SESTAMIBI: HCPCS | Performed by: INTERNAL MEDICINE

## 2022-08-09 PROCEDURE — 93306 TTE W/DOPPLER COMPLETE: CPT

## 2022-08-09 PROCEDURE — 3430000000 HC RX DIAGNOSTIC RADIOPHARMACEUTICAL: Performed by: INTERNAL MEDICINE

## 2022-08-09 PROCEDURE — 78452 HT MUSCLE IMAGE SPECT MULT: CPT

## 2022-08-09 RX ADMIN — TETRAKIS(2-METHOXYISOBUTYLISOCYANIDE)COPPER(I) TETRAFLUOROBORATE 10 MILLICURIE: 1 INJECTION, POWDER, LYOPHILIZED, FOR SOLUTION INTRAVENOUS at 10:52

## 2022-08-09 RX ADMIN — TETRAKIS(2-METHOXYISOBUTYLISOCYANIDE)COPPER(I) TETRAFLUOROBORATE 30 MILLICURIE: 1 INJECTION, POWDER, LYOPHILIZED, FOR SOLUTION INTRAVENOUS at 10:52

## 2022-08-09 NOTE — PROCEDURES
[facility]                 [address] [place] [id number]                              CARDIAC STRESS TEST    PATIENT NAME: Merlinda Bradley                   :        1955  MED REC NO:   [id number]                             ROOM:  ACCOUNT NO:   [de-identified]                           ADMIT DATE: 2022  PROVIDER:     Zia Seals    DATE OF STUDY:  2022    STRESS TEST    Ordering Provider:  Maranda Tse MD    Primary Care Provider:  MARTÍNEZ Guerra    Patient's Age: 69     Height: 5 feet 4 inches  Weight: 159 pounds    INDICATION:  Abnormal ECG, dyspnea on exertion. Predicted Heart Rate: Maximum:  151    85%:  129    Heart Rate Restin   Standin    Blood pressure restin/78   Standin/78    Exercise protocol used:  Agapito  Exercise Duration/Stage: 3:00; stage I    Test terminated due to:  Shortness of breath. Imaging: Cardiolite    Maximum heart rate:  166 (109%)  Max blood pressure:  186/64  Max Workload:  4.60 METS    Chest Pain:  No    Baseline EKG:  Normal sinus rhythm. EKG changes:  None. Leads with maximum changes:  None. EKG returned to baseline 1-2 minutes in recovery. Arrhythmias:  None. Interpretation:  1. Normal ECG portion of stress test.  2.  Nuclear perfusion scan reports to follow. 3.  Below average functional capacity at 4.6 METS.     Nuclear Myocardial Perfusion Imaging (SPECT)    TESTING METHOD  STRESS:   Treadmill  AGENT:    Cardiolite  REST:          Injection Date:  2022  Time:  0940  Amount:  10.2  mCi  STRESS:   Injection Date:  2022  Time:  1110  Amount:  30.2 mCi  IMAGE TIME:    Rest: 1025  Stress:  1150    EF:  73%  TID:  0.99  LHR:  0.47    FINDINGS:  Ischemia (Reversible Defect):           No  Infarction (Irreversible Defect):       No  Normal Ejection Fraction:               Yes, 73%  Normal Segmental Wall Motion:           Yes    Low risk study.    IMPRESSION:  1. Normal perfusion. 2.  EF 73%.       Zahraa Malloy    D: 08/09/2022 16:46:02       T: 08/09/2022 16:47:18     MA/LEONARD_EDIT  Job#: [id number]     Doc#: Unknown    CC:  Gurdeep Serna, APRN-CNP       Domingo Gibson MD

## 2022-08-09 NOTE — PROGRESS NOTES
Patient Name:  Long Adames MRN:  [id number]   :  1955  Age:  69 y.o. Sex: female     Ordering Provider: Yisel Cassidy MD  Primary Care Provider:  SCOTT Nixon - CNP     Indications: Abnormal EKG, ALAN   Medications:     Current Outpatient Medications:     NONFORMULARY, French Grape Seed Extract 400 mg daily, Disp: , Rfl:     Varenicline Tartrate (TYRVAYA) 0.03 MG/ACT SOLN, 1 spray by Nasal route in the morning and at bedtime, Disp: , Rfl:     tiZANidine (ZANAFLEX) 4 MG tablet, Take 1 tablet by mouth 3 times daily as needed (as needed for back spasms), Disp: 30 tablet, Rfl: 0    NONFORMULARY, Silymarin- one capsule daily, Disp: , Rfl:     rosuvastatin (CRESTOR) 5 MG tablet, TAKE 1 TABLET NIGHTLY, Disp: 90 tablet, Rfl: 3    Nutritional Supplements (SILICA PO), Take by mouth 448 mg- 3 tablets daily, Disp: , Rfl:     Barberry-Oreg Grape-Goldenseal (BERBERINE COMPLEX PO), Take 250 mg by mouth daily, Disp: , Rfl:     NONFORMULARY, [place] Cinnamon 1000 mg bid, Disp: , Rfl:     Turmeric (QC TUMERIC COMPLEX PO), Take 1,500 mg by mouth daily , Disp: , Rfl:     Multiple Vitamins-Minerals (PRESERVISION AREDS 2) CAPS, Take 1 capsule by mouth 2 times daily, Disp: , Rfl:     QUERCETIN PO, Take 2 capsules by mouth 2 times daily (800 mg each), with Bromeline 165 mg each, Disp: , Rfl:     vitamin D3 (CHOLECALCIFEROL) 25 MCG (1000 UT) TABS tablet, Take 1 tablet by mouth daily, Disp: 90 tablet, Rfl: 1    NONFORMULARY, Iodine 2%  - 6 drops orally every morning, Disp: , Rfl:     Omega-3 Fatty Acids (OMEGA 3 PO), Take 1,600 mg by mouth daily , Disp: , Rfl:     Coenzyme Q10 (CO Q-10) 300 MG CAPS, Take 1 capsule by mouth daily, Disp: , Rfl:     Calcium Carb-Cholecalciferol (CALCIUM 1000 + D PO), Take 1,000 mg by mouth daily , Disp: , Rfl:     CRANBERRY PO, Take 1,300 mg by mouth 2 times daily , Disp: , Rfl:     aspirin 81 MG tablet, Take 81 mg by mouth daily , Disp: , Rfl:     GARLIC, Take 682 mg by mouth daily. , Disp: , Rfl:     Lactobacillus (ACIDOPHILUS PO), Take by mouth PB8 acidophilus 1 gm daily. , Disp: , Rfl:     Ascorbic Acid (VITAMIN C) 1000 MG tablet, Take 1,000 mg by mouth daily. , Disp: , Rfl:     NONFORMULARY, Formula UVM 75 daily. Multivitamin with chelated minerals, Disp: , Rfl:     Methylsulfonylmethane (MSM PO), Take 3 tablets by mouth daily , Disp: , Rfl:      ------------------------------------------------------------------------------------------------    Predicted Heart Rate:  Maximum:  151   85%:  129     Heart Rate Restin   Standin     Blood Pressure Restin/78  Standin/78     Exercise Protocol Used:  MEREDITH     Exercise Duration/Stage:  3:00 (I)     Test terminated due to: SOB  Imaging: None or Cardiolite     Maximum Heart Rate:  166 (109%) Max BP: 186/64  Max Workload: 4.60 METS     Chest Pain: No                   Onset:  NA  Severity:  NA     Electronically signed by Bogdan Renteria RN on 22 at 11:10 AM EDT    -------------------------------------------------------------------------------------------------    Baseline EKG :  NSR    EKG Changes:  None  Onset:  Leads:      Maximum Change:   Up sloping:    Horizontal:   Down sloping:       Leads with maximum changes:  None     EKG returned to baseline 1-2 minutes in recovery. Arrhythmias: None     Interpretation:  1. Normal ECG portion of stress test.  2. Nuclear perfusion scan report to follow.    3. Below average functional capacity at 4.6 METS        Supervising Physician:  Greg Garcia,  DO

## 2022-08-10 ENCOUNTER — TELEPHONE (OUTPATIENT)
Dept: CARDIOLOGY | Age: 67
End: 2022-08-10

## 2022-08-10 NOTE — TELEPHONE ENCOUNTER
Notified patient of  stress results. Instructed patient to keep upcoming appointment with cardiologist and to call our office for any questions. Patient also instructed to utilize the E.D. for any emergent health issues that may arise.

## 2022-08-11 ENCOUNTER — TELEPHONE (OUTPATIENT)
Dept: CARDIOLOGY | Age: 67
End: 2022-08-11

## 2022-08-11 NOTE — TELEPHONE ENCOUNTER
I received a phone call that test results looked good. An EKG detected a left anterior fascicular block. Is this no longer a concern? The EKG also showed a heart attack but it doesnt say when I had it. Do I need a follow-up appointment with Dr. Hawk Bowens to discuss the EKG results versus the tests I recently had?   If there are no concerns with my heart anymore do I still need to keep my 6 month follow-up appointment with Dr. Hawk Bowens in February of next year

## 2022-08-17 NOTE — TELEPHONE ENCOUNTER
I spoke to pt. She is aware of echo and stress results. We discussed. She would like to cancel the 6 month appt in Feb and reschedule to an annual, as Dr. Dayron Sandoval recommended. Cardio Appt changed to 8/2/23.

## 2022-08-18 PROCEDURE — 93018 CV STRESS TEST I&R ONLY: CPT | Performed by: INTERNAL MEDICINE

## 2022-08-29 ENCOUNTER — TELEPHONE (OUTPATIENT)
Dept: INTERNAL MEDICINE | Age: 67
End: 2022-08-29

## 2022-08-29 ENCOUNTER — HOSPITAL ENCOUNTER (OUTPATIENT)
Dept: LAB | Age: 67
Discharge: HOME | End: 2022-08-29
Payer: MEDICARE

## 2022-08-29 DIAGNOSIS — R35.0 URINE FREQUENCY: Primary | ICD-10-CM

## 2022-08-29 DIAGNOSIS — R35.0 URINE FREQUENCY: ICD-10-CM

## 2022-08-29 LAB
BACTERIA: ABNORMAL
BILIRUBIN URINE: NEGATIVE
EPITHELIAL CELLS UA: ABNORMAL /HPF (ref 0–5)
GLUCOSE URINE: NEGATIVE
KETONES, URINE: NEGATIVE
LEUKOCYTE ESTERASE, URINE: ABNORMAL
NITRITE, URINE: NEGATIVE
PH UA: 7 (ref 5–6)
PROTEIN UA: NEGATIVE
RBC UA: ABNORMAL /HPF (ref 0–4)
SPECIFIC GRAVITY UA: 1.01 (ref 1.01–1.02)
URINE HGB: ABNORMAL
UROBILINOGEN, URINE: NORMAL
WBC UA: ABNORMAL /HPF (ref 0–4)

## 2022-08-29 PROCEDURE — 81001 URINALYSIS AUTO W/SCOPE: CPT

## 2022-08-29 RX ORDER — CEFUROXIME AXETIL 250 MG/1
250 TABLET ORAL 2 TIMES DAILY
Qty: 14 TABLET | Refills: 0 | Status: SHIPPED | OUTPATIENT
Start: 2022-08-29 | End: 2022-09-05

## 2022-08-29 NOTE — TELEPHONE ENCOUNTER
Patient called stating thinks she has a UTI has frequency, burning, itching. Put in order for urine. Would like script sent to [facility] [id number]. Coming in to get culture.

## 2022-09-22 DIAGNOSIS — E78.5 DYSLIPIDEMIA: ICD-10-CM

## 2022-09-22 RX ORDER — ROSUVASTATIN CALCIUM 5 MG/1
TABLET, COATED ORAL
Qty: 90 TABLET | Refills: 3 | Status: SHIPPED | OUTPATIENT
Start: 2022-09-22

## 2022-10-06 ENCOUNTER — IMMUNIZATION (OUTPATIENT)
Dept: LAB | Age: 67
End: 2022-10-06
Payer: MEDICARE

## 2022-10-06 PROCEDURE — G0008 ADMIN INFLUENZA VIRUS VAC: HCPCS | Performed by: NURSE PRACTITIONER

## 2022-10-06 PROCEDURE — PBSHW COVID-19, MODERNA BIVALENT BOOSTER, (AGE 18Y+), IM, 50 MCG/0.5 ML: Performed by: FAMILY MEDICINE

## 2022-10-06 PROCEDURE — 91313 COVID-19, MODERNA BIVALENT BOOSTER, (AGE 18Y+), IM, 50 MCG/0.5 ML: CPT | Performed by: FAMILY MEDICINE

## 2022-10-06 PROCEDURE — PBSHW INFLUENZA, FLUAD, (AGE 65 Y+), IM, PF, 0.5 ML: Performed by: NURSE PRACTITIONER

## 2022-10-20 ENCOUNTER — ANESTHESIA (OUTPATIENT)
Dept: OPERATING ROOM | Age: 67
End: 2022-10-20
Payer: MEDICARE

## 2022-10-20 ENCOUNTER — OFFICE VISIT (OUTPATIENT)
Dept: PRIMARY CARE CLINIC | Age: 67
End: 2022-10-20
Payer: MEDICARE

## 2022-10-20 ENCOUNTER — ANESTHESIA EVENT (OUTPATIENT)
Dept: OPERATING ROOM | Age: 67
End: 2022-10-20
Payer: MEDICARE

## 2022-10-20 ENCOUNTER — APPOINTMENT (OUTPATIENT)
Dept: CT IMAGING | Age: 67
End: 2022-10-20
Payer: MEDICARE

## 2022-10-20 ENCOUNTER — HOSPITAL ENCOUNTER (EMERGENCY)
Age: 67
Discharge: HOME | End: 2022-10-20
Attending: EMERGENCY MEDICINE | Admitting: INTERNAL MEDICINE
Payer: MEDICARE

## 2022-10-20 ENCOUNTER — HOSPITAL ENCOUNTER (OUTPATIENT)
Age: 67
Setting detail: OUTPATIENT SURGERY
Discharge: HOME | End: 2022-10-20
Attending: SURGERY | Admitting: SURGERY
Payer: MEDICARE

## 2022-10-20 VITALS
HEIGHT: 64 IN | HEART RATE: 72 BPM | OXYGEN SATURATION: 97 % | RESPIRATION RATE: 16 BRPM | BODY MASS INDEX: 27.21 KG/M2 | SYSTOLIC BLOOD PRESSURE: 115 MMHG | DIASTOLIC BLOOD PRESSURE: 70 MMHG | TEMPERATURE: 98.6 F | WEIGHT: 159.4 LBS

## 2022-10-20 VITALS
TEMPERATURE: 98.1 F | HEART RATE: 68 BPM | RESPIRATION RATE: 16 BRPM | SYSTOLIC BLOOD PRESSURE: 121 MMHG | DIASTOLIC BLOOD PRESSURE: 56 MMHG | OXYGEN SATURATION: 98 %

## 2022-10-20 DIAGNOSIS — K35.80 ACUTE APPENDICITIS, UNSPECIFIED ACUTE APPENDICITIS TYPE: ICD-10-CM

## 2022-10-20 DIAGNOSIS — K35.20 ACUTE APPENDICITIS WITH GENERALIZED PERITONITIS, WITHOUT ABSCESS, UNSPECIFIED WHETHER GANGRENE PRESENT, UNSPECIFIED WHETHER PERFORATION PRESENT: Primary | ICD-10-CM

## 2022-10-20 DIAGNOSIS — R10.31 RIGHT LOWER QUADRANT ABDOMINAL PAIN: Primary | ICD-10-CM

## 2022-10-20 DIAGNOSIS — G89.18 POST-OP PAIN: Primary | ICD-10-CM

## 2022-10-20 PROBLEM — K37 APPENDICITIS: Status: ACTIVE | Noted: 2022-10-20

## 2022-10-20 LAB
ABSOLUTE EOS #: 0.1 K/UL (ref 0–0.44)
ABSOLUTE IMMATURE GRANULOCYTE: 0.04 K/UL (ref 0–0.3)
ABSOLUTE LYMPH #: 1.19 K/UL (ref 1.1–3.7)
ABSOLUTE MONO #: 0.58 K/UL (ref 0.1–1.2)
ALBUMIN SERPL-MCNC: 3.9 G/DL (ref 3.5–5.2)
ALBUMIN/GLOBULIN RATIO: 1.3 (ref 1–2.5)
ALP BLD-CCNC: 94 U/L (ref 35–104)
ALT SERPL-CCNC: 15 U/L (ref 5–33)
ANION GAP SERPL CALCULATED.3IONS-SCNC: 8 MMOL/L (ref 9–17)
AST SERPL-CCNC: 19 U/L
BACTERIA: ABNORMAL
BASOPHILS # BLD: 0 % (ref 0–2)
BASOPHILS ABSOLUTE: 0.04 K/UL (ref 0–0.2)
BILIRUB SERPL-MCNC: 0.9 MG/DL (ref 0.3–1.2)
BILIRUBIN URINE: NEGATIVE
BUN BLDV-MCNC: 13 MG/DL (ref 8–23)
BUN/CREAT BLD: 19 (ref 9–20)
CALCIUM SERPL-MCNC: 9.4 MG/DL (ref 8.6–10.4)
CHLORIDE BLD-SCNC: 104 MMOL/L (ref 98–107)
CO2: 28 MMOL/L (ref 20–31)
CREAT SERPL-MCNC: 0.7 MG/DL (ref 0.5–0.9)
EOSINOPHILS RELATIVE PERCENT: 1 % (ref 1–4)
EPITHELIAL CELLS UA: ABNORMAL /HPF (ref 0–5)
GFR SERPL CREATININE-BSD FRML MDRD: >60 ML/MIN/1.73M2
GLUCOSE BLD-MCNC: 113 MG/DL (ref 70–99)
GLUCOSE URINE: NEGATIVE
HCT VFR BLD CALC: 43.5 % (ref 36.3–47.1)
HEMOGLOBIN: 14.9 G/DL (ref 11.9–15.1)
IMMATURE GRANULOCYTES: 0 %
KETONES, URINE: ABNORMAL
LEUKOCYTE ESTERASE, URINE: ABNORMAL
LYMPHOCYTES # BLD: 12 % (ref 24–43)
MCH RBC QN AUTO: 30 PG (ref 25.2–33.5)
MCHC RBC AUTO-ENTMCNC: 34.3 G/DL (ref 25.2–33.5)
MCV RBC AUTO: 87.5 FL (ref 82.6–102.9)
MONOCYTES # BLD: 6 % (ref 3–12)
NITRITE, URINE: NEGATIVE
NRBC AUTOMATED: 0 PER 100 WBC
PDW BLD-RTO: 12.8 % (ref 11.8–14.4)
PH UA: 6 (ref 5–6)
PLATELET # BLD: 208 K/UL (ref 138–453)
PMV BLD AUTO: 8.8 FL (ref 8.1–13.5)
POTASSIUM SERPL-SCNC: 3.7 MMOL/L (ref 3.7–5.3)
PROTEIN UA: NEGATIVE
RBC # BLD: 4.97 M/UL (ref 3.95–5.11)
RBC UA: ABNORMAL /HPF (ref 0–4)
SARS-COV-2, RAPID: NOT DETECTED
SEG NEUTROPHILS: 81 % (ref 36–65)
SEGMENTED NEUTROPHILS ABSOLUTE COUNT: 7.83 K/UL (ref 1.5–8.1)
SODIUM BLD-SCNC: 140 MMOL/L (ref 135–144)
SPECIFIC GRAVITY UA: 1.02 (ref 1.01–1.02)
SPECIMEN DESCRIPTION: NORMAL
TOTAL PROTEIN: 6.9 G/DL (ref 6.4–8.3)
URINE HGB: ABNORMAL
UROBILINOGEN, URINE: NORMAL
WBC # BLD: 9.8 K/UL (ref 3.5–11.3)
WBC UA: ABNORMAL /HPF (ref 0–4)

## 2022-10-20 PROCEDURE — 87635 SARS-COV-2 COVID-19 AMP PRB: CPT

## 2022-10-20 PROCEDURE — 7100000010 HC PHASE II RECOVERY - FIRST 15 MIN: Performed by: SURGERY

## 2022-10-20 PROCEDURE — 2580000003 HC RX 258: Performed by: NURSE ANESTHETIST, CERTIFIED REGISTERED

## 2022-10-20 PROCEDURE — 2580000003 HC RX 258: Performed by: EMERGENCY MEDICINE

## 2022-10-20 PROCEDURE — 7100000001 HC PACU RECOVERY - ADDTL 15 MIN: Performed by: SURGERY

## 2022-10-20 PROCEDURE — S2900 ROBOTIC SURGICAL SYSTEM: HCPCS | Performed by: SURGERY

## 2022-10-20 PROCEDURE — 88341 IMHCHEM/IMCYTCHM EA ADD ANTB: CPT

## 2022-10-20 PROCEDURE — 80053 COMPREHEN METABOLIC PANEL: CPT

## 2022-10-20 PROCEDURE — 96365 THER/PROPH/DIAG IV INF INIT: CPT

## 2022-10-20 PROCEDURE — 3600000009 HC SURGERY ROBOT BASE: Performed by: SURGERY

## 2022-10-20 PROCEDURE — 7100000011 HC PHASE II RECOVERY - ADDTL 15 MIN: Performed by: SURGERY

## 2022-10-20 PROCEDURE — 6360000002 HC RX W HCPCS: Performed by: NURSE ANESTHETIST, CERTIFIED REGISTERED

## 2022-10-20 PROCEDURE — 44970 LAPAROSCOPY APPENDECTOMY: CPT | Performed by: SURGERY

## 2022-10-20 PROCEDURE — 3700000000 HC ANESTHESIA ATTENDED CARE: Performed by: SURGERY

## 2022-10-20 PROCEDURE — 88342 IMHCHEM/IMCYTCHM 1ST ANTB: CPT

## 2022-10-20 PROCEDURE — 88304 TISSUE EXAM BY PATHOLOGIST: CPT

## 2022-10-20 PROCEDURE — 81001 URINALYSIS AUTO W/SCOPE: CPT

## 2022-10-20 PROCEDURE — 6360000002 HC RX W HCPCS: Performed by: SURGERY

## 2022-10-20 PROCEDURE — 3600000019 HC SURGERY ROBOT ADDTL 15MIN: Performed by: SURGERY

## 2022-10-20 PROCEDURE — 99220 PR INITIAL OBSERVATION CARE/DAY 70 MINUTES: CPT | Performed by: SURGERY

## 2022-10-20 PROCEDURE — 2500000003 HC RX 250 WO HCPCS: Performed by: NURSE ANESTHETIST, CERTIFIED REGISTERED

## 2022-10-20 PROCEDURE — 3700000001 HC ADD 15 MINUTES (ANESTHESIA): Performed by: SURGERY

## 2022-10-20 PROCEDURE — 74176 CT ABD & PELVIS W/O CONTRAST: CPT

## 2022-10-20 PROCEDURE — 99211 OFF/OP EST MAY X REQ PHY/QHP: CPT

## 2022-10-20 PROCEDURE — 2709999900 HC NON-CHARGEABLE SUPPLY: Performed by: SURGERY

## 2022-10-20 PROCEDURE — 7100000000 HC PACU RECOVERY - FIRST 15 MIN: Performed by: SURGERY

## 2022-10-20 PROCEDURE — 6360000002 HC RX W HCPCS: Performed by: EMERGENCY MEDICINE

## 2022-10-20 PROCEDURE — G0378 HOSPITAL OBSERVATION PER HR: HCPCS

## 2022-10-20 PROCEDURE — 99999 PR OFFICE/OUTPT VISIT,PROCEDURE ONLY: CPT | Performed by: STUDENT IN AN ORGANIZED HEALTH CARE EDUCATION/TRAINING PROGRAM

## 2022-10-20 PROCEDURE — 96361 HYDRATE IV INFUSION ADD-ON: CPT

## 2022-10-20 PROCEDURE — 85025 COMPLETE CBC W/AUTO DIFF WBC: CPT

## 2022-10-20 RX ORDER — OXYCODONE HYDROCHLORIDE 5 MG/1
5 TABLET ORAL PRN
Status: DISCONTINUED | OUTPATIENT
Start: 2022-10-20 | End: 2022-10-20 | Stop reason: HOSPADM

## 2022-10-20 RX ORDER — PHENYLEPHRINE HYDROCHLORIDE 10 MG/ML
INJECTION INTRAVENOUS PRN
Status: DISCONTINUED | OUTPATIENT
Start: 2022-10-20 | End: 2022-10-20 | Stop reason: SDUPTHER

## 2022-10-20 RX ORDER — PROPOFOL 10 MG/ML
INJECTION, EMULSION INTRAVENOUS PRN
Status: DISCONTINUED | OUTPATIENT
Start: 2022-10-20 | End: 2022-10-20 | Stop reason: SDUPTHER

## 2022-10-20 RX ORDER — OXYCODONE HYDROCHLORIDE 5 MG/1
10 TABLET ORAL PRN
Status: DISCONTINUED | OUTPATIENT
Start: 2022-10-20 | End: 2022-10-20 | Stop reason: HOSPADM

## 2022-10-20 RX ORDER — SODIUM CHLORIDE, SODIUM LACTATE, POTASSIUM CHLORIDE, CALCIUM CHLORIDE 600; 310; 30; 20 MG/100ML; MG/100ML; MG/100ML; MG/100ML
INJECTION, SOLUTION INTRAVENOUS CONTINUOUS PRN
Status: DISCONTINUED | OUTPATIENT
Start: 2022-10-20 | End: 2022-10-20 | Stop reason: SDUPTHER

## 2022-10-20 RX ORDER — SODIUM CHLORIDE 9 MG/ML
INJECTION, SOLUTION INTRAVENOUS CONTINUOUS PRN
Status: DISCONTINUED | OUTPATIENT
Start: 2022-10-20 | End: 2022-10-20 | Stop reason: SDUPTHER

## 2022-10-20 RX ORDER — FENTANYL CITRATE 50 UG/ML
INJECTION, SOLUTION INTRAMUSCULAR; INTRAVENOUS PRN
Status: DISCONTINUED | OUTPATIENT
Start: 2022-10-20 | End: 2022-10-20 | Stop reason: SDUPTHER

## 2022-10-20 RX ORDER — HYDROCODONE BITARTRATE AND ACETAMINOPHEN 5; 325 MG/1; MG/1
1 TABLET ORAL EVERY 6 HOURS PRN
Qty: 20 TABLET | Refills: 0 | Status: SHIPPED | OUTPATIENT
Start: 2022-10-20 | End: 2022-10-25

## 2022-10-20 RX ORDER — KETOROLAC TROMETHAMINE 30 MG/ML
INJECTION, SOLUTION INTRAMUSCULAR; INTRAVENOUS PRN
Status: DISCONTINUED | OUTPATIENT
Start: 2022-10-20 | End: 2022-10-20 | Stop reason: SDUPTHER

## 2022-10-20 RX ORDER — SODIUM CHLORIDE 9 MG/ML
INJECTION, SOLUTION INTRAVENOUS PRN
Status: DISCONTINUED | OUTPATIENT
Start: 2022-10-20 | End: 2022-10-20 | Stop reason: HOSPADM

## 2022-10-20 RX ORDER — 0.9 % SODIUM CHLORIDE 0.9 %
1000 INTRAVENOUS SOLUTION INTRAVENOUS ONCE
Status: COMPLETED | OUTPATIENT
Start: 2022-10-20 | End: 2022-10-20

## 2022-10-20 RX ORDER — LIDOCAINE HYDROCHLORIDE 20 MG/ML
INJECTION, SOLUTION EPIDURAL; INFILTRATION; INTRACAUDAL; PERINEURAL PRN
Status: DISCONTINUED | OUTPATIENT
Start: 2022-10-20 | End: 2022-10-20 | Stop reason: SDUPTHER

## 2022-10-20 RX ORDER — ROCURONIUM BROMIDE 10 MG/ML
INJECTION, SOLUTION INTRAVENOUS PRN
Status: DISCONTINUED | OUTPATIENT
Start: 2022-10-20 | End: 2022-10-20 | Stop reason: SDUPTHER

## 2022-10-20 RX ORDER — ONDANSETRON 2 MG/ML
INJECTION INTRAMUSCULAR; INTRAVENOUS PRN
Status: DISCONTINUED | OUTPATIENT
Start: 2022-10-20 | End: 2022-10-20 | Stop reason: SDUPTHER

## 2022-10-20 RX ORDER — SODIUM CHLORIDE 0.9 % (FLUSH) 0.9 %
5-40 SYRINGE (ML) INJECTION EVERY 12 HOURS SCHEDULED
Status: DISCONTINUED | OUTPATIENT
Start: 2022-10-20 | End: 2022-10-20 | Stop reason: HOSPADM

## 2022-10-20 RX ORDER — MIDAZOLAM HYDROCHLORIDE 1 MG/ML
INJECTION INTRAMUSCULAR; INTRAVENOUS PRN
Status: DISCONTINUED | OUTPATIENT
Start: 2022-10-20 | End: 2022-10-20 | Stop reason: SDUPTHER

## 2022-10-20 RX ORDER — SODIUM CHLORIDE 0.9 % (FLUSH) 0.9 %
5-40 SYRINGE (ML) INJECTION PRN
Status: DISCONTINUED | OUTPATIENT
Start: 2022-10-20 | End: 2022-10-20 | Stop reason: HOSPADM

## 2022-10-20 RX ORDER — KETOROLAC TROMETHAMINE 30 MG/ML
15 INJECTION, SOLUTION INTRAMUSCULAR; INTRAVENOUS ONCE
Status: DISCONTINUED | OUTPATIENT
Start: 2022-10-20 | End: 2022-10-20 | Stop reason: HOSPADM

## 2022-10-20 RX ORDER — METOPROLOL TARTRATE 5 MG/5ML
INJECTION INTRAVENOUS PRN
Status: DISCONTINUED | OUTPATIENT
Start: 2022-10-20 | End: 2022-10-20 | Stop reason: SDUPTHER

## 2022-10-20 RX ORDER — MORPHINE SULFATE 2 MG/ML
1 INJECTION, SOLUTION INTRAMUSCULAR; INTRAVENOUS EVERY 5 MIN PRN
Status: DISCONTINUED | OUTPATIENT
Start: 2022-10-20 | End: 2022-10-20 | Stop reason: HOSPADM

## 2022-10-20 RX ORDER — DEXAMETHASONE SODIUM PHOSPHATE 10 MG/ML
INJECTION INTRAMUSCULAR; INTRAVENOUS PRN
Status: DISCONTINUED | OUTPATIENT
Start: 2022-10-20 | End: 2022-10-20 | Stop reason: SDUPTHER

## 2022-10-20 RX ORDER — MORPHINE SULFATE 2 MG/ML
2 INJECTION, SOLUTION INTRAMUSCULAR; INTRAVENOUS EVERY 5 MIN PRN
Status: DISCONTINUED | OUTPATIENT
Start: 2022-10-20 | End: 2022-10-20 | Stop reason: HOSPADM

## 2022-10-20 RX ORDER — ONDANSETRON 2 MG/ML
4 INJECTION INTRAMUSCULAR; INTRAVENOUS
Status: DISCONTINUED | OUTPATIENT
Start: 2022-10-20 | End: 2022-10-20 | Stop reason: HOSPADM

## 2022-10-20 RX ORDER — SODIUM CHLORIDE 9 MG/ML
INJECTION, SOLUTION INTRAVENOUS CONTINUOUS
Status: DISCONTINUED | OUTPATIENT
Start: 2022-10-20 | End: 2022-10-20 | Stop reason: HOSPADM

## 2022-10-20 RX ADMIN — SODIUM CHLORIDE 1000 ML: 9 INJECTION, SOLUTION INTRAVENOUS at 09:12

## 2022-10-20 RX ADMIN — PHENYLEPHRINE HYDROCHLORIDE 300 MCG: 10 INJECTION INTRAVENOUS at 17:00

## 2022-10-20 RX ADMIN — DEXAMETHASONE SODIUM PHOSPHATE 10 MG: 10 INJECTION INTRAMUSCULAR; INTRAVENOUS at 16:47

## 2022-10-20 RX ADMIN — LIDOCAINE HYDROCHLORIDE 100 MG: 20 INJECTION, SOLUTION EPIDURAL; INFILTRATION; INTRACAUDAL; PERINEURAL at 16:42

## 2022-10-20 RX ADMIN — SODIUM CHLORIDE: 9 INJECTION, SOLUTION INTRAVENOUS at 16:33

## 2022-10-20 RX ADMIN — ONDANSETRON 4 MG: 2 INJECTION INTRAMUSCULAR; INTRAVENOUS at 16:47

## 2022-10-20 RX ADMIN — MIDAZOLAM HYDROCHLORIDE 2 MG: 1 INJECTION, SOLUTION INTRAMUSCULAR; INTRAVENOUS at 16:33

## 2022-10-20 RX ADMIN — SODIUM CHLORIDE, POTASSIUM CHLORIDE, SODIUM LACTATE AND CALCIUM CHLORIDE: 600; 310; 30; 20 INJECTION, SOLUTION INTRAVENOUS at 17:29

## 2022-10-20 RX ADMIN — PHENYLEPHRINE HYDROCHLORIDE 200 MCG: 10 INJECTION INTRAVENOUS at 16:53

## 2022-10-20 RX ADMIN — Medication 2000 MG: at 16:50

## 2022-10-20 RX ADMIN — KETOROLAC TROMETHAMINE 30 MG: 30 INJECTION, SOLUTION INTRAMUSCULAR at 17:40

## 2022-10-20 RX ADMIN — ROCURONIUM BROMIDE 50 MG: 10 INJECTION, SOLUTION INTRAVENOUS at 16:42

## 2022-10-20 RX ADMIN — SODIUM CHLORIDE 1000 ML: 9 INJECTION, SOLUTION INTRAVENOUS at 12:56

## 2022-10-20 RX ADMIN — PIPERACILLIN AND TAZOBACTAM 3375 MG: 3; .375 INJECTION, POWDER, FOR SOLUTION INTRAVENOUS at 12:59

## 2022-10-20 RX ADMIN — PHENYLEPHRINE HYDROCHLORIDE 200 MCG: 10 INJECTION INTRAVENOUS at 16:49

## 2022-10-20 RX ADMIN — PROPOFOL 140 MG: 10 INJECTION, EMULSION INTRAVENOUS at 16:42

## 2022-10-20 RX ADMIN — FENTANYL CITRATE 100 MCG: 50 INJECTION, SOLUTION INTRAMUSCULAR; INTRAVENOUS at 16:33

## 2022-10-20 RX ADMIN — SUGAMMADEX 200 MG: 100 INJECTION, SOLUTION INTRAVENOUS at 17:32

## 2022-10-20 RX ADMIN — METOPROLOL TARTRATE 5 MG: 5 INJECTION INTRAVENOUS at 16:47

## 2022-10-20 ASSESSMENT — PAIN DESCRIPTION - LOCATION: LOCATION: ABDOMEN

## 2022-10-20 ASSESSMENT — ENCOUNTER SYMPTOMS
DIARRHEA: 1
VOMITING: 1
NAUSEA: 1
ABDOMINAL PAIN: 1
ABDOMINAL DISTENTION: 1
SORE THROAT: 0
SHORTNESS OF BREATH: 0

## 2022-10-20 ASSESSMENT — PAIN SCALES - GENERAL
PAINLEVEL_OUTOF10: 2

## 2022-10-20 ASSESSMENT — PAIN - FUNCTIONAL ASSESSMENT: PAIN_FUNCTIONAL_ASSESSMENT: NONE - DENIES PAIN

## 2022-10-20 ASSESSMENT — PAIN DESCRIPTION - PAIN TYPE: TYPE: SURGICAL PAIN

## 2022-10-20 NOTE — ED PROVIDER NOTES
[address] [facility] ED  [address]  [place] [address]  Phone: [phone]        [facility] [place] ED  EMERGENCY DEPARTMENT ENCOUNTER      Pt Name: Kody Hobson  MRN: [id number]  [place] 1955  Date of evaluation: 10/20/2022  Provider: Dori Wilkinson DO    CHIEF COMPLAINT       Chief Complaint   Patient presents with    Abdominal Pain         HISTORY OF PRESENT ILLNESS   (Location/Symptom, Timing/Onset,Context/Setting, Quality, Duration, Modifying Factors, Severity)  Note limiting factors. Kody Hobson is a 69 y.o. female who presents to the emergency department for the evaluation of abdominal pain. Patient states that she was having cramping and bloating style abdominal pain last night. This was associated with multiple bowel movements, however, they were of normal consistency. She also had some vomiting that she states it seemed like undigested food. She states everything is improved today but she still feels a little bloated and has no appetite. She has no history of abdominal issues, no history of any surgeries on her abdomen, no history of obstruction. There is no blood in her vomit or stool. She denies dysuria or hematuria. Again, she states things have mostly improved today, however, just feels like little appetite with bloating    Nursing Notes were reviewed. REVIEW OF SYSTEMS    (2-9systems for level 4, 10 or more for level 5)     Review of Systems   Constitutional:  Positive for appetite change. Negative for fever. HENT:  Negative for sore throat. Respiratory:  Negative for shortness of breath. Cardiovascular:  Negative for chest pain. Gastrointestinal:  Positive for abdominal distention, abdominal pain, diarrhea, nausea and vomiting. Genitourinary:  Negative for dysuria. Skin:  Negative for rash. Neurological:  Negative for weakness. All other systems reviewed and are negative.     Except asnoted above the remainder of the review of systems was reviewed and negative. PAST MEDICAL HISTORY     Past Medical History:   Diagnosis Date    Hypercholesterolemia     Hypothyroidism     Subacute, not currently on treeatment. Impaired glucose tolerance     Prediabetes/stable and checking A1C yearly    Macular degeneration     Menopausal syndrome     Mitral regurgitation     Echo 1/19 mild MR    Osteopenia     Dexa 8/18 FRAX 1% at hip, T -1.5 Hip    Vitamin D deficiency          SURGICAL HISTORY       Past Surgical History:   Procedure Laterality Date    BLADDER SUSPENSION      In the past for a cystocele. BREAST BIOPSY Right     Negative. COLONOSCOPY N/A 3/12/2019    COLONOSCOPY performed by Kaci May DO at [address] [place] [address]      Was done due to a cystocele several years ago and was negagtive. DILATION AND CURETTAGE OF UTERUS      With hysteroscopy. ENDOMETRIAL ABLATION      At age 44. SIGMOIDOSCOPY      At age 52. Was negative per patien, although she did not have a followup barium enema with this for unknown reasons. TUBAL LIGATION           CURRENT MEDICATIONS     Previous Medications    ASCORBIC ACID (VITAMIN C) 1000 MG TABLET    Take 1,000 mg by mouth daily. ASPIRIN 81 MG TABLET    Take 81 mg by mouth daily     BARBERRY-OREG GRAPE-GOLDENSEAL (BERBERINE COMPLEX PO)    Take 250 mg by mouth daily    CALCIUM CARB-CHOLECALCIFEROL (CALCIUM 1000 + D PO)    Take 1,000 mg by mouth daily     COENZYME Q10 (CO Q-10) 300 MG CAPS    Take 1 capsule by mouth daily    CRANBERRY PO    Take 1,300 mg by mouth 2 times daily     GARLIC    Take 468 mg by mouth daily. LACTOBACILLUS (ACIDOPHILUS PO)    Take by mouth PB8 acidophilus 1 gm daily. METHYLSULFONYLMETHANE (MSM PO)    Take 3 tablets by mouth daily     MULTIPLE VITAMINS-MINERALS (PRESERVISION AREDS 2) CAPS    Take 1 capsule by mouth 2 times daily    NONFORMULARY    Formula [facility] 75 daily.  Multivitamin with chelated minerals    NONFORMULARY    Iodine 2%  - 6 drops orally every morning    NONFORMULARY    [place] Cinnamon 1000 mg bid    NONFORMULARY    Silymarin- one capsule daily    NONFORMULARY    French Grape Seed Extract 400 mg daily    NUTRITIONAL SUPPLEMENTS (SILICA PO)    Take by mouth 448 mg- 3 tablets daily    OMEGA-3 FATTY ACIDS (OMEGA 3 PO)    Take 1,600 mg by mouth daily     QUERCETIN PO    Take 2 capsules by mouth 2 times daily (800 mg each), with Bromeline 165 mg each    ROSUVASTATIN (CRESTOR) 5 MG TABLET    TAKE 1 TABLET NIGHTLY    TIZANIDINE (ZANAFLEX) 4 MG TABLET    Take 1 tablet by mouth 3 times daily as needed (as needed for back spasms)    TURMERIC (QC TUMERIC COMPLEX PO)    Take 1,500 mg by mouth daily     VARENICLINE TARTRATE (TYRVAYA) 0.03 MG/ACT SOLN    1 spray by Nasal route in the morning and at bedtime    VITAMIN D3 (CHOLECALCIFEROL) 25 MCG (1000 UT) TABS TABLET    Take 1 tablet by mouth daily       ALLERGIES     Ciprofloxacin, Evista [raloxifene], Statins, Other, and Sulfa antibiotics    FAMILY HISTORY       Family History   Problem Relation Age of Onset    Alzheimer's Disease Mother     Diabetes Mother         Diet-controlled    Hypertension Father     High Cholesterol Father     Stroke Father     Coronary Art Dis Father          - arterial sclerosis    Heart Attack Father     High Cholesterol Brother     Cancer Brother     Other Child         Ankylosing spondylitis who takes Humira and sees rheumatology regularly. SOCIAL HISTORY       Social History     Socioeconomic History    Marital status:      Spouse name: None    Number of children: None    Years of education: None    Highest education level: None   Occupational History    Occupation: school counselor     Employer: [employer]   Tobacco Use    Smoking status: Never    Smokeless tobacco: Never    Tobacco comments:     exposed to 2nd hand smoke as child   Vaping Use    Vaping Use: Never used   Substance and Sexual Activity    Alcohol use:  Yes     Alcohol/week: 0.0 standard drinks     Comment: Half to 1 glass of red wine daily. Drug use: No       SCREENINGS    [place] Coma Scale  Eye Opening: Spontaneous  Best Verbal Response: Oriented  Best Motor Response: Obeys commands  [place] Coma Scale Score: 15        PHYSICAL EXAM    (up to 7 for level 4, 8 or more for level 5)     ED Triage Vitals [10/20/22 0855]   BP Temp Temp Source Heart Rate Resp SpO2 Height Weight   (!) 146/67 98.6 °F (37 °C) Tympanic 85 16 98 % 5' 4\" (1.626 m) 159 lb 6.4 oz (72.3 kg)       Physical Exam  Vitals and nursing note reviewed. Constitutional:       General: She is not in acute distress. Appearance: Normal appearance. She is not ill-appearing or toxic-appearing. HENT:      Head: Normocephalic and atraumatic. Nose: Nose normal. No congestion. Mouth/Throat:      Comments: dry  Eyes:      General:         Right eye: No discharge. Left eye: No discharge. Conjunctiva/sclera: Conjunctivae normal.   Cardiovascular:      Rate and Rhythm: Normal rate and regular rhythm. Pulses: Normal pulses. Heart sounds: Normal heart sounds. No murmur heard. Pulmonary:      Effort: Pulmonary effort is normal. No respiratory distress. Breath sounds: Normal breath sounds. No wheezing. Abdominal:      General: Abdomen is flat. There is no distension. Palpations: Abdomen is soft. There is no pulsatile mass. Tenderness: no abdominal tenderness There is no guarding or rebound. Negative signs include Trejo's sign. Comments: No pulsatile mass. On my physical exam I did not appreciate any obvious abnormalities. There was no obvious distention, no rigidity, no guarding, no focal tenderness   Musculoskeletal:         General: No deformity or signs of injury. Normal range of motion. Cervical back: Normal range of motion. Skin:     General: Skin is warm and dry. Capillary Refill: Capillary refill takes less than 2 seconds. Findings: No rash.    Neurological: General: No focal deficit present. Mental Status: She is alert. Mental status is at baseline. Motor: No weakness. Comments: Speaking normally. No facial asymmetry. Moving all 4 extremities. Normal gait. Psychiatric:         Mood and Affect: Mood normal.       EMERGENCY DEPARTMENT COURSE and DIFFERENTIAL DIAGNOSIS/MDM:   Vitals:    Vitals:    10/20/22 0855   BP: (!) 146/67   Pulse: 85   Resp: 16   Temp: 98.6 °F (37 °C)   TempSrc: Tympanic   SpO2: 98%   Weight: 159 lb 6.4 oz (72.3 kg)   Height: 5' 4\" (1.626 m)       Patient presents to the emergency department with the complaint described above. Vital signs are grossly normal, patient nontoxic, resting comfortably, no distress. At this time the differential diagnosis is broad, however, I am going to obtain routine blood work, urinalysis and a CT of the abdomen and pelvis evaluated. DIAGNOSTIC RESULTS     LABS:  Labs Reviewed   CBC WITH AUTO DIFFERENTIAL - Abnormal; Notable for the following components:       Result Value    MCHC 34.3 (*)     Seg Neutrophils 81 (*)     Lymphocytes 12 (*)     All other components within normal limits   COMPREHENSIVE METABOLIC PANEL - Abnormal; Notable for the following components:    Glucose 113 (*)     Anion Gap 8 (*)     All other components within normal limits   URINALYSIS WITH REFLEX TO CULTURE - Abnormal; Notable for the following components:    Ketones, Urine TRACE (*)     Urine Hgb 1+ (*)     Leukocyte Esterase, Urine 2+ (*)     All other components within normal limits   MICROSCOPIC URINALYSIS - Abnormal; Notable for the following components:    Bacteria, UA TRACE (*)     All other components within normal limits       All other labs were within normal range or not returned as of this dictation. RADIOLOGY:  CT ABDOMEN PELVIS WO CONTRAST Additional Contrast? None   Final Result   Non air/fluid-filled appendix with mild thickening and some periappendiceal   fat stranding.   Mild uncomplicated appendicitis should be considered in the   appropriate clinical setting. Multifocal diverticulosis without CT evidence diverticulitis. No bowel   obstruction. No abnormal fluid collection, pathologic free fluid or free air. Borderline-mild hepatic steatosis. 1.9 cm left ovarian cyst.  No follow-up required. Moderate HH. Additional likely incidental findings, as above. ED Course as of 10/20/22 1040   Thu Oct 20, 2022   1008 Laboratory results show a normal CBC and a normal CMP, urinalysis is also grossly unremarkable, small amount of leukocyte Estrace but no WBCs [TS]   1022 CT reveals evidence of possible mild early appendicitis so I did contact Dr. Rommel Mejia on-call for general surgery [TS]   1038 Evaluation, patient does have pain in the right lower quadrant with mild tenderness. I do feel this most likely represents an appendicitis and we are going to admit for further evaluation and treatment [TS]      ED Course User Index  [TS] Santos Lakhani DO         PROCEDURES:  Unless otherwise noted below, none     Procedures    FINAL IMPRESSION      1. Acute appendicitis with generalized peritonitis, without abscess, unspecified whether gangrene present, unspecified whether perforation present          DISPOSITION/PLAN   DISPOSITION Decision To Admit 10/20/2022 10:40:18 AM      PATIENT REFERRED TO:  No follow-up provider specified.     DISCHARGE MEDICATIONS:  New Prescriptions    No medications on file          (Please note that portions of this note were completed with a voice recognition program.  Efforts were made to edit the dictations but occasionally words are mis-transcribed.)    Santos Lakhani DO,(electronically signed)  Board Certified Emergency Physician         Santos Lakhani DO  10/20/22 1041

## 2022-10-20 NOTE — ED NOTES
RN supervisor notified of admission. States that PCU is currently discharge dependent. No bed available at this time.      Arnoldo Bone RN  10/20/22 7737

## 2022-10-20 NOTE — ED NOTES
Patient reports no solid food since 4PM yesterday, She did have tea this morning at approximately 0900. Patient to be NPO per Dr. Marco Ochoa for possible appendectomy later this afternoon.      Sarah Orozco RN  10/20/22 6043 [facility] Geovani Kowalski RN  10/20/22 6727

## 2022-10-20 NOTE — H&P
General Surgery   History and Physical      PATIENT NAME: Qian Calvillo   YOB: 1955    ADMISSION DATE: 10/20/2022  2:37 PM      TODAY'S DATE: 10/20/2022    CHIEF COMPLAINT:  Acute appendicitis      HISTORY OF PRESENT ILLNESS:  The patient is a 69 y.o. female  who presents with approx 1 day of worsening abdominal pain. Pt reports yesterday she began to have mid abdominal pain that she describes as cramping and bloating. The pain persisted overnight and seemed to be more in the lower abdomen today. During this time she has had some nausea and emesis. Had more frequent bowel movements but not diarrhea. She reports she stopped eating yesterday due to loss of appetite. Overnight pain persisted and she came in today for evaluation. In the ER she had work up showing mostly normal lab work. CT scan showed no air or fluid in the appendix with some surrounding inflammatory changes. She does report pain seems to have migrated to RLQ since she came to ER. Past Medical History:        Diagnosis Date    Hypercholesterolemia     Hypothyroidism     Subacute, not currently on treeatment. Impaired glucose tolerance     Prediabetes/stable and checking A1C yearly    Macular degeneration     Menopausal syndrome     Mitral regurgitation     Echo 1/19 mild MR    Osteopenia     Dexa 8/18 FRAX 1% at hip, T -1.5 Hip    Vitamin D deficiency        Past Surgical History:        Procedure Laterality Date    BLADDER SUSPENSION      In the past for a cystocele. BREAST BIOPSY Right     Negative. COLONOSCOPY N/A 3/12/2019    COLONOSCOPY performed by Oliverio Mendoza DO at [address] [facility]      Was done due to a cystocele several years ago and was negagtive. DILATION AND CURETTAGE OF UTERUS      With hysteroscopy. ENDOMETRIAL ABLATION      At age 44. SIGMOIDOSCOPY      At age 52. Was negative per patien, although she did not have a followup barium enema with this for unknown reasons.     TUBAL LIGATION         Medications Prior to Admission:   Medications Prior to Admission: rosuvastatin (CRESTOR) 5 MG tablet, TAKE 1 TABLET NIGHTLY  NONFORMULARY, French Grape Seed Extract 400 mg daily  Varenicline Tartrate (TYRVAYA) 0.03 MG/ACT SOLN, 1 spray by Nasal route in the morning and at bedtime  tiZANidine (ZANAFLEX) 4 MG tablet, Take 1 tablet by mouth 3 times daily as needed (as needed for back spasms)  NONFORMULARY, Silymarin- one capsule daily  Nutritional Supplements (SILICA PO), Take by mouth 448 mg- 3 tablets daily  Barberry-Oreg Grape-Goldenseal (BERBERINE COMPLEX PO), Take 250 mg by mouth daily  NONFORMULARY, [place] Cinnamon 1000 mg bid  Turmeric (QC TUMERIC COMPLEX PO), Take 1,500 mg by mouth daily   Multiple Vitamins-Minerals (PRESERVISION AREDS 2) CAPS, Take 1 capsule by mouth 2 times daily  QUERCETIN PO, Take 2 capsules by mouth 2 times daily (800 mg each), with Bromeline 165 mg each  vitamin D3 (CHOLECALCIFEROL) 25 MCG (1000 UT) TABS tablet, Take 1 tablet by mouth daily  NONFORMULARY, Iodine 2%  - 6 drops orally every morning  Omega-3 Fatty Acids (OMEGA 3 PO), Take 1,600 mg by mouth daily   Coenzyme Q10 (CO Q-10) 300 MG CAPS, Take 1 capsule by mouth daily  Calcium Carb-Cholecalciferol (CALCIUM 1000 + D PO), Take 1,000 mg by mouth daily   CRANBERRY PO, Take 1,300 mg by mouth 2 times daily   aspirin 81 MG tablet, Take 81 mg by mouth daily   GARLIC, Take 627 mg by mouth daily. Lactobacillus (ACIDOPHILUS PO), Take by mouth PB8 acidophilus 1 gm daily. Ascorbic Acid (VITAMIN C) 1000 MG tablet, Take 1,000 mg by mouth daily. NONFORMULARY, Formula [facility] 75 daily.  Multivitamin with chelated minerals  Methylsulfonylmethane (MSM PO), Take 3 tablets by mouth daily     Allergies:  Ciprofloxacin, Evista [raloxifene], Statins, Other, and Sulfa antibiotics    Social History:   Social History     Socioeconomic History    Marital status:      Spouse name: Not on file    Number of children: Not on file    Years of education: Not on file    Highest education level: Not on file   Occupational History    Occupation: school counselor     Employer: [employer]   Tobacco Use    Smoking status: Never    Smokeless tobacco: Never    Tobacco comments:     exposed to 2nd hand smoke as child   Vaping Use    Vaping Use: Never used   Substance and Sexual Activity    Alcohol use: Yes     Alcohol/week: 0.0 standard drinks     Comment: Half to 1 glass of red wine daily. Drug use: No    Sexual activity: Not on file   Other Topics Concern    Not on file   Social History Narrative    Not on file     Social Determinants of Health     Financial Resource Strain: Not on file   Food Insecurity: Not on file   Transportation Needs: Not on file   Physical Activity: Not on file   Stress: Not on file   Social Connections: Not on file   Intimate Partner Violence: Not on file   Housing Stability: Not on file       Family History:       Problem Relation Age of Onset    Alzheimer's Disease Mother     Diabetes Mother         Diet-controlled    Hypertension Father     High Cholesterol Father     Stroke Father     Coronary Art Dis Father          - arterial sclerosis    Heart Attack Father     High Cholesterol Brother     Cancer Brother     Other Child         Ankylosing spondylitis who takes Humira and sees rheumatology regularly. REVIEW OF SYSTEMS:    CONSTITUTIONAL:  No recent weight gain/loss. Energy level normal for pt. HEENT:  negative  CARDIOVASCULAR:  No chest pain  GASTROINTESTINAL:  No abdominal pain, nausea, or vomiting. No constipation/diarrhea. No rectal bleeding  GENITOURINARY:  No dysuria  HEMATOLOGIC/LYMPHATIC:  No easy bruising. No history of cancer  ENDOCRINE:  negative   Review of systems negative unless above.     PHYSICAL EXAM:    VITALS:  [facility] 2005   INTAKE/OUTPUT:     Intake/Output Summary (Last 24 hours) at 10/20/2022 1737  Last data filed at 10/20/2022 1729  Gross per 24 hour   Intake 750 ml   Output -- Net 750 ml       CONSTITUTIONAL:  awake, alert, not distressed and normal weight  ENT:  normocephalic/atraumatic, without obvious abnormality  NECK:  supple, symmetrical, trachea midline   LUNGS:  CTA bilaterally  CARDIOVASCULAR:  regular rate and rhythm and No Murmur  ABDOMEN: soft,  non distended, tender to palpation in lower abdomen with more intense pain on palpation in RLQ with voluntary guarding, bowel sounds present.   MUSCULOSKELETAL:  negative, there is not obvious somatic dysfunction  NEUROLOGIC:  Mental Status Exam:  Level of Alertness:   alert  Orientation:   oriented to person, place, and time    CBC:   Lab Results   Component Value Date/Time    WBC 9.8 10/20/2022 09:09 AM    RBC 4.97 10/20/2022 09:09 AM    HGB 14.9 10/20/2022 09:09 AM    HCT 43.5 10/20/2022 09:09 AM    MCV 87.5 10/20/2022 09:09 AM    MCH 30.0 10/20/2022 09:09 AM    MCHC 34.3 10/20/2022 09:09 AM    RDW 12.8 10/20/2022 09:09 AM     10/20/2022 09:09 AM    MPV 8.8 10/20/2022 09:09 AM     CMP:    Lab Results   Component Value Date/Time     10/20/2022 09:09 AM    K 3.7 10/20/2022 09:09 AM     10/20/2022 09:09 AM    CO2 28 10/20/2022 09:09 AM    BUN 13 10/20/2022 09:09 AM    CREATININE 0.70 10/20/2022 09:09 AM    GFRAA >60 07/07/2022 10:09 AM    LABGLOM >60 10/20/2022 09:09 AM    GLUCOSE 113 10/20/2022 09:09 AM    PROT 6.9 10/20/2022 09:09 AM    LABALBU 3.9 10/20/2022 09:09 AM    CALCIUM 9.4 10/20/2022 09:09 AM    BILITOT 0.9 10/20/2022 09:09 AM    ALKPHOS 94 10/20/2022 09:09 AM    AST 19 10/20/2022 09:09 AM    ALT 15 10/20/2022 09:09 AM     BMP:    Lab Results   Component Value Date/Time     10/20/2022 09:09 AM    K 3.7 10/20/2022 09:09 AM     10/20/2022 09:09 AM    CO2 28 10/20/2022 09:09 AM    BUN 13 10/20/2022 09:09 AM    LABALBU 3.9 10/20/2022 09:09 AM    CREATININE 0.70 10/20/2022 09:09 AM    CALCIUM 9.4 10/20/2022 09:09 AM    GFRAA >60 07/07/2022 10:09 AM    LABGLOM >60 10/20/2022 09:09 AM    GLUCOSE 113 10/20/2022 09:09 AM     Hepatic Function Panel:    Lab Results   Component Value Date/Time    ALKPHOS 94 10/20/2022 09:09 AM    ALT 15 10/20/2022 09:09 AM    AST 19 10/20/2022 09:09 AM    PROT 6.9 10/20/2022 09:09 AM    BILITOT 0.9 10/20/2022 09:09 AM    LABALBU 3.9 10/20/2022 09:09 AM       Pertinent Radiology:  CT abd/pel images reviewed and read noted. Inflammatory changes around appendix. No evidence of perforation. ASSESSMENT   Active Problems:    * No active hospital problems. *  Resolved Problems:    * No resolved hospital problems. *    Early acute appendicitis    PLAN    Have discussed suspected diagnosis with pt. Based on symptoms and CT findings highly suspicious for early acute appendicitis. Discussed management options including tx with abx vs surgical intervention. Pt would like to proceed with surgery. Will plan for robotic assisted lap appendectomy. Risks of the procedure including bleeding, infection, scarring, pain, damage to surrounding structures need for additional surgery, stump leak, conversion to open and anesthesia risks are discussed and consent obtained. Home after surgery if no perforation and no complications.   F/u in 1 week        Electronically signed by Rozann Kehr, DO  on 10/20/2022 at 5:37 PM

## 2022-10-20 NOTE — ANESTHESIA PRE PROCEDURE
Department of Anesthesiology  Preprocedure Note       Name:  Dustin Pires   Age:  69 y.o.  :  1955                                          MRN:  [id number]         Date:  10/20/2022      Surgeon: Kelly Farias):  Lucero Cobb DO    Procedure: Procedure(s):  Laparoscopic Robotic Assisted Appendectomy    Medications prior to admission:   Prior to Admission medications    Medication Sig Start Date End Date Taking?  Authorizing Provider   rosuvastatin (CRESTOR) 5 MG tablet TAKE 1 TABLET NIGHTLY 22   SCOTT Garces CNP   [facility]ARY [facility] Grape Seed Extract 400 mg daily    Historical Provider, MD   Varenicline Tartrate (TYRVAYA) 0.03 MG/ACT SOLN 1 spray by Nasal route in the morning and at bedtime    Historical Provider, MD   tiZANidine (ZANAFLEX) 4 MG tablet Take 1 tablet by mouth 3 times daily as needed (as needed for back spasms) 6/10/22   SCOTT Toledo CNP   NONFORMULARY Silymarin- one capsule daily    Historical Provider, MD   Nutritional Supplements (SILICA PO) Take by mouth 448 mg- 3 tablets daily    Historical Provider, MD   Barberry-Oreg Grape-Goldenseal (BERBERINE COMPLEX PO) Take 250 mg by mouth daily    Historical Provider, MD   NONFORMULARY [place] Cinnamon 1000 mg bid    Historical Provider, MD   Turmeric (QC TUMERIC COMPLEX PO) Take 1,500 mg by mouth daily     Historical Provider, MD   Multiple Vitamins-Minerals (PRESERVISION AREDS 2) CAPS Take 1 capsule by mouth 2 times daily    Historical Provider, MD   QUERCETIN PO Take 2 capsules by mouth 2 times daily (800 mg each), with Bromeline 165 mg each    Historical Provider, MD   vitamin D3 (CHOLECALCIFEROL) 25 MCG (1000 UT) TABS tablet Take 1 tablet by mouth daily 9/10/20   SCOTT Garces CNP   NONFORMULARY Iodine 2%  - 6 drops orally every morning    Historical Provider, MD   Omega-3 Fatty Acids (OMEGA 3 PO) Take 1,600 mg by mouth daily     Historical Provider, MD   Coenzyme Q10 (CO Q-10) 300 MG CAPS Take 1 capsule by mouth daily    Historical Provider, MD   Calcium Carb-Cholecalciferol (CALCIUM 1000 + D PO) Take 1,000 mg by mouth daily     Historical Provider, MD   CRANBERRY PO Take 1,300 mg by mouth 2 times daily     Historical Provider, MD   aspirin 81 MG tablet Take 81 mg by mouth daily     Historical Provider, MD   GARLIC Take 364 mg by mouth daily. Historical Provider, MD   Lactobacillus (ACIDOPHILUS PO) Take by mouth PB8 acidophilus 1 gm daily. Historical Provider, MD   Ascorbic Acid (VITAMIN C) 1000 MG tablet Take 1,000 mg by mouth daily. Historical Provider, MD   NONFORMULARY Formula [facility] 75 daily. Multivitamin with chelated minerals    Historical Provider, MD   Methylsulfonylmethane (MSM PO) Take 3 tablets by mouth daily     Historical Provider, MD       Current medications:    No current facility-administered medications for this encounter. Allergies: Allergies   Allergen Reactions    Ciprofloxacin Nausea Only    Evista [Raloxifene] Other (See Comments)     Caused joint pain and menopausal symptoms    Statins Other (See Comments)     Lethargy, muscle pain    Other      Bandaids? Left a rash    Sulfa Antibiotics Hives and Rash       Problem List:    Patient Active Problem List   Diagnosis Code    Dyslipidemia E78.5    Menopausal syndrome N95.1    Vitamin D deficiency E55.9    Hypothyroidism E03.9    Impaired glucose tolerance R73.02    Osteopenia M85.80    Bell's palsy G51.0    Facial droop R29.810    Appendicitis K37       Past Medical History:        Diagnosis Date    Hypercholesterolemia     Hypothyroidism     Subacute, not currently on treeatment.     Impaired glucose tolerance     Prediabetes/stable and checking A1C yearly    Macular degeneration     Menopausal syndrome     Mitral regurgitation     Echo 1/19 mild MR    Osteopenia     Dexa 8/18 FRAX 1% at hip, T -1.5 Hip    Vitamin D deficiency        Past Surgical History:        Procedure Laterality Date    BLADDER SUSPENSION      In the past for a cystocele.  BREAST BIOPSY Right     Negative.  COLONOSCOPY N/A 3/12/2019    COLONOSCOPY performed by Chet Castano DO at [address] [place] [address]      Was done due to a cystocele several years ago and was negagtive.  DILATION AND CURETTAGE OF UTERUS      With hysteroscopy.  ENDOMETRIAL ABLATION      At age 44.  SIGMOIDOSCOPY      At age 52. Was negative per patien, although she did not have a followup barium enema with this for unknown reasons.  TUBAL LIGATION         Social History:    Social History     Tobacco Use    Smoking status: Never    Smokeless tobacco: Never    Tobacco comments:     exposed to 2nd hand smoke as child   Substance Use Topics    Alcohol use: Yes     Alcohol/week: 0.0 standard drinks     Comment: Half to 1 glass of red wine daily. Counseling given: Not Answered  Tobacco comments: exposed to 2nd hand smoke as child      Vital Signs (Current): There were no vitals filed for this visit.                                            BP Readings from Last 3 Encounters:   10/20/22 115/70   07/27/22 (!) 140/82   07/14/22 110/60       NPO Status: Time of last liquid consumption: 0800                        Time of last solid consumption: 1600                        Date of last liquid consumption: 10/20/22                        Date of last solid food consumption: 10/19/22    BMI:   Wt Readings from Last 3 Encounters:   10/20/22 159 lb 6.4 oz (72.3 kg)   07/27/22 159 lb 9.6 oz (72.4 kg)   07/14/22 158 lb (71.7 kg)     There is no height or weight on file to calculate BMI.    CBC:   Lab Results   Component Value Date/Time    WBC 9.8 10/20/2022 09:09 AM    RBC 4.97 10/20/2022 09:09 AM    HGB 14.9 10/20/2022 09:09 AM    HCT 43.5 10/20/2022 09:09 AM    MCV 87.5 10/20/2022 09:09 AM    RDW 12.8 10/20/2022 09:09 AM     10/20/2022 09:09 AM       CMP:   Lab Results   Component Value Date/Time     10/20/2022 09:09 AM    K 3.7 10/20/2022 09:09 AM     10/20/2022 09:09 AM    CO2 28 10/20/2022 09:09 AM    BUN 13 10/20/2022 09:09 AM    CREATININE 0.70 10/20/2022 09:09 AM    GFRAA >60 07/07/2022 10:09 AM    LABGLOM >60 10/20/2022 09:09 AM    GLUCOSE 113 10/20/2022 09:09 AM    PROT 6.9 10/20/2022 09:09 AM    CALCIUM 9.4 10/20/2022 09:09 AM    BILITOT 0.9 10/20/2022 09:09 AM    ALKPHOS 94 10/20/2022 09:09 AM    AST 19 10/20/2022 09:09 AM    ALT 15 10/20/2022 09:09 AM       POC Tests: No results for input(s): POCGLU, POCNA, POCK, POCCL, POCBUN, POCHEMO, POCHCT in the last 72 hours. Coags: No results found for: PROTIME, INR, APTT    HCG (If Applicable): No results found for: PREGTESTUR, PREGSERUM, HCG, HCGQUANT     ABGs: No results found for: PHART, PO2ART, OWW6QGV, WWY6RCQ, BEART, W0ILAKEB     Type & Screen (If Applicable):  No results found for: LABABO, LABRH    Drug/Infectious Status (If Applicable):  Lab Results   Component Value Date/Time    HEPCAB NONREACTIVE 01/06/2017 07:41 AM       COVID-19 Screening (If Applicable):   Lab Results   Component Value Date/Time    COVID19 Not Detected 10/20/2022 11:41 AM    COVID19 Not Detected 05/10/2022 10:21 AM           Anesthesia Evaluation  Patient summary reviewed and Nursing notes reviewed no history of anesthetic complications:   Airway: Mallampati: II  TM distance: >3 FB   Neck ROM: full  Mouth opening: > = 3 FB   Dental: normal exam         Pulmonary:Negative Pulmonary ROS breath sounds clear to auscultation                             Cardiovascular:  Exercise tolerance: good (>4 METS),   (+) hyperlipidemia    (-)  angina      Rhythm: regular  Rate: normal           Beta Blocker:  Not on Beta Blocker         Neuro/Psych:   (+) neuromuscular disease:,             GI/Hepatic/Renal: Neg GI/Hepatic/Renal ROS            Endo/Other:    (+) hypothyroidism::., .                 Abdominal:             Vascular: negative vascular ROS.          Other Findings:           Anesthesia Plan      general     ASA 2 - emergent       Induction: intravenous. MIPS: Postoperative opioids intended and Prophylactic antiemetics administered. Anesthetic plan and risks discussed with patient and spouse.       Plan discussed with surgical team.                    Geovani January, SCOTT - CRNA   10/20/2022

## 2022-10-20 NOTE — ANESTHESIA POSTPROCEDURE EVALUATION
Department of Anesthesiology  Postprocedure Note    Patient: Lisbet Murphy  MRN: [id number]  YOB: 1955  Date of evaluation: 10/20/2022      Procedure Summary     Date: 10/20/22 Room / Location: [address] [place] [address]    Anesthesia Start: 1633 Anesthesia Stop: 1744    Procedure: Laparoscopic Robotic Assisted Appendectomy Diagnosis:       Acute appendicitis, unspecified acute appendicitis type      (Acute appendicitis, unspecified acute appendicitis type [K35.80])    Surgeons: Lulu Silva DO Responsible Provider: SCOTT Calvillo CRNA    Anesthesia Type: general ASA Status: 2 - Emergent          Anesthesia Type: No value filed.     Horace Phase I:      Horace Phase II:        Anesthesia Post Evaluation    Patient location during evaluation: PACU  Patient participation: complete - patient participated  Level of consciousness: awake and alert  Pain score: 2  Airway patency: patent  Nausea & Vomiting: no nausea and no vomiting  Complications: no  Cardiovascular status: blood pressure returned to baseline and hemodynamically stable  Respiratory status: acceptable, spontaneous ventilation and room air  Hydration status: euvolemic

## 2022-10-20 NOTE — DISCHARGE INSTRUCTIONS
Patient Discharge Instructions  Discharge Date:  10/21/22       Discharged To: Home    Home with Home Health Care: No    RESUME ACTIVITY:      BATHING: Ok to shower starting the day after surgery. No tub baths or submerging in water until after follow up in office. DRIVING: No driving for 1week  or while taking narcotic pain medications    RETURN TO WORK: [address] [address] to return as tolerated 1 week following surgery with the following restrictions:  No lifting more than 10 pounds  The above restrictions are in effect for 4 week(s)    WALKING:  Yes    STAIRS:  Yes    LIFTING: Less than 10 pounds for 4weeks     DIET: common adult    SPECIAL INSTRUCTIONS:     Call the office at [phone] if you have a fever > 100 F, or if your incision becomes red, tender, or drains more than a small amount of clear fluid. Call for follow up appointment with Dr. Maher Skill in:  1-2weeks    May use ibuprofen, if able, for additional pain control. Use up to 400mg every 6 hours as needed. Ok to use ice packs to incisions for comfort. Use 15 minutes on, 30 minutes off and repeat as desired.

## 2022-10-21 NOTE — OP NOTE
[facility] [address]                 [address] [place] [address]                                OPERATIVE REPORT    PATIENT NAME: Florencio Solis                   :        1955  MED REC NO:   [id number]                             ROOM:  ACCOUNT NO:   [de-identified]                           ADMIT DATE: 10/20/2022  PROVIDER:     Karely Self    DATE OF PROCEDURE:  10/20/2022    SURGEON:  Dr. Karely Self. ASSISTANT:  None. PREOPERATIVE DIAGNOSIS:  Acute appendicitis. POSTOPERATIVE DIAGNOSIS:  Acute appendicitis. PROCEDURE:  Robotic-assisted laparoscopic appendectomy. ANESTHESIA:  General.    ESTIMATED BLOOD LOSS:  10 mL. FLUIDS:  1000 mL of crystalloid. COMPLICATIONS:  None. SPECIMEN:  Appendix. INDICATION FOR PROCEDURE:  The patient is a 75-year-old female who is  seen in ER earlier today after coming in with complaints of  approximately 1 day of worsening abdominal pain. She had workup in the  ER that did show signs concerning for early acute appendicitis on CT. A  general surgery consult was obtained. After evaluation, I did feel that  she was likely experiencing an early appendicitis based on her exam and  the CT findings. We discussed management options and the patient wanted  to proceed with surgery. Prior to the time of the procedure, risks,  benefits, and alternatives were explained to the patient and consent was  obtained. DESCRIPTION OF PROCEDURE:  The patient was brought to the operative  suite and placed on the operative table in the supine position. Monitoring devices and SCD cuffs were placed. General endotracheal  anesthesia was induced. After induction of anesthesia, time-out was  performed and correct patient and procedure were verified. The  patient's abdomen was prepped and draped in the sterile fashion.   Prior  to the time of incision, the patient received preoperative antibiotics  in accordance with SCIP protocol. Skin in the left upper quadrant of  the abdomen approximately 3-4 fingerbreadths below costal margin at  midclavicular line was anesthetized with local anesthetic and a small  transverse incision was made. Veress needle was advanced into the  abdomen. Saline drop test showed no aspiration of blood or enteric  fluid and free flow of saline. Insufflation tubing was connected and  the patient's abdomen was insufflated to 12 mmHg. Once done, the Veress  needle was removed and an 8 mm robotic trocar with laparoscope in place  was advanced into the abdomen in an Optiview fashion. Once in,  inspection of underlying structures showed no damage during entrance. Inspection in the abdomen did show some inflammatory changes in the  right lower quadrant, but the appendix was not immediately visible. I  went ahead and placed two additional robotic trocars in the right  abdomen below the initial trocar under visualization with laparoscope. Once this was done, the robot was docked and targeted to right lower  quadrant. The patient was positioned slightly head down rolled to left  side. Once we had the robot docked, I went to the console and began to  explore the right lower abdomen. There were some adhesions of the right  colon up to the abdominal wall and I followed the colon down to the  cecum and after sweeping away a little bit of omentum and small bowel, I  did visualize the appendix. Inspection at this point did show that the  probably distal third of the appendix was inflamed, but there was no  evidence of perforation. There was a small amount of inflammatory fluid  in the right lower quadrant around the area where the appendix had been. At this point, I went ahead and started to open a window in the  mesoappendix at the base of the appendix after identifying the base.    The hot scissors were used to cauterize some of the tissue as well as  the Force bipolar and then I created a window so that I could pass  sutures through this window. At this point, the scissors were removed  and 0 silk tie as well as a 3-0 silk suture on needle was placed in the  abdomen and the needle  was placed. I used the 0 silk suture to  tie off the appendix at the base and then place a second tie  approximately 0.5 cm distal to this. Once this was done, I went ahead  and had the assist bring the scissors back in and removed the needle   and I divided the appendix using the scissors between the two  ties. I then took down the mesoappendix using the hot scissors as well  as the electrocautery. The appendiceal artery was identified and was  cauterized with the bipolar to make sure that we had good hemostasis. Once the appendix was completely freed, I then brought the needle   back in and used the 3-0 silk suture to place a pursestring around the  base of the appendix and then dunk the appendix and tie this. At this  point, inspection showed good hemostasis in the field. The needle   was removed and the excess suture material and needle was removed  through the left upper quadrant port site. EndoCatch bag was then  brought in, the appendix was placed in the bag which was closed. Final  inspection in the abdomen showed no obvious injuries to other structures  and good hemostasis. At this point, the instruments were removed and  the robot was undocked. I scrubbed back into the case and did use a  suction  to suction out the small amount of inflammatory fluid  from the right lower quadrant and pelvis. Once this was done under  visualization with the scope, I removed the EndoCatch bag through the  left upper quadrant port site. I did not have to enlarge this at the  fascia level and was able to easily remove the bag through the tissue.    Once this was out, I removed the final instruments and uncapped the  remaining trocars to allow evacuation of CO2 from the abdomen and then  these were

## 2022-10-24 PROBLEM — G89.18 POST-OP PAIN: Status: ACTIVE | Noted: 2022-10-24

## 2022-10-27 LAB — SURGICAL PATHOLOGY REPORT: NORMAL

## 2022-11-01 ENCOUNTER — OFFICE VISIT (OUTPATIENT)
Dept: SURGERY | Age: 67
End: 2022-11-01
Payer: MEDICARE

## 2022-11-01 VITALS
SYSTOLIC BLOOD PRESSURE: 132 MMHG | BODY MASS INDEX: 27.14 KG/M2 | WEIGHT: 159 LBS | HEIGHT: 64 IN | DIASTOLIC BLOOD PRESSURE: 82 MMHG | HEART RATE: 76 BPM

## 2022-11-01 DIAGNOSIS — Z09 POSTOP CHECK: Primary | ICD-10-CM

## 2022-11-01 PROCEDURE — 99212 OFFICE O/P EST SF 10 MIN: CPT | Performed by: SURGERY

## 2022-11-01 PROCEDURE — 99024 POSTOP FOLLOW-UP VISIT: CPT | Performed by: SURGERY

## 2022-11-01 NOTE — ASSESSMENT & PLAN NOTE
Doing well after surgery and healing as expected. I did review the pathology findings with her and she voiced understanding. 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 No evidence of malignancy. Her last colonoscopy was in 2018 so we will recommend that she have a repeat in 2years just based on the finding of the serrated adenoma. Also had a benign epithelial cyst.  No further surgical intervention is needed. Have recommended the patient continue activity restrictions for additional 2-1/2 weeks and then return to activity as tolerated. Okay to drive. Okay for submerging in water. Follow-up as needed. Patient is encouraged to call with any questions concerns or problems.

## 2022-11-01 NOTE — PROGRESS NOTES
Subjective   Kushal Mcclellan is a 69 y.o. female who presents today for follow-up after robotic assisted laparoscopic appendectomy on 10/21. Patient presented to ER with acute appendicitis and was taken that evening for surgery. No problems during the procedure and she was discharged home. Since surgery patient states that she has been doing well. Not having any pain and did not take any prescribed pain medication. She is tolerating diet without any issues. Having regular bowel function. Denies any incisional problems. Past Medical History:   Diagnosis Date    Hypercholesterolemia     Hypothyroidism     Subacute, not currently on treeatment. Impaired glucose tolerance     Prediabetes/stable and checking A1C yearly    Macular degeneration     Menopausal syndrome     Mitral regurgitation     Echo 1/19 mild MR    Osteopenia     Dexa 8/18 FRAX 1% at hip, T -1.5 Hip    Vitamin D deficiency        Past Surgical History:   Procedure Laterality Date    BLADDER SUSPENSION      In the past for a cystocele. BREAST BIOPSY Right     Negative. COLONOSCOPY N/A 3/12/2019    COLONOSCOPY performed by Smiley Roberts DO at [place]      Was done due to a cystocele several years ago and was negagtive. DILATION AND CURETTAGE OF UTERUS      With hysteroscopy. ENDOMETRIAL ABLATION      At age 44. LAPAROSCOPIC APPENDECTOMY N/A 10/20/2022    Laparoscopic Robotic Assisted Appendectomy performed by Navi Luther DO at [address] [place] [address] Place      At age 52. Was negative per patien, although she did not have a followup barium enema with this for unknown reasons.     TUBAL LIGATION         Current Outpatient Medications   Medication Sig Dispense Refill    rosuvastatin (CRESTOR) 5 MG tablet TAKE 1 TABLET NIGHTLY 90 tablet 3    NONFORMULARY [place] Grape Seed Extract 400 mg daily      Varenicline Tartrate (TYRVAYA) 0.03 MG/ACT SOLN 1 spray by Nasal route in the morning and at bedtime      tiZANidine (ZANAFLEX) 4 MG tablet Take 1 tablet by mouth 3 times daily as needed (as needed for back spasms) 30 tablet 0    NONFORMULARY Silymarin- one capsule daily      Nutritional Supplements (SILICA PO) Take by mouth 448 mg- 3 tablets daily      Barberry-Oreg Grape-Goldenseal (BERBERINE COMPLEX PO) Take 250 mg by mouth daily      NONFORMULARY [place] Cinnamon 1000 mg bid      Turmeric (QC TUMERIC COMPLEX PO) Take 1,500 mg by mouth daily       Multiple Vitamins-Minerals (PRESERVISION AREDS 2) CAPS Take 1 capsule by mouth 2 times daily      QUERCETIN PO Take 2 capsules by mouth 2 times daily (800 mg each), with Bromeline 165 mg each      vitamin D3 (CHOLECALCIFEROL) 25 MCG (1000 UT) TABS tablet Take 1 tablet by mouth daily 90 tablet 1    NONFORMULARY Iodine 2%  - 6 drops orally every morning      Omega-3 Fatty Acids (OMEGA 3 PO) Take 1,600 mg by mouth daily       Coenzyme Q10 (CO Q-10) 300 MG CAPS Take 1 capsule by mouth daily      Calcium Carb-Cholecalciferol (CALCIUM 1000 + D PO) Take 1,000 mg by mouth daily       CRANBERRY PO Take 1,300 mg by mouth 2 times daily       aspirin 81 MG tablet Take 81 mg by mouth daily       GARLIC Take 836 mg by mouth daily. Lactobacillus (ACIDOPHILUS PO) Take by mouth PB8 acidophilus 1 gm daily. Ascorbic Acid (VITAMIN C) 1000 MG tablet Take 1,000 mg by mouth daily. NONFORMULARY Formula [facility] 75 daily. Multivitamin with chelated minerals      Methylsulfonylmethane (MSM PO) Take 3 tablets by mouth daily        No current facility-administered medications for this visit. Allergies   Allergen Reactions    Ciprofloxacin Nausea Only    Evista [Raloxifene] Other (See Comments)     Caused joint pain and menopausal symptoms    Statins Other (See Comments)     Lethargy, muscle pain    Other      Bandaids?  Left a rash    Sulfa Antibiotics Hives and Rash       Family History   Problem Relation Age of Onset    Alzheimer's Disease Mother     Diabetes Mother         Diet-controlled Hypertension Father     High Cholesterol Father     Stroke Father     Coronary Art Dis Father          - arterial sclerosis    Heart Attack Father     High Cholesterol Brother     Cancer Brother     Other Child         Ankylosing spondylitis who takes Humira and sees rheumatology regularly. Social History     Socioeconomic History    Marital status:      Spouse name: Not on file    Number of children: Not on file    Years of education: Not on file    Highest education level: Not on file   Occupational History    Occupation: school counselor     Employer: [employer]   Tobacco Use    Smoking status: Never    Smokeless tobacco: Never    Tobacco comments:     exposed to 2nd hand smoke as child   Vaping Use    Vaping Use: Never used   Substance and Sexual Activity    Alcohol use: Yes     Alcohol/week: 0.0 standard drinks     Comment: Half to 1 glass of red wine daily. Drug use: No    Sexual activity: Not on file   Other Topics Concern    Not on file   Social History Narrative    Not on file     Social Determinants of Health     Financial Resource Strain: Not on file   Food Insecurity: Not on file   Transportation Needs: Not on file   Physical Activity: Not on file   Stress: Not on file   Social Connections: Not on file   Intimate Partner Violence: Not on file   Housing Stability: Not on file       ROS:   Review of Systems - Negative except as noted in HPI      Objective   Vitals:    22 1300   BP: 132/82   Pulse: 76     General:in no apparent distress and well developed and well nourished  Eyes: No gross abnormalities. Ears, Nose, Throat: hearing grossly normal bilaterally  Neck: neck supple and non tender without mass  Lungs: clear to auscultation without wheezes or rales   Heart: S1S2, no mumurs, RRR  Abdomen: Soft, nondistended, nontender to palpation, bowel sounds present. Incisions intact with glue in place at 2 of the incisions.   Glue has already come off of the lowest abdominal incision. Minimal ecchymosis but resolving. No signs of infection. Extremity: negative  Neuro: CN II-XII grossly intact      1. Postop check  Assessment & Plan:  Doing well after surgery and healing as expected. I did review the pathology findings with her and she voiced understanding. 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 No evidence of malignancy. Her last colonoscopy was in 2018 so we will recommend that she have a repeat in 2years just based on the finding of the serrated adenoma. Also had a benign epithelial cyst.  No further surgical intervention is needed. Have recommended the patient continue activity restrictions for additional 2-1/2 weeks and then return to activity as tolerated. Okay to drive. Okay for submerging in water. Follow-up as needed. Patient is encouraged to call with any questions concerns or problems.          (Please note that portions of this note were completed with a voice recognition program.  Efforts were made to edit the dictations but occasionally words are mis-transcribed.)

## 2022-12-01 PROBLEM — Z09 POSTOP CHECK: Status: RESOLVED | Noted: 2022-11-01 | Resolved: 2022-12-01

## 2023-01-06 ENCOUNTER — TELEPHONE (OUTPATIENT)
Dept: INTERNAL MEDICINE | Age: 68
End: 2023-01-06

## 2023-01-06 NOTE — TELEPHONE ENCOUNTER
She had labs completed in July and I also reviewed the labs from her ER visit in October - I do not have any additional labs at this time as those results were all unremarkable

## 2023-01-06 NOTE — TELEPHONE ENCOUNTER
Patient stopped in to have labs done for her appointment on 1/16/2023, but when she got to lab there were no orders. Does not look like any were ordered at her last visit. She states she gets them done every 6 months. Please advise.

## 2023-01-09 SDOH — HEALTH STABILITY: PHYSICAL HEALTH: ON AVERAGE, HOW MANY MINUTES DO YOU ENGAGE IN EXERCISE AT THIS LEVEL?: 60 MIN

## 2023-01-09 SDOH — HEALTH STABILITY: PHYSICAL HEALTH: ON AVERAGE, HOW MANY DAYS PER WEEK DO YOU ENGAGE IN MODERATE TO STRENUOUS EXERCISE (LIKE A BRISK WALK)?: 7 DAYS

## 2023-01-09 ASSESSMENT — PATIENT HEALTH QUESTIONNAIRE - PHQ9
SUM OF ALL RESPONSES TO PHQ QUESTIONS 1-9: 0
2. FEELING DOWN, DEPRESSED OR HOPELESS: 0
1. LITTLE INTEREST OR PLEASURE IN DOING THINGS: 0
SUM OF ALL RESPONSES TO PHQ QUESTIONS 1-9: 0
SUM OF ALL RESPONSES TO PHQ9 QUESTIONS 1 & 2: 0
SUM OF ALL RESPONSES TO PHQ QUESTIONS 1-9: 0
SUM OF ALL RESPONSES TO PHQ QUESTIONS 1-9: 0

## 2023-01-09 ASSESSMENT — LIFESTYLE VARIABLES
HOW OFTEN DURING THE LAST YEAR HAVE YOU BEEN UNABLE TO REMEMBER WHAT HAPPENED THE NIGHT BEFORE BECAUSE YOU HAD BEEN DRINKING: 0
HOW OFTEN DURING THE LAST YEAR HAVE YOU BEEN UNABLE TO REMEMBER WHAT HAPPENED THE NIGHT BEFORE BECAUSE YOU HAD BEEN DRINKING: NEVER
HAS A RELATIVE, FRIEND, DOCTOR, OR ANOTHER HEALTH PROFESSIONAL EXPRESSED CONCERN ABOUT YOUR DRINKING OR SUGGESTED YOU CUT DOWN: NO
HOW OFTEN DURING THE LAST YEAR HAVE YOU NEEDED AN ALCOHOLIC DRINK FIRST THING IN THE MORNING TO GET YOURSELF GOING AFTER A NIGHT OF HEAVY DRINKING: NEVER
HOW OFTEN DURING THE LAST YEAR HAVE YOU FOUND THAT YOU WERE NOT ABLE TO STOP DRINKING ONCE YOU HAD STARTED: NEVER
HOW OFTEN DO YOU HAVE SIX OR MORE DRINKS ON ONE OCCASION: 1
HOW OFTEN DO YOU HAVE A DRINK CONTAINING ALCOHOL: 4 OR MORE TIMES A WEEK
HAVE YOU OR SOMEONE ELSE BEEN INJURED AS A RESULT OF YOUR DRINKING: 0
HAVE YOU OR SOMEONE ELSE BEEN INJURED AS A RESULT OF YOUR DRINKING: NO
HOW OFTEN DURING THE LAST YEAR HAVE YOU FAILED TO DO WHAT WAS NORMALLY EXPECTED FROM YOU BECAUSE OF DRINKING: NEVER
HOW OFTEN DURING THE LAST YEAR HAVE YOU FAILED TO DO WHAT WAS NORMALLY EXPECTED FROM YOU BECAUSE OF DRINKING: 0
HOW MANY STANDARD DRINKS CONTAINING ALCOHOL DO YOU HAVE ON A TYPICAL DAY: 1
HOW OFTEN DURING THE LAST YEAR HAVE YOU HAD A FEELING OF GUILT OR REMORSE AFTER DRINKING: NEVER
HOW OFTEN DO YOU HAVE A DRINK CONTAINING ALCOHOL: 5
HOW OFTEN DURING THE LAST YEAR HAVE YOU HAD A FEELING OF GUILT OR REMORSE AFTER DRINKING: 0
HOW OFTEN DURING THE LAST YEAR HAVE YOU NEEDED AN ALCOHOLIC DRINK FIRST THING IN THE MORNING TO GET YOURSELF GOING AFTER A NIGHT OF HEAVY DRINKING: 0
HAS A RELATIVE, FRIEND, DOCTOR, OR ANOTHER HEALTH PROFESSIONAL EXPRESSED CONCERN ABOUT YOUR DRINKING OR SUGGESTED YOU CUT DOWN: 0
HOW MANY STANDARD DRINKS CONTAINING ALCOHOL DO YOU HAVE ON A TYPICAL DAY: 1 OR 2
HOW OFTEN DURING THE LAST YEAR HAVE YOU FOUND THAT YOU WERE NOT ABLE TO STOP DRINKING ONCE YOU HAD STARTED: 0

## 2023-01-16 ENCOUNTER — OFFICE VISIT (OUTPATIENT)
Dept: INTERNAL MEDICINE | Age: 68
End: 2023-01-16
Payer: MEDICARE

## 2023-01-16 ENCOUNTER — HOSPITAL ENCOUNTER (OUTPATIENT)
Age: 68
Discharge: HOME | End: 2023-01-16
Payer: MEDICARE

## 2023-01-16 VITALS
WEIGHT: 159 LBS | HEART RATE: 72 BPM | SYSTOLIC BLOOD PRESSURE: 110 MMHG | DIASTOLIC BLOOD PRESSURE: 62 MMHG | RESPIRATION RATE: 16 BRPM | HEIGHT: 64 IN | BODY MASS INDEX: 27.14 KG/M2

## 2023-01-16 DIAGNOSIS — E78.5 DYSLIPIDEMIA: ICD-10-CM

## 2023-01-16 DIAGNOSIS — E55.9 VITAMIN D DEFICIENCY: ICD-10-CM

## 2023-01-16 DIAGNOSIS — G51.0 BELL'S PALSY: ICD-10-CM

## 2023-01-16 DIAGNOSIS — M85.80 OSTEOPENIA, UNSPECIFIED LOCATION: ICD-10-CM

## 2023-01-16 DIAGNOSIS — Z00.00 MEDICARE ANNUAL WELLNESS VISIT, SUBSEQUENT: Primary | ICD-10-CM

## 2023-01-16 DIAGNOSIS — N95.1 MENOPAUSAL SYNDROME: ICD-10-CM

## 2023-01-16 DIAGNOSIS — R29.810 FACIAL DROOP: ICD-10-CM

## 2023-01-16 DIAGNOSIS — R73.02 IMPAIRED GLUCOSE TOLERANCE: ICD-10-CM

## 2023-01-16 DIAGNOSIS — E03.9 HYPOTHYROIDISM, UNSPECIFIED TYPE: ICD-10-CM

## 2023-01-16 DIAGNOSIS — I44.4 LEFT ANTERIOR FASCICULAR BLOCK: ICD-10-CM

## 2023-01-16 LAB
ALBUMIN SERPL-MCNC: 3.9 G/DL (ref 3.5–5.2)
ALBUMIN/GLOBULIN RATIO: 1.4 (ref 1–2.5)
ALP BLD-CCNC: 93 U/L (ref 35–104)
ALT SERPL-CCNC: 16 U/L (ref 5–33)
ANION GAP SERPL CALCULATED.3IONS-SCNC: 8 MMOL/L (ref 9–17)
AST SERPL-CCNC: 20 U/L
BILIRUB SERPL-MCNC: 0.5 MG/DL (ref 0.3–1.2)
BUN BLDV-MCNC: 16 MG/DL (ref 8–23)
BUN/CREAT BLD: 23 (ref 9–20)
CALCIUM SERPL-MCNC: 9.4 MG/DL (ref 8.6–10.4)
CHLORIDE BLD-SCNC: 109 MMOL/L (ref 98–107)
CHOLESTEROL/HDL RATIO: 2.4
CHOLESTEROL: 177 MG/DL
CO2: 27 MMOL/L (ref 20–31)
CREAT SERPL-MCNC: 0.7 MG/DL (ref 0.5–0.9)
GFR SERPL CREATININE-BSD FRML MDRD: >60 ML/MIN/1.73M2
GLUCOSE BLD-MCNC: 112 MG/DL (ref 70–99)
HDLC SERPL-MCNC: 75 MG/DL
LDL CHOLESTEROL: 90 MG/DL (ref 0–130)
POTASSIUM SERPL-SCNC: 4.5 MMOL/L (ref 3.7–5.3)
SODIUM BLD-SCNC: 144 MMOL/L (ref 135–144)
THYROXINE, FREE: 1.01 NG/DL (ref 0.93–1.7)
TOTAL PROTEIN: 6.6 G/DL (ref 6.4–8.3)
TRIGL SERPL-MCNC: 61 MG/DL
TSH SERPL DL<=0.05 MIU/L-ACNC: 12.6 UIU/ML (ref 0.3–5)
VITAMIN D 25-HYDROXY: 49.7 NG/ML

## 2023-01-16 PROCEDURE — 99212 OFFICE O/P EST SF 10 MIN: CPT | Performed by: NURSE PRACTITIONER

## 2023-01-16 PROCEDURE — 82306 VITAMIN D 25 HYDROXY: CPT

## 2023-01-16 PROCEDURE — 36415 COLL VENOUS BLD VENIPUNCTURE: CPT

## 2023-01-16 PROCEDURE — 80053 COMPREHEN METABOLIC PANEL: CPT

## 2023-01-16 PROCEDURE — 84439 ASSAY OF FREE THYROXINE: CPT

## 2023-01-16 PROCEDURE — 80061 LIPID PANEL: CPT

## 2023-01-16 PROCEDURE — 84443 ASSAY THYROID STIM HORMONE: CPT

## 2023-01-16 ASSESSMENT — LIFESTYLE VARIABLES
HOW MANY STANDARD DRINKS CONTAINING ALCOHOL DO YOU HAVE ON A TYPICAL DAY: PATIENT DOES NOT DRINK
HAVE YOU OR SOMEONE ELSE BEEN INJURED AS A RESULT OF YOUR DRINKING: 0
HOW OFTEN DO YOU HAVE A DRINK CONTAINING ALCOHOL: 4 OR MORE TIMES A WEEK
HOW OFTEN DURING THE LAST YEAR HAVE YOU BEEN UNABLE TO REMEMBER WHAT HAPPENED THE NIGHT BEFORE BECAUSE YOU HAD BEEN DRINKING: 0
HAS A RELATIVE, FRIEND, DOCTOR, OR ANOTHER HEALTH PROFESSIONAL EXPRESSED CONCERN ABOUT YOUR DRINKING OR SUGGESTED YOU CUT DOWN: 0
HOW OFTEN DURING THE LAST YEAR HAVE YOU HAD A FEELING OF GUILT OR REMORSE AFTER DRINKING: 0
HOW OFTEN DURING THE LAST YEAR HAVE YOU NEEDED AN ALCOHOLIC DRINK FIRST THING IN THE MORNING TO GET YOURSELF GOING AFTER A NIGHT OF HEAVY DRINKING: 0
HOW OFTEN DURING THE LAST YEAR HAVE YOU FOUND THAT YOU WERE NOT ABLE TO STOP DRINKING ONCE YOU HAD STARTED: 0
HOW OFTEN DURING THE LAST YEAR HAVE YOU FAILED TO DO WHAT WAS NORMALLY EXPECTED FROM YOU BECAUSE OF DRINKING: 0

## 2023-01-16 ASSESSMENT — ENCOUNTER SYMPTOMS
CHEST TIGHTNESS: 0
SHORTNESS OF BREATH: 0
NAUSEA: 0
DIARRHEA: 0
SORE THROAT: 0
VOMITING: 0

## 2023-01-16 ASSESSMENT — PATIENT HEALTH QUESTIONNAIRE - PHQ9
1. LITTLE INTEREST OR PLEASURE IN DOING THINGS: 0
SUM OF ALL RESPONSES TO PHQ QUESTIONS 1-9: 0
SUM OF ALL RESPONSES TO PHQ9 QUESTIONS 1 & 2: 0
2. FEELING DOWN, DEPRESSED OR HOPELESS: 0
SUM OF ALL RESPONSES TO PHQ QUESTIONS 1-9: 0

## 2023-01-16 NOTE — PROGRESS NOTES
[facility]  [address] [place], [address] Alfreda Atkins  [phone]      Qian Calvillo c/o of Medicare AWV, Hyperlipidemia (6 month appt), and Blood Sugar Problem (IFG- 6 month appt)      HPI:     HPI  Patient also presents for evaluation and management of chronic medical conditions as noted below. Continues to work on diet for impaired fasting glucose, most recent A1c 5.8. Dyslipidemia well-controlled on Crestor, ASCVD 5.8%. Denies chest pain or dyspnea. 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 Hypothyroidism well-controlled without medication, she notes she does take iodine supplements. Vitamin D deficiency well-controlled on current dose of supplement. Continues on calcium supplement for osteopenia, most recent DEXA 11/2020. Also follows with OB/GYN for menopausal symptoms. All residual facial symptoms from her Bell's palsy episode have remained stable, no further benefit from physical therapy was determined to be likely. Recent appendectomy. 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 She was advised to repeat colonoscopy in 2 years due to finding of serrated adenoma.      The 10-year ASCVD risk score (Muriel RAYMOND, et al., 2019) is: 5.8%    Values used to calculate the score:      Age: 69 years      Sex: Female      Is Non- : No      Diabetic: No      Tobacco smoker: No      Systolic Blood Pressure: 123 mmHg      Is BP treated: No      HDL Cholesterol: 71 mg/dL      Total Cholesterol: 174 mg/dL    Current Outpatient Medications   Medication Sig Dispense Refill    NONFORMULARY Tri-Iodine 12,500 mcg- one cap daily      NONFORMULARY Calcium Bone Maker Complex- (300 mg Calcium, with 23 mcg Vit D) - 2 caps daily      rosuvastatin (CRESTOR) 5 MG tablet TAKE 1 TABLET NIGHTLY 90 tablet 3    NONFORMULARY Tamazight Grape Seed Extract 400 mg daily      Varenicline Tartrate (TYRVAYA) 0.03 MG/ACT SOLN 1 spray by Nasal route in the morning and at bedtime      NONFORMULARY Silymarin- one capsule daily      Nutritional Supplements (SILICA PO) Take by mouth 448 mg- 3 tablets daily      Barberry-Oreg Grape-Goldenseal (BERBERINE COMPLEX PO) Take 250 mg by mouth daily      NONFORMULARY [place] Cinnamon 1000 mg bid      Turmeric (QC TUMERIC COMPLEX PO) Take 1,500 mg by mouth daily       Multiple Vitamins-Minerals (PRESERVISION AREDS 2) CAPS Take 1 capsule by mouth 2 times daily      QUERCETIN PO Take 2 capsules by mouth 2 times daily (800 mg each), with Bromeline 165 mg each      vitamin D3 (CHOLECALCIFEROL) 25 MCG (1000 UT) TABS tablet Take 1 tablet by mouth daily 90 tablet 1    Omega-3 Fatty Acids (OMEGA 3 PO) Take 3,200 mg by mouth daily      Coenzyme Q10 (CO Q-10) 300 MG CAPS Take 1 capsule by mouth daily      CRANBERRY PO Take 1,300 mg by mouth 2 times daily       aspirin 81 MG tablet Take 81 mg by mouth daily       GARLIC Take 499 mg by mouth daily. Lactobacillus (ACIDOPHILUS PO) Take by mouth PB8 acidophilus 1 gm daily. Ascorbic Acid (VITAMIN C) 1000 MG tablet Take 1,000 mg by mouth daily. NONFORMULARY Formula UVM 75 daily. Multivitamin with chelated minerals      Methylsulfonylmethane (MSM PO) Take 2 tablets by mouth daily       No current facility-administered medications for this visit. Allergies   Allergen Reactions    Ciprofloxacin Nausea Only    Other      Bandaids?  Left a rash    Sulfa Antibiotics Hives and Rash       Health Maintenance   Topic Date Due    Lipids  01/06/2023    DTaP/Tdap/Td vaccine (2 - Td or Tdap) 04/09/2023    A1C test (Diabetic or Prediabetic)  07/07/2023    Breast cancer screen  11/09/2023    Depression Screen  01/09/2024    Annual Wellness Visit (AWV)  01/17/2024    Colorectal Cancer Screen  03/12/2024    DEXA (modify frequency per FRAX score) Completed    Flu vaccine  Completed    Shingles vaccine  Completed    Pneumococcal 65+ years Vaccine  Completed    COVID-19 Vaccine  Completed    Hepatitis C screen  Completed    Hepatitis A vaccine  Aged Out    Hib vaccine  Aged Out    Meningococcal (ACWY) vaccine  Aged Out       Subjective:      Review of Systems   Constitutional:  Negative for chills and fever. HENT:  Negative for congestion and sore throat. Respiratory:  Negative for chest tightness and shortness of breath. Cardiovascular:  Negative for chest pain and leg swelling. Gastrointestinal:  Negative for diarrhea, nausea and vomiting. Genitourinary:  Negative for difficulty urinating and dysuria. Musculoskeletal:  Negative for gait problem and myalgias. Neurological:  Negative for dizziness and headaches. Psychiatric/Behavioral:  Negative for confusion and decreased concentration. Objective:     Vitals:    01/16/23 0733   BP: 110/62   Site: Right Upper Arm   Position: Sitting   Cuff Size: Large Adult   Pulse: 72   Resp: 16   Weight: 159 lb (72.1 kg)   Height: 5' 4\" (1.626 m)     Physical Exam  Vitals and nursing note reviewed. Constitutional:       General: She is not in acute distress. Appearance: She is well-developed. HENT:      Head: Normocephalic and atraumatic. Right Ear: External ear normal.      Left Ear: External ear normal.   Eyes:      General: Lids are normal.      Extraocular Movements: Extraocular movements intact. Conjunctiva/sclera: Conjunctivae normal.   Neck:      Thyroid: No thyromegaly. Cardiovascular:      Rate and Rhythm: Normal rate and regular rhythm. Heart sounds: No murmur heard. Pulmonary:      Effort: Pulmonary effort is normal. No accessory muscle usage or respiratory distress. Breath sounds: Normal breath sounds. No wheezing, rhonchi or rales. Abdominal:      Palpations: Abdomen is soft. Tenderness: There is no abdominal tenderness.  There is no guarding or rebound. Musculoskeletal:      Cervical back: Neck supple. Right lower leg: No edema. Left lower leg: No edema. Lymphadenopathy:      Cervical: No cervical adenopathy. Skin:     General: Skin is warm and dry. Nails: There is no clubbing. Neurological:      Mental Status: She is alert. Coordination: Coordination normal.      Comments: Stable right sided facial droop s/p Bell's palsy   Psychiatric:         Mood and Affect: Mood normal.         Speech: Speech normal.         Behavior: Behavior normal.       Assessment/Plan:        1. Medicare annual wellness visit, subsequent  - As noted above    2. Impaired glucose tolerance, stable  - Continue efforts at diet and weight loss    3. Dyslipidemia,  stable  - Continue current medications  - Comprehensive Metabolic Panel; Future  - Lipid Panel; Future    4. Left anterior fascicular block, stable  - Continue to follow with cardiology    5. Hypothyroidism, unspecified type, stable  - Continue to monitor  - TSH with Reflex; Future    6. Vitamin D deficiency, stable  - Continue supplement  - Vitamin D 25 Hydroxy; Future    7. Osteopenia, unspecified location, stable  - Continue supplement    8. Menopausal syndrome, stable  - Continue to follow with OBGYN    9. Bell's palsy, stable  10. Facial droop, stable  - Continue to monitor      Return in 6 months (on 7/16/2023) for Medicare Annual Wellness Visit in 1 year.     Orders Placed This Encounter   Procedures    Comprehensive Metabolic Panel     Standing Status:   Future     Number of Occurrences:   1     Standing Expiration Date:   1/16/2024    Lipid Panel     Standing Status:   Future     Number of Occurrences:   1     Standing Expiration Date:   1/16/2024     Order Specific Question:   Is Patient Fasting?/# of Hours     Answer:   8    TSH with Reflex     Standing Status:   Future     Number of Occurrences:   1     Standing Expiration Date:   1/16/2024    Vitamin D 25 Hydroxy     Standing Status: Future     Number of Occurrences:   1     Standing Expiration Date:   1/16/2024       No orders of the defined types were placed in this encounter. All patient questions answered. Pt voiced understanding.            Electronically signed by SCOTT Stewart CNP on 1/16/2023 at 8:20 AM

## 2023-01-16 NOTE — PATIENT INSTRUCTIONS
Personalized Preventive Plan for Starla Calvillo - 1/16/2023  Medicare offers a range of preventive health benefits. Some of the tests and screenings are paid in full while other may be subject to a deductible, co-insurance, and/or copay. Some of these benefits include a comprehensive review of your medical history including lifestyle, illnesses that may run in your family, and various assessments and screenings as appropriate. After reviewing your medical record and screening and assessments performed today your provider may have ordered immunizations, labs, imaging, and/or referrals for you. A list of these orders (if applicable) as well as your Preventive Care list are included within your After Visit Summary for your review. Other Preventive Recommendations:    A preventive eye exam performed by an eye specialist is recommended every 1-2 years to screen for glaucoma; cataracts, macular degeneration, and other eye disorders. A preventive dental visit is recommended every 6 months. Try to get at least 150 minutes of exercise per week or 10,000 steps per day on a pedometer . Order or download the FREE \"Exercise & Physical Activity: Your Everyday Guide\" from [employer]. Call 9-268.962.7749 or search [employer] online. You need 6905-4613 mg of calcium and 8700-4564 IU of vitamin D per day. It is possible to meet your calcium requirement with diet alone, but a vitamin D supplement is usually necessary to meet this goal.  When exposed to the sun, use a sunscreen that protects against both UVA and UVB radiation with an SPF of 30 or greater. Reapply every 2 to 3 hours or after sweating, drying off with a towel, or swimming. Always wear a seat belt when traveling in a car. Always wear a helmet when riding a bicycle or motorcycle. Advance Directives: Care Instructions  Overview  An advance directive is a legal way to state your wishes at the end of your life. It tells your family and your doctor what to do if you can't say what you want. There are two main types of advance directives. You can change them any time your wishes change. Living will. This form tells your family and your doctor your wishes about life support and other treatment. The form is also called a declaration. Medical power of . This form lets you name a person to make treatment decisions for you when you can't speak for yourself. This person is called a health care agent (health care proxy, health care surrogate). The form is also called a durable power of  for health care. If you do not have an advance directive, decisions about your medical care may be made by a family member, or by a doctor or a  who doesn't know you. It may help to think of an advance directive as a gift to the people who care for you. If you have one, they won't have to make tough decisions by themselves. For more information, including forms for your state, see the [address] [facility] website (www.caringinfo.org/planning/advance-directives/). Follow-up care is a key part of your treatment and safety. Be sure to make and go to all appointments, and call your doctor if you are having problems. It's also a good idea to know your test results and keep a list of the medicines you take. What should you include in an advance directive? Many states have a unique advance directive form. (It may ask you to address specific issues.) Or you might use a universal form that's approved by many states. If your form doesn't tell you what to address, it may be hard to know what to include in your advance directive. Use the questions below to help you get started. Who do you want to make decisions about your medical care if you are not able to? What life-support measures do you want if you have a serious illness that gets worse over time or can't be cured? What are you most afraid of that might happen?  (Maybe you're afraid of having pain, losing your independence, or being kept alive by machines.)  Where would you prefer to die? (Your home? A hospital? A nursing home?)  Do you want to donate your organs when you die? Do you want certain [employer] practices performed before you die? When should you call for help? Be sure to contact your doctor if you have any questions. Where can you learn more? Go to http://www.freeman.com/ and enter R264 to learn more about \"Advance Directives: Care Instructions. \"  Current as of: June 16, 2022               Content Version: 13.5  © 8799-6522 [employer]wise, [employer]. Care instructions adapted under license by [facility] ([facility]). If you have questions about a medical condition or this instruction, always ask your healthcare professional. [address] any warranty or liability for your use of this information. Personalized Preventive Plan for Dottie Calvillo - 1/16/2023  Medicare offers a range of preventive health benefits. Some of the tests and screenings are paid in full while other may be subject to a deductible, co-insurance, and/or copay. Some of these benefits include a comprehensive review of your medical history including lifestyle, illnesses that may run in your family, and various assessments and screenings as appropriate. After reviewing your medical record and screening and assessments performed today your provider may have ordered immunizations, labs, imaging, and/or referrals for you. A list of these orders (if applicable) as well as your Preventive Care list are included within your After Visit Summary for your review. Other Preventive Recommendations:    A preventive eye exam performed by an eye specialist is recommended every 1-2 years to screen for glaucoma; cataracts, macular degeneration, and other eye disorders. A preventive dental visit is recommended every 6 months.   Try to get at least 150 minutes of exercise per week or 10,000 steps per day on a pedometer .  Order or download the FREE \"Exercise & Physical Activity: Your Everyday Guide\" from [employer]. Call 1-526.338.6997 or search [employer] online.  You need 4046-5610 mg of calcium and 2616-6178 IU of vitamin D per day. It is possible to meet your calcium requirement with diet alone, but a vitamin D supplement is usually necessary to meet this goal.  When exposed to the sun, use a sunscreen that protects against both UVA and UVB radiation with an SPF of 30 or greater. Reapply every 2 to 3 hours or after sweating, drying off with a towel, or swimming.  Always wear a seat belt when traveling in a car. Always wear a helmet when riding a bicycle or motorcycle.

## 2023-01-16 NOTE — PROGRESS NOTES
Medicare Annual Wellness Visit    Oscar Mcfarland is here for Medicare AWV, Hyperlipidemia (6 month appt), and Blood Sugar Problem (IFG- 6 month appt)    Assessment & Plan   Routine general medical examination at a health care facility  Impaired glucose tolerance  Dyslipidemia  Hypothyroidism, unspecified type  Vitamin D deficiency  Osteopenia, unspecified location  Menopausal syndrome  Facial paralysis/[place] palsy  Dizziness  Left anterior fascicular block    Recommendations for Preventive Services Due: see orders and patient instructions/AVS.  Recommended screening schedule for the next 5-10 years is provided to the patient in written form: see Patient Instructions/AVS.     No follow-ups on file. Subjective   The following acute and/or chronic problems were also addressed today:  See additional documentation    Patient's complete Health Risk Assessment and screening values have been reviewed and are found in Flowsheets. The following problems were reviewed today and where indicated follow up appointments were made and/or referrals ordered. No Positive Risk Factors identified today. Objective   Vitals:    01/16/23 0733   BP: 110/62   Site: Right Upper Arm   Position: Sitting   Cuff Size: Large Adult   Pulse: 72   Resp: 16   Weight: 159 lb (72.1 kg)   Height: 5' 4\" (1.626 m)      Body mass index is 27.29 kg/m². See additional documentation       Allergies   Allergen Reactions    Ciprofloxacin Nausea Only    Other      Bandaids? Left a rash    Sulfa Antibiotics Hives and Rash     Prior to Visit Medications    Medication Sig Taking?  Authorizing Provider   NONFORMULARY Tri-Iodine 12,500 mcg- one cap daily Yes Historical Provider, MD   NONFORMULARY Calcium Bone Maker Complex- (300 mg Calcium, with 23 mcg Vit D) - 2 caps daily Yes Historical Provider, MD   rosuvastatin (CRESTOR) 5 MG tablet TAKE 1 TABLET NIGHTLY Yes SCOTT Delgado - CNP   NONFORMULARY Kevin Carter Seed Extract 400 mg daily Yes Historical Provider, MD   Varenicline Tartrate (TYRVAYA) 0.03 MG/ACT SOLN 1 spray by Nasal route in the morning and at bedtime Yes Historical Provider, MD   NONFORMULARY Silymarin- one capsule daily Yes Historical Provider, MD   Nutritional Supplements (SILICA PO) Take by mouth 448 mg- 3 tablets daily Yes Historical Provider, MD   Barberry-Oreg Grape-Goldenseal (BERBERINE COMPLEX PO) Take 250 mg by mouth daily Yes Historical Provider, MD   NONFORMULARY [place] Cinnamon 1000 mg bid Yes Historical Provider, MD   Turmeric (QC TUMERIC COMPLEX PO) Take 1,500 mg by mouth daily  Yes Historical Provider, MD   Multiple Vitamins-Minerals (PRESERVISION AREDS 2) CAPS Take 1 capsule by mouth 2 times daily Yes Historical Provider, MD   QUERCETIN PO Take 2 capsules by mouth 2 times daily (800 mg each), with Bromeline 165 mg each Yes Historical Provider, MD   vitamin D3 (CHOLECALCIFEROL) 25 MCG (1000 UT) TABS tablet Take 1 tablet by mouth daily Yes SCOTT Shipman - CNP   Omega-3 Fatty Acids (OMEGA 3 PO) Take 1,600 mg by mouth daily  Yes Historical Provider, MD   Coenzyme Q10 (CO Q-10) 300 MG CAPS Take 1 capsule by mouth daily Yes Historical Provider, MD   CRANBERRY PO Take 1,300 mg by mouth 2 times daily  Yes Historical Provider, MD   aspirin 81 MG tablet Take 81 mg by mouth daily  Yes Historical Provider, MD   GARLIC Take 147 mg by mouth daily. Yes Historical Provider, MD   Lactobacillus (ACIDOPHILUS PO) Take by mouth PB8 acidophilus 1 gm daily. Yes Historical Provider, MD   Ascorbic Acid (VITAMIN C) 1000 MG tablet Take 1,000 mg by mouth daily. Yes Historical Provider, MD   NONFORMULARY Formula UVM 75 daily.  Multivitamin with chelated minerals Yes Historical Provider, MD   Methylsulfonylmethane (MSM PO) Take 2 tablets by mouth daily Yes Historical Provider, MD Garcia (Including outside providers/suppliers regularly involved in providing care):   Patient Care Team:  Calvin Diaz SCOTT - CNP as PCP - General (Family Nurse Practitioner)  SCOTT Hitchcock CNP as PCP - REHABILITATION [facility] Empaneled Provider     Reviewed and updated this visit:  Tobacco  Allergies  Meds  Med Hx  Surg Hx  Soc Hx  Fam Hx        I, Val Biswas LPN, [birth year]/59[birth year], performed the documented evaluation under the direct supervision of the NP.

## 2023-01-17 ENCOUNTER — PATIENT MESSAGE (OUTPATIENT)
Dept: INTERNAL MEDICINE | Age: 68
End: 2023-01-17

## 2023-01-17 DIAGNOSIS — E03.9 HYPOTHYROIDISM, UNSPECIFIED TYPE: Primary | ICD-10-CM

## 2023-01-17 NOTE — RESULT ENCOUNTER NOTE
Patient notified per Mychart response. Will wait for pt to decide if she wants to start a Thyroid med.

## 2023-03-30 ENCOUNTER — PATIENT MESSAGE (OUTPATIENT)
Dept: INTERNAL MEDICINE | Age: 68
End: 2023-03-30

## 2023-03-30 DIAGNOSIS — L60.8 DISCOLORED NAILS: Primary | ICD-10-CM

## 2023-03-30 NOTE — TELEPHONE ENCOUNTER
From: Qian Calvillo  To: Lazarus Britain  Sent: 3/30/2023 11:54 AM EDT  Subject: Podiatrist    My right and left big toenails have sections of dark discoloration. After researching on Internet Im not sure if its fungal or bruised or something else.  Would you recommend I see a podiatrist?

## 2023-04-03 ENCOUNTER — OFFICE VISIT (OUTPATIENT)
Dept: PODIATRY | Age: 68
End: 2023-04-03
Payer: MEDICARE

## 2023-04-03 VITALS
DIASTOLIC BLOOD PRESSURE: 80 MMHG | SYSTOLIC BLOOD PRESSURE: 120 MMHG | HEART RATE: 84 BPM | BODY MASS INDEX: 26.8 KG/M2 | HEIGHT: 64 IN | WEIGHT: 157 LBS

## 2023-04-03 DIAGNOSIS — S90.221A SUBUNGUAL HEMATOMA OF FOOT, RIGHT, INITIAL ENCOUNTER: Primary | ICD-10-CM

## 2023-04-03 DIAGNOSIS — L60.8 LONGITUDINAL MELANONYCHIA: ICD-10-CM

## 2023-04-03 PROCEDURE — 99214 OFFICE O/P EST MOD 30 MIN: CPT | Performed by: PODIATRIST

## 2023-04-03 PROCEDURE — 99203 OFFICE O/P NEW LOW 30 MIN: CPT | Performed by: PODIATRIST

## 2023-04-03 PROCEDURE — 1123F ACP DISCUSS/DSCN MKR DOCD: CPT | Performed by: PODIATRIST

## 2023-04-03 NOTE — PROGRESS NOTES
Subjective:  Oni Oliva is a 77 y.o. female who presents to the office today complaining of discoloration to great toenails  Symptoms began  year(s) ago. For the left big toe. Right big toe is more recently. Patient has got more active walking. Patient relates pain is Present. Pain is rated 1 out of 10 and is described as mild. Treatments prior to today's visit include: none. Currently denies F/C/N/V. Allergies   Allergen Reactions    Ciprofloxacin Nausea Only    Other      Bandaids? Left a rash    Sulfa Antibiotics Hives and Rash       Past Medical History:   Diagnosis Date    Hypercholesterolemia     Hypothyroidism     Subacute, not currently on treeatment. Impaired glucose tolerance     Prediabetes/stable and checking A1C yearly    Macular degeneration     Menopausal syndrome     Mitral regurgitation     Echo 1/19 mild MR    Osteopenia     Dexa 8/18 FRAX 1% at hip, T -1.5 Hip    Vitamin D deficiency        Prior to Admission medications    Medication Sig Start Date End Date Taking?  Authorizing Provider   NONFORMULARY Iodine 2%- 6 drops by mouth every morning (re-started 1/17/23 per [email or web address] message)   Yes Historical Provider, MD   NONFORMULARY Tri-Iodine 12,500 mcg- one cap daily   Yes Historical Provider, MD   NONFORMULARY Calcium Bone Maker Complex- (300 mg Calcium, with 23 mcg Vit D) - 2 caps daily   Yes Historical Provider, MD   rosuvastatin (CRESTOR) 5 MG tablet TAKE 1 TABLET NIGHTLY 9/22/22  Yes SCOTT Lopez - CNP   NONFORMULARY [facility] Grape Seed Extract 400 mg daily   Yes Historical Provider, MD   Varenicline Tartrate (TYRVAYA) 0.03 MG/ACT SOLN 1 spray by Nasal route in the morning and at bedtime   Yes Historical Provider, MD   NONFORMULARY Silymarin- one capsule daily   Yes Historical Provider, MD   Nutritional Supplements (SILICA PO) Take by mouth 448 mg- 3 tablets daily   Yes Historical Provider, MD   Barberry-Oreg Grape-Goldenseal (BERBERINE COMPLEX PO) Take 250 mg by mouth

## 2023-04-17 ENCOUNTER — HOSPITAL ENCOUNTER (OUTPATIENT)
Age: 68
Discharge: HOME | End: 2023-04-17
Payer: MEDICARE

## 2023-04-17 DIAGNOSIS — E03.9 HYPOTHYROIDISM, UNSPECIFIED TYPE: ICD-10-CM

## 2023-04-17 LAB
T4 FREE SERPL-MCNC: 0.9 NG/DL (ref 0.9–1.7)
TSH SERPL-ACNC: 14.01 UIU/ML (ref 0.3–5)

## 2023-04-17 PROCEDURE — 36415 COLL VENOUS BLD VENIPUNCTURE: CPT

## 2023-04-17 PROCEDURE — 84443 ASSAY THYROID STIM HORMONE: CPT

## 2023-04-17 PROCEDURE — 84439 ASSAY OF FREE THYROXINE: CPT

## 2023-04-25 ENCOUNTER — PATIENT MESSAGE (OUTPATIENT)
Dept: INTERNAL MEDICINE | Age: 68
End: 2023-04-25

## 2023-04-25 NOTE — TELEPHONE ENCOUNTER
From: Qian Calvillo  To: Analy Rubi  Sent: 4/25/2023 12:33 PM EDT  Subject: Thyroid    I am possibly thinking of taking [place] thyroid again. When I called [facility] it was not in my formulary and will require a prior authorization. Would you be interested in looking into this?
Levothyroxine is generally preferred treatment for hypothyroidism according to current guidelines. I have not initiated armour thyroid for patients in the past, but am very familiar with the titration of levothyroxine. Would she be interested in starting levothyroxine?
Levothyroxine is generally well tolerated, and I would say it is among one of the medications I most frequently prescribe. I would start out with a low dose and titrate slowly to avoid side effects. The medication does tend to be long term, as thyroid function rarely returns once hypothyroidism is diagnosed. We would monitor TSH and FT4 levels every 6-8 weeks while adjusting the dose, and then at least yearly thereafter. I will try to attach patient information to explain some more of your questions.
Will need to send script to the pharmacy; that will prompt the prior auth if needed.
STG (3-5 sessions) sit to stand/stand to sit CG

## 2023-07-29 ENCOUNTER — OFFICE VISIT (OUTPATIENT)
Dept: PRIMARY CARE CLINIC | Age: 68
End: 2023-07-29
Payer: MEDICARE

## 2023-07-29 ENCOUNTER — HOSPITAL ENCOUNTER (OUTPATIENT)
Age: 68
Discharge: HOME | End: 2023-07-29
Payer: MEDICARE

## 2023-07-29 VITALS
DIASTOLIC BLOOD PRESSURE: 86 MMHG | SYSTOLIC BLOOD PRESSURE: 132 MMHG | OXYGEN SATURATION: 97 % | TEMPERATURE: 97.2 F | BODY MASS INDEX: 26.67 KG/M2 | WEIGHT: 156.2 LBS | HEIGHT: 64 IN | HEART RATE: 62 BPM

## 2023-07-29 DIAGNOSIS — R35.0 FREQUENCY OF URINATION: ICD-10-CM

## 2023-07-29 DIAGNOSIS — N30.91 CYSTITIS WITH HEMATURIA: Primary | ICD-10-CM

## 2023-07-29 LAB
BACTERIA URNS QL MICRO: ABNORMAL
BILIRUB UR QL STRIP: NEGATIVE
CHARACTER UR: ABNORMAL
CLARITY UR: CLEAR
COLOR UR: YELLOW
EPI CELLS #/AREA URNS HPF: ABNORMAL /HPF (ref 0–5)
GLUCOSE UR STRIP-MCNC: NEGATIVE MG/DL
HGB UR QL STRIP.AUTO: ABNORMAL
KETONES UR STRIP-MCNC: NEGATIVE MG/DL
LEUKOCYTE ESTERASE UR QL STRIP: ABNORMAL
NITRITE UR QL STRIP: NEGATIVE
PH UR STRIP: 6.5 [PH] (ref 5–6)
PROT UR STRIP-MCNC: NEGATIVE MG/DL
RBC #/AREA URNS HPF: ABNORMAL /HPF (ref 0–4)
SP GR UR STRIP: 1.01 (ref 1.01–1.02)
UROBILINOGEN UR STRIP-ACNC: NORMAL EU/DL (ref 0–1)
WBC #/AREA URNS HPF: ABNORMAL /HPF (ref 0–4)

## 2023-07-29 PROCEDURE — 99213 OFFICE O/P EST LOW 20 MIN: CPT

## 2023-07-29 PROCEDURE — 81001 URINALYSIS AUTO W/SCOPE: CPT

## 2023-07-29 PROCEDURE — 1123F ACP DISCUSS/DSCN MKR DOCD: CPT

## 2023-07-29 RX ORDER — NITROFURANTOIN 25; 75 MG/1; MG/1
100 CAPSULE ORAL 2 TIMES DAILY
Qty: 10 CAPSULE | Refills: 0 | Status: SHIPPED | OUTPATIENT
Start: 2023-07-29 | End: 2023-08-03

## 2023-07-29 ASSESSMENT — ENCOUNTER SYMPTOMS
NAUSEA: 0
BLOOD IN STOOL: 0
RESPIRATORY NEGATIVE: 1
CONSTIPATION: 0
DIARRHEA: 0
EYES NEGATIVE: 1
VOMITING: 0
BACK PAIN: 1
ALLERGIC/IMMUNOLOGIC NEGATIVE: 1

## 2023-07-29 NOTE — PROGRESS NOTES
[facility]  [address]  [address] [address]  Dept: [phone]  Dept Fax: [phone]    Althea Reyes  is a 77 y.o. female who presents today for her medical conditions/complaints as noted below. Althea Reyes is c/o of   Chief Complaint   Patient presents with    Urinary Tract Infection     Frequency, urgency, burning, itching, chills, started this morning  Got a cold sore yesterday for the first time       HPI:     Urinary Tract Infection  This is a new problem. The current episode started today. The problem has been gradually worsening since onset. Associated symptoms include pain. Pertinent negatives include no hematuria. The pain is present in the back. Her pain is at a severity of 4/10. States began walk today and had urgency to urinate and lower back pain      Past Medical History:   Diagnosis Date    Hypercholesterolemia     Hypothyroidism     Subacute, not currently on treeatment. Impaired glucose tolerance     Prediabetes/stable and checking A1C yearly    Macular degeneration     Menopausal syndrome     Mitral regurgitation     Echo 1/19 mild MR    Osteopenia     Dexa 8/18 FRAX 1% at hip, T -1.5 Hip    Vitamin D deficiency      Past Surgical History:   Procedure Laterality Date    BLADDER SUSPENSION      In the past for a cystocele. BREAST BIOPSY Right     Negative. COLONOSCOPY N/A 3/12/2019    COLONOSCOPY performed by Ami Bocanegra DO at [facility]      Was done due to a cystocele several years ago and was negagtive. DILATION AND CURETTAGE OF UTERUS      With hysteroscopy. ENDOMETRIAL ABLATION      At age 44. LAPAROSCOPIC APPENDECTOMY N/A 10/20/2022    Laparoscopic Robotic Assisted Appendectomy performed by Rufina Holstein, DO at [address] [place] [address]      At age 52.  Was negative per patien, although she did not have a followup

## 2023-08-02 ENCOUNTER — OFFICE VISIT (OUTPATIENT)
Dept: CARDIOLOGY | Age: 68
End: 2023-08-02
Payer: MEDICARE

## 2023-08-02 VITALS
HEART RATE: 78 BPM | SYSTOLIC BLOOD PRESSURE: 116 MMHG | WEIGHT: 157 LBS | BODY MASS INDEX: 26.8 KG/M2 | HEIGHT: 64 IN | DIASTOLIC BLOOD PRESSURE: 60 MMHG

## 2023-08-02 DIAGNOSIS — R07.89 ATYPICAL CHEST PAIN: ICD-10-CM

## 2023-08-02 DIAGNOSIS — E78.5 HYPERLIPIDEMIA, UNSPECIFIED HYPERLIPIDEMIA TYPE: ICD-10-CM

## 2023-08-02 DIAGNOSIS — R94.31 ABNORMAL ECG: Primary | ICD-10-CM

## 2023-08-02 PROCEDURE — 93005 ELECTROCARDIOGRAM TRACING: CPT | Performed by: INTERNAL MEDICINE

## 2023-08-02 PROCEDURE — 99214 OFFICE O/P EST MOD 30 MIN: CPT | Performed by: INTERNAL MEDICINE

## 2023-08-02 PROCEDURE — 99212 OFFICE O/P EST SF 10 MIN: CPT | Performed by: INTERNAL MEDICINE

## 2023-08-02 PROCEDURE — 93010 ELECTROCARDIOGRAM REPORT: CPT | Performed by: INTERNAL MEDICINE

## 2023-08-02 PROCEDURE — 1123F ACP DISCUSS/DSCN MKR DOCD: CPT | Performed by: INTERNAL MEDICINE

## 2023-08-02 NOTE — PROGRESS NOTES
not use drugs. Family History: family history includes Alzheimer's Disease in her mother; Cancer in her brother; Coronary Art Dis in her father; Diabetes in her mother; Heart Attack in her father; High Cholesterol in her brother and father; Hypertension in her father; Other in her child; Stroke in her father. No h/o sudden cardiac death. No for premature CAD    REVIEW OF SYSTEMS:    Constitutional: there has been no unanticipated weight loss. There's been No change in energy level, No change in activity level. Eyes: No visual changes or diplopia. No scleral icterus. ENT: No Headaches, hearing loss or vertigo. No mouth sores or sore throat. Cardiovascular: see above  Respiratory: see above  Gastrointestinal: No abdominal pain, appetite loss, blood in stools. Genitourinary: No dysuria, trouble voiding, or hematuria. Musculoskeletal:  No gait disturbance, No weakness or joint complaints. Integumentary: No rash or pruritis. Neurological: No headache or diplopia. No tingling  Psychiatric: No anxiety, or depression. Endocrine: No temperature intolerance. Hematologic/Lymphatic: No abnormal bruising or bleeding, blood clots or swollen lymph nodes. Allergic/Immunologic: No nasal congestion or hives. PHYSICAL EXAM:      Vitals:    08/02/23 0839   BP: 116/60   Pulse: 78       Constitutional and General Appearance: alert, cooperative, no distress and appears stated age  HEENT: PERRL, no cervical lymphadenopathy. No masses palpable. Normal oral mucosa  Respiratory:  Normal excursion and expansion without use of accessory muscles  Resp Auscultation: Good respiratory effort. No for increased work of breathing.  On auscultation: clear to auscultation bilaterally  Cardiovascular:  Heart tones are crisp and normal. regular S1 and S2.  Jugular venous pulsation Normal  The carotid upstroke is normal in amplitude and contour without delay or bruit   Abdomen:   soft  Bowel sounds present  Extremities:   Left leg

## 2023-08-04 ENCOUNTER — TELEPHONE (OUTPATIENT)
Dept: INTERNAL MEDICINE | Age: 68
End: 2023-08-04

## 2023-08-04 RX ORDER — CEPHALEXIN 500 MG/1
500 CAPSULE ORAL 2 TIMES DAILY
Qty: 14 CAPSULE | Refills: 0 | Status: SHIPPED | OUTPATIENT
Start: 2023-08-04 | End: 2023-08-11

## 2023-08-04 NOTE — TELEPHONE ENCOUNTER
Rx pended for you. And patient is aware we are sending it to her preferred pharmacy which is [facility].

## 2023-08-04 NOTE — TELEPHONE ENCOUNTER
Patient was dx with a uti on Saturday her antibiotics are gone and she is requesting a refill since her symptoms have not subsided please advise.     Franklin Echevarria MA

## 2023-08-05 SDOH — ECONOMIC STABILITY: HOUSING INSECURITY
IN THE LAST 12 MONTHS, WAS THERE A TIME WHEN YOU DID NOT HAVE A STEADY PLACE TO SLEEP OR SLEPT IN A SHELTER (INCLUDING NOW)?: NO

## 2023-08-05 SDOH — ECONOMIC STABILITY: TRANSPORTATION INSECURITY
IN THE PAST 12 MONTHS, HAS LACK OF TRANSPORTATION KEPT YOU FROM MEETINGS, WORK, OR FROM GETTING THINGS NEEDED FOR DAILY LIVING?: NO

## 2023-08-05 SDOH — ECONOMIC STABILITY: FOOD INSECURITY: WITHIN THE PAST 12 MONTHS, YOU WORRIED THAT YOUR FOOD WOULD RUN OUT BEFORE YOU GOT MONEY TO BUY MORE.: NEVER TRUE

## 2023-08-05 SDOH — ECONOMIC STABILITY: INCOME INSECURITY: HOW HARD IS IT FOR YOU TO PAY FOR THE VERY BASICS LIKE FOOD, HOUSING, MEDICAL CARE, AND HEATING?: NOT HARD AT ALL

## 2023-08-05 SDOH — ECONOMIC STABILITY: FOOD INSECURITY: WITHIN THE PAST 12 MONTHS, THE FOOD YOU BOUGHT JUST DIDN'T LAST AND YOU DIDN'T HAVE MONEY TO GET MORE.: NEVER TRUE

## 2023-08-08 ENCOUNTER — OFFICE VISIT (OUTPATIENT)
Dept: INTERNAL MEDICINE | Age: 68
End: 2023-08-08
Payer: MEDICARE

## 2023-08-08 ENCOUNTER — HOSPITAL ENCOUNTER (OUTPATIENT)
Age: 68
Discharge: HOME | End: 2023-08-08
Payer: MEDICARE

## 2023-08-08 VITALS
WEIGHT: 158 LBS | BODY MASS INDEX: 26.98 KG/M2 | HEIGHT: 64 IN | DIASTOLIC BLOOD PRESSURE: 60 MMHG | HEART RATE: 68 BPM | SYSTOLIC BLOOD PRESSURE: 116 MMHG | RESPIRATION RATE: 16 BRPM

## 2023-08-08 DIAGNOSIS — R29.810 FACIAL DROOP: ICD-10-CM

## 2023-08-08 DIAGNOSIS — E03.9 HYPOTHYROIDISM, UNSPECIFIED TYPE: ICD-10-CM

## 2023-08-08 DIAGNOSIS — R31.9 HEMATURIA, UNSPECIFIED TYPE: ICD-10-CM

## 2023-08-08 DIAGNOSIS — M85.80 OSTEOPENIA, UNSPECIFIED LOCATION: ICD-10-CM

## 2023-08-08 DIAGNOSIS — E78.5 DYSLIPIDEMIA: ICD-10-CM

## 2023-08-08 DIAGNOSIS — N95.1 MENOPAUSAL SYNDROME: ICD-10-CM

## 2023-08-08 DIAGNOSIS — R73.02 IMPAIRED GLUCOSE TOLERANCE: Primary | ICD-10-CM

## 2023-08-08 DIAGNOSIS — G51.0 BELL'S PALSY: ICD-10-CM

## 2023-08-08 DIAGNOSIS — R73.02 IMPAIRED GLUCOSE TOLERANCE: ICD-10-CM

## 2023-08-08 DIAGNOSIS — E55.9 VITAMIN D DEFICIENCY: ICD-10-CM

## 2023-08-08 DIAGNOSIS — I44.4 LEFT ANTERIOR FASCICULAR BLOCK: ICD-10-CM

## 2023-08-08 LAB
BACTERIA URNS QL MICRO: ABNORMAL
BILIRUB UR QL STRIP: NEGATIVE
CHARACTER UR: ABNORMAL
CLARITY UR: CLEAR
COLOR UR: YELLOW
EPI CELLS #/AREA URNS HPF: ABNORMAL /HPF (ref 0–5)
GLUCOSE UR STRIP-MCNC: NEGATIVE MG/DL
HGB UR QL STRIP.AUTO: NEGATIVE
KETONES UR STRIP-MCNC: NEGATIVE MG/DL
LEUKOCYTE ESTERASE UR QL STRIP: ABNORMAL
NITRITE UR QL STRIP: NEGATIVE
PH UR STRIP: 6 [PH] (ref 5–6)
PROT UR STRIP-MCNC: NEGATIVE MG/DL
RBC #/AREA URNS HPF: ABNORMAL /HPF (ref 0–4)
SP GR UR STRIP: 1.01 (ref 1.01–1.02)
TSH SERPL DL<=0.05 MIU/L-ACNC: 2.86 UIU/ML (ref 0.3–5)
UROBILINOGEN UR STRIP-ACNC: NORMAL EU/DL (ref 0–1)
WBC #/AREA URNS HPF: ABNORMAL /HPF (ref 0–4)

## 2023-08-08 PROCEDURE — 99212 OFFICE O/P EST SF 10 MIN: CPT | Performed by: NURSE PRACTITIONER

## 2023-08-08 PROCEDURE — 99214 OFFICE O/P EST MOD 30 MIN: CPT | Performed by: NURSE PRACTITIONER

## 2023-08-08 PROCEDURE — 84443 ASSAY THYROID STIM HORMONE: CPT

## 2023-08-08 PROCEDURE — 83036 HEMOGLOBIN GLYCOSYLATED A1C: CPT

## 2023-08-08 PROCEDURE — 36415 COLL VENOUS BLD VENIPUNCTURE: CPT

## 2023-08-08 PROCEDURE — 1123F ACP DISCUSS/DSCN MKR DOCD: CPT | Performed by: NURSE PRACTITIONER

## 2023-08-08 PROCEDURE — 81001 URINALYSIS AUTO W/SCOPE: CPT

## 2023-08-08 RX ORDER — CYCLOSPORINE 0.5 MG/ML
1 EMULSION OPHTHALMIC 2 TIMES DAILY
COMMUNITY

## 2023-08-08 SDOH — ECONOMIC STABILITY: INCOME INSECURITY: HOW HARD IS IT FOR YOU TO PAY FOR THE VERY BASICS LIKE FOOD, HOUSING, MEDICAL CARE, AND HEATING?: NOT HARD AT ALL

## 2023-08-08 SDOH — ECONOMIC STABILITY: FOOD INSECURITY: WITHIN THE PAST 12 MONTHS, THE FOOD YOU BOUGHT JUST DIDN'T LAST AND YOU DIDN'T HAVE MONEY TO GET MORE.: NEVER TRUE

## 2023-08-08 SDOH — ECONOMIC STABILITY: FOOD INSECURITY: WITHIN THE PAST 12 MONTHS, YOU WORRIED THAT YOUR FOOD WOULD RUN OUT BEFORE YOU GOT MONEY TO BUY MORE.: NEVER TRUE

## 2023-08-08 ASSESSMENT — ENCOUNTER SYMPTOMS
NAUSEA: 0
VOMITING: 0
SHORTNESS OF BREATH: 0
CHEST TIGHTNESS: 0
DIARRHEA: 0
SORE THROAT: 0

## 2023-08-08 NOTE — PROGRESS NOTES
DTaP/Tdap/Td vaccine (2 - Td or Tdap) 04/09/2023    A1C test (Diabetic or Prediabetic)  07/07/2023    Flu vaccine (1) 09/08/2023 (Originally 8/1/2023)    Breast cancer screen  11/09/2023    Lipids  01/16/2024    Depression Screen  01/16/2024    Annual Wellness Visit (AWV)  01/17/2024    Colorectal Cancer Screen  03/12/2024    DEXA (modify frequency per FRAX score)  Completed    Shingles vaccine  Completed    Pneumococcal 65+ years Vaccine  Completed    COVID-19 Vaccine  Completed    Hepatitis C screen  Completed    Hepatitis A vaccine  Aged Out    Hib vaccine  Aged Out    Meningococcal (ACWY) vaccine  Aged Out    Diabetes screen  Discontinued       Subjective:      Review of Systems   Constitutional:  Negative for chills, fatigue and fever. HENT:  Negative for congestion and sore throat. Respiratory:  Negative for chest tightness and shortness of breath. Cardiovascular:  Negative for chest pain and leg swelling. Gastrointestinal:  Negative for diarrhea, nausea and vomiting. Genitourinary:  Negative for difficulty urinating and dysuria. Musculoskeletal:  Negative for gait problem and myalgias. Neurological:  Negative for dizziness and headaches. Psychiatric/Behavioral:  Negative for confusion and decreased concentration. Objective:     Vitals:    08/08/23 1409   BP: 116/60   Site: Right Upper Arm   Position: Sitting   Cuff Size: Medium Adult   Pulse: 68   Resp: 16   Weight: 158 lb (71.7 kg)   Height: 5' 4\" (1.626 m)     Physical Exam  Vitals reviewed. Constitutional:       General: She is not in acute distress. Appearance: She is not ill-appearing. HENT:      Head: Normocephalic and atraumatic. Right Ear: External ear normal.      Left Ear: External ear normal.   Eyes:      Extraocular Movements: Extraocular movements intact. Conjunctiva/sclera: Conjunctivae normal.   Cardiovascular:      Rate and Rhythm: Normal rate and regular rhythm.    Pulmonary:      Effort: Pulmonary

## 2023-08-09 LAB
EST. AVERAGE GLUCOSE BLD GHB EST-MCNC: 114 MG/DL
HBA1C MFR BLD: 5.6 % (ref 4–6)

## 2023-08-18 ENCOUNTER — TELEPHONE (OUTPATIENT)
Dept: INTERNAL MEDICINE | Age: 68
End: 2023-08-18

## 2023-08-18 RX ORDER — CEPHALEXIN 500 MG/1
500 CAPSULE ORAL 2 TIMES DAILY
Qty: 14 CAPSULE | Refills: 0 | Status: SHIPPED | OUTPATIENT
Start: 2023-08-18 | End: 2023-08-25

## 2023-08-18 NOTE — TELEPHONE ENCOUNTER
Patient called into office and states she had a UTI a couple weeks ago that she was on antibiotics for and she thought that it had cleared up. Patient reports that this morning UTI is back and she is having symptoms of flank pain, urgency and burning with urination. She is unable to bring in a UA, due to being in [place] right now, but will be back home later. Patient is requesting something be sent into [facility] [address] [place] [address] if possible. Please advise.

## 2023-09-18 DIAGNOSIS — E78.5 DYSLIPIDEMIA: ICD-10-CM

## 2023-09-18 RX ORDER — ROSUVASTATIN CALCIUM 5 MG/1
TABLET, COATED ORAL
Qty: 90 TABLET | Refills: 3 | Status: SHIPPED | OUTPATIENT
Start: 2023-09-18

## 2023-09-19 ENCOUNTER — PATIENT MESSAGE (OUTPATIENT)
Dept: INTERNAL MEDICINE | Age: 68
End: 2023-09-19

## 2023-09-19 ENCOUNTER — TELEPHONE (OUTPATIENT)
Dept: INTERNAL MEDICINE | Age: 68
End: 2023-09-19

## 2023-09-19 ENCOUNTER — HOSPITAL ENCOUNTER (OUTPATIENT)
Dept: GENERAL RADIOLOGY | Age: 68
Discharge: HOME | End: 2023-09-21
Payer: MEDICARE

## 2023-09-19 DIAGNOSIS — M25.562 ACUTE PAIN OF LEFT KNEE: ICD-10-CM

## 2023-09-19 DIAGNOSIS — M25.551 CHRONIC RIGHT HIP PAIN: Primary | ICD-10-CM

## 2023-09-19 DIAGNOSIS — M25.551 CHRONIC RIGHT HIP PAIN: ICD-10-CM

## 2023-09-19 DIAGNOSIS — G89.29 CHRONIC RIGHT HIP PAIN: Primary | ICD-10-CM

## 2023-09-19 DIAGNOSIS — G89.29 CHRONIC RIGHT HIP PAIN: ICD-10-CM

## 2023-09-19 PROCEDURE — 73502 X-RAY EXAM HIP UNI 2-3 VIEWS: CPT

## 2023-09-19 PROCEDURE — 73562 X-RAY EXAM OF KNEE 3: CPT

## 2023-09-19 NOTE — TELEPHONE ENCOUNTER
Pt notified per phone call. She would like Ortho Referral placed (here); done. She will have xrays today.

## 2023-09-19 NOTE — TELEPHONE ENCOUNTER
Patient called back and asked if maybe Jayda Lux could order an xray first because maybe it just bursitis going on. She would like to know what Jayda Lux thinks before she goes and sees Ortho.

## 2023-09-19 NOTE — TELEPHONE ENCOUNTER
From: Qian Calvillo  To: Mingo Pierce  Sent: 9/19/2023 12:56 PM EDT  Subject: Hip & knee X-rays     I think the X-ray results are in? What is my next step?

## 2023-09-19 NOTE — TELEPHONE ENCOUNTER
Last appt: 8/8/2023  Next appt: 1/18/2024    Patient called in saying that she would like a referral sent to Orthopedics for her Rt hip and Lt knee. She has been having pain in her Rt hip for a couple months now and thinks it time to have it looked at. She said the Lt knee pain just started on Saturday but it a sharp pain so she would like to get that looked at as well.

## 2023-09-25 SDOH — HEALTH STABILITY: PHYSICAL HEALTH: ON AVERAGE, HOW MANY DAYS PER WEEK DO YOU ENGAGE IN MODERATE TO STRENUOUS EXERCISE (LIKE A BRISK WALK)?: 7 DAYS

## 2023-09-25 SDOH — HEALTH STABILITY: PHYSICAL HEALTH: ON AVERAGE, HOW MANY MINUTES DO YOU ENGAGE IN EXERCISE AT THIS LEVEL?: 50 MIN

## 2023-09-27 ENCOUNTER — OFFICE VISIT (OUTPATIENT)
Dept: ORTHOPEDIC SURGERY | Age: 68
End: 2023-09-27
Payer: MEDICARE

## 2023-09-27 VITALS
HEIGHT: 64 IN | DIASTOLIC BLOOD PRESSURE: 75 MMHG | SYSTOLIC BLOOD PRESSURE: 141 MMHG | WEIGHT: 158 LBS | BODY MASS INDEX: 26.98 KG/M2 | HEART RATE: 75 BPM

## 2023-09-27 DIAGNOSIS — M25.562 CHRONIC PAIN OF LEFT KNEE: Primary | ICD-10-CM

## 2023-09-27 DIAGNOSIS — M23.92 ACUTE INTERNAL DERANGEMENT OF LEFT KNEE: ICD-10-CM

## 2023-09-27 DIAGNOSIS — G89.29 CHRONIC PAIN OF LEFT KNEE: Primary | ICD-10-CM

## 2023-09-27 PROCEDURE — 99203 OFFICE O/P NEW LOW 30 MIN: CPT | Performed by: STUDENT IN AN ORGANIZED HEALTH CARE EDUCATION/TRAINING PROGRAM

## 2023-09-27 PROCEDURE — 1123F ACP DISCUSS/DSCN MKR DOCD: CPT | Performed by: STUDENT IN AN ORGANIZED HEALTH CARE EDUCATION/TRAINING PROGRAM

## 2023-09-27 PROCEDURE — 99212 OFFICE O/P EST SF 10 MIN: CPT | Performed by: STUDENT IN AN ORGANIZED HEALTH CARE EDUCATION/TRAINING PROGRAM

## 2023-09-27 NOTE — PROGRESS NOTES
[facility]  [address]  [address] [facility]  [facility] [address] [facility]  Dept: [phone]    Ambulatory Orthopedic Office Visit      CHIEF COMPLAINT:    Chief Complaint   Patient presents with    Knee Pain     Left knee pain       HISTORY OF PRESENT ILLNESS:      The patient is a 77 y. o.female who is being seen at the request of  SCOTT Stanton Ra, CNP for consultation and evaluation of left knee pain. Patient reports acute onset of left knee pain started on September 16th. The pain started after a twisting injury. She now has persistent knee pain with associated catching and locking. She reports that she likes to walk 3 miles a day and has been a able to do that since the injury. She has taken over-the-counter anti-inflammatories without any pain. Denies any numbness or tingling. Denies any fever, chills. Denies any prior knee pain to this injury. Past Medical History:    Past Medical History:   Diagnosis Date    Hypercholesterolemia     Hypothyroidism     Subacute, not currently on treeatment. Impaired glucose tolerance     Prediabetes/stable and checking A1C yearly    Macular degeneration     Menopausal syndrome     Mitral regurgitation     Echo 1/19 mild MR    Osteopenia     Dexa 8/18 FRAX 1% at hip, T -1.5 Hip    Vitamin D deficiency        Past Surgical History:    Past Surgical History:   Procedure Laterality Date    BLADDER SUSPENSION      In the past for a cystocele. BREAST BIOPSY Right     Negative. COLONOSCOPY N/A 3/12/2019    COLONOSCOPY performed by Ami Bocanegra DO at [facility]      Was done due to a cystocele several years ago and was negagtive. DILATION AND CURETTAGE OF UTERUS      With hysteroscopy. ENDOMETRIAL ABLATION      At age 44.     LAPAROSCOPIC APPENDECTOMY N/A 10/20/2022    Laparoscopic Robotic Assisted Appendectomy performed by Rufina Holstein,

## 2023-10-02 ENCOUNTER — TELEPHONE (OUTPATIENT)
Dept: ORTHOPEDIC SURGERY | Age: 68
End: 2023-10-02

## 2023-10-02 NOTE — TELEPHONE ENCOUNTER
Patient called in asking if she can have an MRI order on her hip also. Informed patient Dr. Lulu Box has not seen her for her hip yet so he would not be able to order an MRI for the hip.

## 2023-10-04 ENCOUNTER — HOSPITAL ENCOUNTER (OUTPATIENT)
Dept: MRI IMAGING | Age: 68
Discharge: HOME | End: 2023-10-06
Attending: STUDENT IN AN ORGANIZED HEALTH CARE EDUCATION/TRAINING PROGRAM
Payer: MEDICARE

## 2023-10-04 DIAGNOSIS — M25.562 CHRONIC PAIN OF LEFT KNEE: ICD-10-CM

## 2023-10-04 DIAGNOSIS — G89.29 CHRONIC PAIN OF LEFT KNEE: ICD-10-CM

## 2023-10-04 PROCEDURE — 73721 MRI JNT OF LWR EXTRE W/O DYE: CPT

## 2023-10-11 ENCOUNTER — OFFICE VISIT (OUTPATIENT)
Dept: ORTHOPEDIC SURGERY | Age: 68
End: 2023-10-11
Payer: MEDICARE

## 2023-10-11 VITALS
SYSTOLIC BLOOD PRESSURE: 122 MMHG | BODY MASS INDEX: 26.63 KG/M2 | DIASTOLIC BLOOD PRESSURE: 68 MMHG | WEIGHT: 156 LBS | HEIGHT: 64 IN

## 2023-10-11 DIAGNOSIS — M25.562 CHRONIC PAIN OF LEFT KNEE: Primary | ICD-10-CM

## 2023-10-11 DIAGNOSIS — G89.29 CHRONIC PAIN OF LEFT KNEE: Primary | ICD-10-CM

## 2023-10-11 DIAGNOSIS — M23.92 ACUTE INTERNAL DERANGEMENT OF LEFT KNEE: ICD-10-CM

## 2023-10-11 DIAGNOSIS — S83.232D COMPLEX TEAR OF MEDIAL MENISCUS OF LEFT KNEE AS CURRENT INJURY, SUBSEQUENT ENCOUNTER: ICD-10-CM

## 2023-10-11 PROCEDURE — 99212 OFFICE O/P EST SF 10 MIN: CPT | Performed by: STUDENT IN AN ORGANIZED HEALTH CARE EDUCATION/TRAINING PROGRAM

## 2023-10-11 RX ORDER — MELOXICAM 15 MG/1
15 TABLET ORAL DAILY
Qty: 30 TABLET | Refills: 3 | Status: SHIPPED | OUTPATIENT
Start: 2023-10-11

## 2023-10-11 NOTE — PROGRESS NOTES
[facility] [address]  [address] [place] [address]  Dept: [phone]  Dept Fax: [phone]        Ambulatory Follow Up    Subjective:   Raheel Woo is a 77y.o. year old female who presents to our office today for routine followup regarding:   her   1. Chronic pain of left knee    2. Acute internal derangement of left knee    3. Complex tear of medial meniscus of left knee as current injury, subsequent encounter    . Chief Complaint   Patient presents with    Knee Pain     MRI results L knee       HPI  Priya Bernstein is a 39-year-old female that presents today for left knee pain follow-up. We ordered an MRI after her last clinic visit for further evaluation. Since her last visit she reports that her knee pain is actually improved. She still continues to have global knee pain. Pain is located primarily in the posterior medial knee. Pain made worse with activity. Improved with rest.  She is currently not taking any thing for pain. Denies any numbness or tingling. Review of Systems   Constitutional: Negative for fever and chills. HENT: Negative for congestion. Eyes: Negative for blurred vision and double vision. Respiratory: Negative for cough, shortness of breath and wheezing. Cardiovascular: Negative for chest pain and palpitations. Gastrointestinal: Negative for nausea. Negative for vomiting. Musculoskeletal: Positive for myalgias and joint pain  Skin: Negative for itching and rash. Neurological: Negative for dizziness, sensory change and headaches. Psychiatric/Behavioral: Negative for depression and suicidal ideas. Objective :   General: AAOx3, NAD, appears  stated age  Ortho Exam  LLE: Skin intact. Minimal effusion. Medial joint line tenderness to palpation. Range of motion is 0 to 120 degrees. Pain with Nazia's medially. 1 a Lachman.   Knee is stable

## 2023-10-17 ENCOUNTER — HOSPITAL ENCOUNTER (OUTPATIENT)
Dept: PHYSICAL THERAPY | Age: 68
Setting detail: THERAPIES SERIES
Discharge: HOME | End: 2023-10-17
Attending: STUDENT IN AN ORGANIZED HEALTH CARE EDUCATION/TRAINING PROGRAM
Payer: MEDICARE

## 2023-10-17 PROCEDURE — 97161 PT EVAL LOW COMPLEX 20 MIN: CPT | Performed by: PHYSICAL THERAPIST

## 2023-10-17 ASSESSMENT — PAIN DESCRIPTION - ORIENTATION: ORIENTATION: LEFT

## 2023-10-17 ASSESSMENT — PAIN DESCRIPTION - PAIN TYPE: TYPE: ACUTE PAIN

## 2023-10-17 ASSESSMENT — PAIN DESCRIPTION - DESCRIPTORS: DESCRIPTORS: ACHING;SHARP

## 2023-10-17 ASSESSMENT — PAIN SCALES - GENERAL: PAINLEVEL_OUTOF10: 2

## 2023-10-17 ASSESSMENT — PAIN DESCRIPTION - LOCATION: LOCATION: KNEE

## 2023-10-17 NOTE — FLOWSHEET NOTE
Physical Therapy Daily Treatment Note    Date:  10/17/2023    Patient Name:  Hanane Sandy    :  1955  MRN: [id number]  Restrictions/Precautions:     Medical/Treatment Diagnosis Information:   Diagnosis: M25.562 L knee pain, S83.232D complex MMT  Treatment Diagnosis: M25.562 L knee pain  Insurance/Certification information:  PT Insurance Information: Aetna Medicare  Physician Information:   [id number] of care signed (Y/N):    Visit# / total visits:  1/10  Pain level: 5/10       Time In:10:00   Time Out:10:38    Progress Note: [x]  Yes  []  No  Next due by: Visit #10 , or 23     Subjective:   See eval    Objective: See eval  Observation:   Test measurements:      Exercises:   Exercise/Equipment Resistance/Repetitions Other comments   NUSTEP     Counter ex     L TKE Red 10x    HS curl-sit Red 10x    Squat Matrix     Step up 2\" F/lat/retro        PKF     SAQ      SLR                    US  Medial joint line   [x] Provided verbal/tactile cueing for activities related to strengthening, flexibility, endurance, ROM. (08084)  [] Provided verbal/tactile cueing for activities related to improving balance, coordination, kinesthetic sense, posture, motor skill, proprioception. (76243)    Therapeutic Activities:     [] Therapeutic activities, direct (one-on-one) patient contact (use of dynamic activities to improve functional performance). (36242)    Gait:   [] Provided training and instruction to the patient for ambulation re-education. (26049)    Self-Care/ADL's  [] Self-care/home management training and compensatory training, meal preparation, safety procedures, and instructions in use of assistive technology devices/adaptive equipment, direct one-on-one contact.  (87278)    Home Exercise Program:   T-band TKE & HS curl  [x] Reviewed/Progressed HEP activities related to strengthening, flexibility, endurance, ROM. (14141)  [] Reviewed/Progressed HEP activities related to improving balance, coordination,

## 2023-10-17 NOTE — PROGRESS NOTES
Physical Therapy  Initial Assessment  Date: 10/17/2023  Patient Name: Osmar Whaley  MRN: [id number]  : 1955    Referring Physician: Sita Lance   PCP: SCOTT Buchanan CNP     Medical Diagnosis: Chronic pain of left knee [D18.941, G89.29]  Acute internal derangement of left knee [M23.92]  Complex tear of medial meniscus of left knee as current injury, subsequent encounter [S83.232D] M25.562 L knee pain, S83.232D complex MMT  Treatment Diagnosis: M25.562 L knee pain      Insurance: Payor: Margarita Love / Plan: Shanna Sergautam PPO / Product Type: Medicare /   Insurance ID: [id number] - (Medicare Managed)      Restrictions:- none       Subjective:   General  Chart Reviewed: Yes  Patient Assessed for Rehabilitation Services: Yes  History obtained from[de-identified] Patient, Chart Review  Family/Caregiver Present: No  Diagnosis: M25.562 L knee pain, S83.232D complex MMT  Referring Provider (secondary): Kenyon Merida  Follows Commands: Within Functional Limits  General Comment  Comments: + MRI shows numerous tissue issues within the knee joint  PT Visit Information  Onset Date: 23  PT Insurance Information: [employer]  Referring Provider (secondary): Remberto  Subjective  Subjective: L knee pain started 23. Has had a 3 mile routine for a long time. Having pain with walking, stairs, bending of the knee. No prior history of injury . Every now and then it will buckle.   Prior diagnostic testing[de-identified] MRI  Pain Screening  Patient Currently in Pain: Yes  Pain Assessment: 0-10  Pain Level: 2  Best Pain Level: 2  Worst Pain Level: 4  Pain Type: Acute pain  Pain Location: Knee  Pain Orientation: Left  Pain Descriptors: Aching, Sharp       Vision/Hearing:  Vision  Vision: Within Functional Limits  Hearing  Hearing: Within functional limits    Orientation:  Orientation  Overall Orientation Status: Within Normal Limits  Follows Commands: Within Functional Limits    Social History:  Social

## 2023-10-17 NOTE — PLAN OF CARE
[address] [facility]    [] [place]  Phone: [phone]  Fax: [phone]      [] [place]  Phone: [phone]  Fax: [phone]        To:        Patient: Juanito Zarate  : 1955   MRN: [id number]  Evaluation Date: 10/17/2023      Diagnosis Information:  Diagnosis: M25.562 L knee pain, S83.232D complex MMT   Treatment Diagnosis: M25.562 L knee pain     Physical Therapy Certification Form  Dear   The following patient has been evaluated for physical therapy services and for therapy to continue, Medicare requires monthly physician review of the treatment plan. Please review the attached evaluation and/or summary of the patient's plan of care, and verify that you agree therapy should continue by signing the attached document and sending it back to our office.     Plan of Care/Treatment to date:  [x] Therapeutic Exercise    [] Modalities:  [] Therapeutic Activity     [x] Ultrasound  [] Electrical Stimulation  [] Gait Training      [] Cervical Traction [] Lumbar Traction  [] Neuromuscular Re-education    [] Cold/hotpack [] Iontophoresis   [x] Instruction in HEP     Other:  [x] Manual Therapy      []             [] Aquatic Therapy      []                 Goals:  Short Term Goals  Time Frame for Short Term Goals: 1 week  Short Term Goal 1: Start HEP    Long Term Goals  Time Frame for Long Term Goals : 4 weeks  Long Term Goal 1: L knee pain controlled at 2-3/10 to allow preferred activity levels  Long Term Goal 2: Able to tolerate 2-3 mile wellness walking  Long Term Goal 3: Up/down 6 8\" steps in reciprocal fashion  Long Term Goal 4: L knee extension 0 deg, flexion 135 deg to allow regular walking mechanics    Frequency/Duration:10/17/23 - 23  # Days per week: [] 1 day # Weeks: [] 1 week [] 5 weeks     [x] 2 days   [] 2 weeks [] 6 weeks     [] 3 days   [] 3 weeks [] 7 weeks     [] 4 days   [x] 4 weeks [] 8 weeks    Rehab Potential: [] Excellent [x]

## 2023-10-24 ENCOUNTER — HOSPITAL ENCOUNTER (OUTPATIENT)
Dept: PHYSICAL THERAPY | Age: 68
Setting detail: THERAPIES SERIES
Discharge: HOME | End: 2023-10-24
Attending: STUDENT IN AN ORGANIZED HEALTH CARE EDUCATION/TRAINING PROGRAM
Payer: MEDICARE

## 2023-10-24 PROCEDURE — 97110 THERAPEUTIC EXERCISES: CPT

## 2023-10-24 PROCEDURE — 97035 APP MDLTY 1+ULTRASOUND EA 15: CPT

## 2023-10-24 NOTE — FLOWSHEET NOTE
Physical Therapy Daily Treatment Note    Date:  10/24/2023    Patient Name:  Juanito Zarate    :  1955  MRN: [id number]  Restrictions/Precautions:     Medical/Treatment Diagnosis Information:   Diagnosis: M25.562 L knee pain, S83.232D complex MMT  Treatment Diagnosis: M25.562 L knee pain  Insurance/Certification information:  PT Insurance Information: Aetna Medicare  Physician Information:   [id number] of care signed (Y/N):    Visit# / total visits:  2/10  Pain level: 1/10       Time In: 10:32 am    Time Out: 11:17 am     Progress Note: []  Yes  [x]  No  Next due by: Visit #10 , or 23     Subjective:  Pt reports decreased pain upon arrival. States unable to fully bend knee due to medial knee tightness. Describes discomfort along posterior knee near Baker's cyst. Able to continue walking 2 miles a day. Objective: Therex per flowsheet to increase strength and ROM to complete daily tasks at home. Initiated several exercises this date with good pt tolerance. Verbal cuing for proper exercise technique. Performed US along medial joint line to reduce knee pain and inflammation. Standing HEP given this date. Pt ensures understanding and denies any questions.    Observation:   Test measurements:      Exercises:   Exercise/Equipment Resistance/Repetitions Other comments   NUSTEP     Counter ex 10x each  HEP   L TKE Red 15x    HS curl-sit Red 15x    Hip abd/add 15x, ball     Squat Matrix 10x3 HEP   Step up 10x4\" F/lat/retro        PKF 15x    SAQ 15x     SLR 10x                   US 8' Medial joint line   [x] Provided verbal/tactile cueing for activities related to strengthening, flexibility, endurance, ROM. (22885)  [] Provided verbal/tactile cueing for activities related to improving balance, coordination, kinesthetic sense, posture, motor skill, proprioception. (08887)    Therapeutic Activities:     [] Therapeutic activities, direct (one-on-one) patient contact (use of dynamic activities to improve

## 2023-10-26 ENCOUNTER — HOSPITAL ENCOUNTER (OUTPATIENT)
Dept: PHYSICAL THERAPY | Age: 68
Setting detail: THERAPIES SERIES
Discharge: HOME | End: 2023-10-26
Attending: STUDENT IN AN ORGANIZED HEALTH CARE EDUCATION/TRAINING PROGRAM
Payer: MEDICARE

## 2023-10-26 PROCEDURE — 97110 THERAPEUTIC EXERCISES: CPT

## 2023-10-26 PROCEDURE — 97035 APP MDLTY 1+ULTRASOUND EA 15: CPT

## 2023-10-26 NOTE — FLOWSHEET NOTE
Physical Therapy Daily Treatment Note    Date:  10/26/2023    Patient Name:  Raheel Woo    :  1955  MRN: [id number]  Restrictions/Precautions:     Medical/Treatment Diagnosis Information:   Diagnosis: M25.562 L knee pain, S83.232D complex MMT  Treatment Diagnosis: M25.562 L knee pain  Insurance/Certification information:  PT Insurance Information: Aetna Medicare  Physician Information:   [id number] of care signed (Y/N):    Visit# / total visits:  3/10  Pain level: 0/10       Time In: 2:02 pm     Time Out: 2:48 pm     Progress Note: []  Yes  [x]  No  Next due by: Visit #10 , or 23     Subjective: Pt reports no pain this session. Enjoyed ultrasound last session and felt instant relief right away. Able to walk 2 miles today and mowed the lawn without any pain. Decreased posterior thigh/hamstring pain this date. Objective: Therex per flowsheet to increase strength and ROM to complete daily tasks at home. Progressed reps for several exercises this date with good tolerance. Verbal cuing for proper exercise technique. Performed US along medial joint line to reduce knee pain and inflammation.      Observation:   Test measurements:      Exercises:   Exercise/Equipment Resistance/Repetitions Other comments   NUSTEP 5' Level 1     Counter ex 10x each  HEP   L TKE Red 15x    HS curl-sit Red 20x    Hip abd/add 20x, 20x ball     Squat Matrix 5x 9 position  HEP   Step up 15x4\" F/lat/retro   STS 10x 4\" foam pad    Lateral side stepping/monster walks  2 laps  Red band         PKF 20x    SAQ 15x     SLR 10x    Clamshells  10x              US 8' Medial joint line   [x] Provided verbal/tactile cueing for activities related to strengthening, flexibility, endurance, ROM. (39008)  [] Provided verbal/tactile cueing for activities related to improving balance, coordination, kinesthetic sense, posture, motor skill, proprioception. (70944)    Therapeutic Activities:     [] Therapeutic activities, direct (one-on-one)

## 2023-10-31 ENCOUNTER — HOSPITAL ENCOUNTER (OUTPATIENT)
Dept: PHYSICAL THERAPY | Age: 68
Setting detail: THERAPIES SERIES
Discharge: HOME | End: 2023-10-31
Attending: STUDENT IN AN ORGANIZED HEALTH CARE EDUCATION/TRAINING PROGRAM
Payer: MEDICARE

## 2023-10-31 PROCEDURE — 97035 APP MDLTY 1+ULTRASOUND EA 15: CPT

## 2023-10-31 PROCEDURE — 97110 THERAPEUTIC EXERCISES: CPT

## 2023-10-31 NOTE — FLOWSHEET NOTE
Physical Therapy Daily Treatment Note    Date:  10/31/2023    Patient Name:  Mikel Perez    :  1955  MRN: [id number]  Restrictions/Precautions:     Medical/Treatment Diagnosis Information:   Diagnosis: M25.562 L knee pain, S83.232D complex MMT  Treatment Diagnosis: M25.562 L knee pain  Insurance/Certification information:  PT Insurance Information: Aetna Medicare  Physician Information:   Michelle Host of care signed (Y/N):  y  Visit# / total visits:  4/10  Pain level: 0/10       Time In: 10:19   Time Out: 11:00 pm     Progress Note: []  Yes  [x]  No  Next due by: Visit #10 , or 23     Subjective: pt has already been on walk this morning. Feels knee is doing well. Objective: Therex per flowsheet to increase strength and ROM to complete daily tasks at home. Progressed reps for several exercises this date with good tolerance. Verbal cuing for proper exercise technique. Performed US along medial joint line to reduce knee pain and inflammation. Observation:   Test measurements:  tolerates 2 miles ambulation    Exercises:   Exercise/Equipment Resistance/Repetitions Other comments   NUSTEP 5' Level 1     Counter ex 15x each  HEP   L TKE Green 10x    HS curl-sit Green 10x    Hip abd/add 20x, 20x ball     Squat Matrix 5x 9 position  HEP   Step up 10x6\"   5x retro  F/lat/retro   STS 10x foam pad    Lateral side stepping/monster walks  2 laps  Red band         PKF 20x    SAQ 15x     SLR 10x    Clamshells  10x              US 8' Medial joint line   [x] Provided verbal/tactile cueing for activities related to strengthening, flexibility, endurance, ROM. (41834)  [] Provided verbal/tactile cueing for activities related to improving balance, coordination, kinesthetic sense, posture, motor skill, proprioception. (41646)    Therapeutic Activities:     [] Therapeutic activities, direct (one-on-one) patient contact (use of dynamic activities to improve functional performance).  (24962)    Gait:   [] Provided

## 2023-11-01 NOTE — PROGRESS NOTES
I have reviewed and agree to the content of the note written by the PTA.   Electronically signed by Misha Swenson PT [id number]

## 2023-11-02 ENCOUNTER — HOSPITAL ENCOUNTER (OUTPATIENT)
Dept: PHYSICAL THERAPY | Age: 68
Setting detail: THERAPIES SERIES
Discharge: HOME | End: 2023-11-02
Attending: STUDENT IN AN ORGANIZED HEALTH CARE EDUCATION/TRAINING PROGRAM
Payer: MEDICARE

## 2023-11-02 PROCEDURE — 97035 APP MDLTY 1+ULTRASOUND EA 15: CPT

## 2023-11-02 PROCEDURE — 97110 THERAPEUTIC EXERCISES: CPT

## 2023-11-02 NOTE — FLOWSHEET NOTE
Physical Therapy Daily Treatment Note    Date:  2023    Patient Name:  Cristina Luna    :  1955  MRN: [id number]  Restrictions/Precautions:     Medical/Treatment Diagnosis Information:   Diagnosis: M25.562 L knee pain, S83.232D complex MMT  Treatment Diagnosis: M25.562 L knee pain  Insurance/Certification information:  PT Insurance Information: Aetna Medicare  Physician Information:   Misti Dasilva of care signed (Y/N):  y  Visit# / total visits:  10  Pain level: 0/10       Time In: 1:14   Time Out: 1:56 pm     Progress Note: []  Yes  [x]  No  Next due by: Visit #10 , or 23     Subjective:  pt reports knee is getting better. Feels stronger with less pain. Can do stairs at home again. Objective: Therex per flowsheet to increase strength and ROM to complete daily tasks at home. Progressed reps for several exercises this date with good tolerance. Verbal cuing for proper exercise technique. Performed US along medial joint line to reduce knee pain and inflammation. Observation:   Test measurements:    Exercises:   Exercise/Equipment Resistance/Repetitions Other comments   NUSTEP 5' Level 2    Counter ex 15x each  HEP   L TKE Green 10x    HS curl-sit Green 10x    Hip abd/add 20x, 20x ball  green   Squat Matrix 5x 9 position  HEP   Step up 10x6\"   5x retro  F/lat/retro   STS 10x 2\" booster    Lateral side stepping/monster walks  2 laps  Red band         PKF 20x    SAQ 15x     SLR 10x    Clamshells  10x              US 8' Medial joint line   [x] Provided verbal/tactile cueing for activities related to strengthening, flexibility, endurance, ROM. (12582)  [] Provided verbal/tactile cueing for activities related to improving balance, coordination, kinesthetic sense, posture, motor skill, proprioception. (51449)    Therapeutic Activities:     [] Therapeutic activities, direct (one-on-one) patient contact (use of dynamic activities to improve functional performance).  (67526)    Gait:   [] Provided

## 2023-11-07 ENCOUNTER — HOSPITAL ENCOUNTER (OUTPATIENT)
Dept: PHYSICAL THERAPY | Age: 68
Setting detail: THERAPIES SERIES
Discharge: HOME | End: 2023-11-07
Attending: STUDENT IN AN ORGANIZED HEALTH CARE EDUCATION/TRAINING PROGRAM
Payer: MEDICARE

## 2023-11-07 PROCEDURE — 97035 APP MDLTY 1+ULTRASOUND EA 15: CPT

## 2023-11-07 PROCEDURE — 97110 THERAPEUTIC EXERCISES: CPT

## 2023-11-07 NOTE — FLOWSHEET NOTE
Physical Therapy Daily Treatment Note    Date:  2023    Patient Name:  Shiv Blue    :  1955  MRN: [id number]  Restrictions/Precautions:     Medical/Treatment Diagnosis Information:   Diagnosis: M25.562 L knee pain, S83.232D complex MMT  Treatment Diagnosis: M25.562 L knee pain  Insurance/Certification information:  PT Insurance Information: Aetna Medicare  Physician Information:   Jesse Thomasestic of care signed (Y/N):  y  Visit# / total visits:  6/10  Pain level: 0/10       Time In: 10:15   Time Out: 11:00    Progress Note: []  Yes  [x]  No  Next due by: Visit #10 , or 23     Subjective: Pt reports was able to walk 1.5 miles this morning. Did 2 miles yesterday. Usually knee fatigues however much better than when first starting coming. Objective: Therex per flowsheet to increase strength and ROM to complete daily tasks at home. Progressed reps for several exercises this date with good tolerance. Verbal cuing for proper exercise technique. Performed US along medial joint line to reduce knee pain and inflammation.      Observation:   Test measurements: 0/10 pain    Exercises:   Exercise/Equipment Resistance/Repetitions Other comments   NUSTEP 5' Level 2    Counter ex 20x each  HEP   L TKE Green 10x    HS curl-sit Green 10x    Hip abd/add 20x, 20x ball  green   Squat Matrix 5x 9 position  HEP  no UE   Step up 10x6\"    F/lat/retro   STS 10x    Lateral side stepping/monster walks  2 laps  green band         PKF 20x    SAQ 15x     SLR 10x    Clamshells  10x              US 8' Medial joint line   [x] Provided verbal/tactile cueing for activities related to strengthening, flexibility, endurance, ROM. (46593)  [] Provided verbal/tactile cueing for activities related to improving balance, coordination, kinesthetic sense, posture, motor skill, proprioception. (37831)    Therapeutic Activities:     [] Therapeutic activities, direct (one-on-one) patient contact (use of dynamic activities to improve

## 2023-11-08 NOTE — PROGRESS NOTES
I have reviewed and agree to the content of the note written by the PTA.   Electronically signed by Tobi Acosta PT [id number]

## 2023-11-09 ENCOUNTER — HOSPITAL ENCOUNTER (OUTPATIENT)
Dept: PHYSICAL THERAPY | Age: 68
Setting detail: THERAPIES SERIES
Discharge: HOME | End: 2023-11-09
Attending: STUDENT IN AN ORGANIZED HEALTH CARE EDUCATION/TRAINING PROGRAM
Payer: MEDICARE

## 2023-11-09 PROCEDURE — 97035 APP MDLTY 1+ULTRASOUND EA 15: CPT | Performed by: PHYSICAL THERAPIST

## 2023-11-09 PROCEDURE — 97110 THERAPEUTIC EXERCISES: CPT | Performed by: PHYSICAL THERAPIST

## 2023-11-09 NOTE — FLOWSHEET NOTE
Physical Therapy Daily Treatment Note    Date:  2023    Patient Name:  Blu York    :  1955  MRN: [id number]  Restrictions/Precautions:     Medical/Treatment Diagnosis Information:   Diagnosis: M25.562 L knee pain, S83.232D complex MMT  Treatment Diagnosis: M25.562 L knee pain  Insurance/Certification information:  PT Insurance Information: Aetna Medicare  Physician Information:   [employer] of care signed (Y/N):  y  Visit# / total visits:  7/10  Pain level: 0/10       Time In: 10:53   Time Out: 11:35    Progress Note: []  Yes  [x]  No  Next due by: Visit #10 , or 23     Subjective: Is getting back to wellness walking regimin. Feels mild knee stiffness rather than pain  Objective:   Observation:   Test measurements:   Can 1/2 kneel and up, difficult with L LE    Exercises:   Exercise/Equipment Resistance/Repetitions Other comments   NUSTEP 5' Level 2    Counter ex 20x each  HEP   L TKE Green 20x    HS curl-sit Green 20x    Hip abd/add 20x, grn    Squat Matrix 5x 9 position  HEP  no UE   Step up 10x6\"    F/lat/retro   STS 10x    Lateral side stepping/monster walks  2 laps           PKF 20x    SAQ 15x     SLR 10x    Clamshells  10x              US 8' Medial joint line   [x] Provided verbal/tactile cueing for activities related to strengthening, flexibility, endurance, ROM. (91134)  [] Provided verbal/tactile cueing for activities related to improving balance, coordination, kinesthetic sense, posture, motor skill, proprioception. (82776)    Therapeutic Activities:     [] Therapeutic activities, direct (one-on-one) patient contact (use of dynamic activities to improve functional performance). (34429)    Gait:   [] Provided training and instruction to the patient for ambulation re-education.  (01845)    Self-Care/ADL's  [] Self-care/home management training and compensatory training, meal preparation, safety procedures, and instructions in use of assistive technology devices/adaptive equipment,

## 2023-11-14 ENCOUNTER — HOSPITAL ENCOUNTER (OUTPATIENT)
Dept: PHYSICAL THERAPY | Age: 68
Setting detail: THERAPIES SERIES
Discharge: HOME | End: 2023-11-14
Attending: STUDENT IN AN ORGANIZED HEALTH CARE EDUCATION/TRAINING PROGRAM
Payer: MEDICARE

## 2023-11-14 PROCEDURE — 97110 THERAPEUTIC EXERCISES: CPT

## 2023-11-14 NOTE — FLOWSHEET NOTE
Physical Therapy Daily Treatment Note    Date:  2023    Patient Name:  Analy Linares    :  1955  MRN: [id number]  Restrictions/Precautions:     Medical/Treatment Diagnosis Information:   Diagnosis: M25.562 L knee pain, S83.232D complex MMT  Treatment Diagnosis: M25.562 L knee pain  Insurance/Certification information:  PT Insurance Information: Aetna Medicare  Physician Information:   Yennifer Dalton of care signed (Y/N):  y  Visit# / total visits:  8/10  Pain level: 0/10       Time In: 10:16   Time Out: 11:56    Progress Note: []  Yes  [x]  No  Next due by: Visit #10 , or 23     Subjective: Pt reports knee is quite a bit better. Now R knee is starting to hurt some. Objective: Therex per flowsheet to increase strength and ROM to complete daily tasks at home. New TB given this date. Held US to see how pt feels without it. Observation:   Test measurements:       Exercises:   Exercise/Equipment Resistance/Repetitions Other comments   NUSTEP 5' Level 2    Counter ex 15x each  HEP   L TKE Green 20x    HS curl-sit Green 20x    Hip abd/add 20x, grn    Squat Matrix 5x 9 position  HEP  no UE   Step up 10x6\"    F/lat/retro   STS 10x    Lateral side stepping/monster walks  2 laps           PKF 20x    SAQ 15x     SLR 10x    Clamshells  10x              US  Medial joint line   [x] Provided verbal/tactile cueing for activities related to strengthening, flexibility, endurance, ROM. (23753)  [] Provided verbal/tactile cueing for activities related to improving balance, coordination, kinesthetic sense, posture, motor skill, proprioception. (18192)    Therapeutic Activities:     [] Therapeutic activities, direct (one-on-one) patient contact (use of dynamic activities to improve functional performance). (31157)    Gait:   [] Provided training and instruction to the patient for ambulation re-education.  (92020)    Self-Care/ADL's  [] Self-care/home management training and compensatory training, meal

## 2023-11-15 NOTE — PROGRESS NOTES
I have reviewed and agree to the content of the note written by the PTA.   Electronically signed by Marianne Serna PT [id number]

## 2023-11-16 ENCOUNTER — HOSPITAL ENCOUNTER (OUTPATIENT)
Dept: PHYSICAL THERAPY | Age: 68
Setting detail: THERAPIES SERIES
Discharge: HOME | End: 2023-11-16
Attending: STUDENT IN AN ORGANIZED HEALTH CARE EDUCATION/TRAINING PROGRAM
Payer: MEDICARE

## 2023-11-16 PROCEDURE — 97110 THERAPEUTIC EXERCISES: CPT

## 2023-11-16 NOTE — FLOWSHEET NOTE
instructions in use of assistive technology devices/adaptive equipment, direct one-on-one contact. (96901)    Home Exercise Program:   T-band TKE & HS curl  [x] Reviewed/Progressed HEP activities related to strengthening, flexibility, endurance, ROM. (80345)  [] Reviewed/Progressed HEP activities related to improving balance, coordination, kinesthetic sense, posture, motor skill, proprioception.  (96263)    Manual Treatments:    [] Provided manual therapy to mobilize soft tissue/joints for the purpose of modulating pain, promoting relaxation,  increasing ROM, reducing/eliminating soft tissue swelling/inflammation/restriction, improving soft tissue extensibility.  (65856)    Service Based Modalities:       Timed Code Treatment Minutes:   36' Therex     Total Treatment Minutes:   36'    Treatment/Activity Tolerance:  [x] Patient tolerated treatment well [] Patient limited by fatique  [] Patient limited by pain  [] Patient limited by other medical complications  [] Other:     Prognosis: [x] Good [] Fair  [] Poor    Patient Requires Follow-up: [x] Yes  [] No      Goals:  Short Term Goals  Time Frame for Short Term Goals: 1 week  Short Term Goal 1: Start HEP -met    Long Term Goals  Time Frame for Long Term Goals : 4 weeks  Long Term Goal 1: L knee pain controlled at 2-3/10 to allow preferred activity levels (2/100  Long Term Goal 2: Able to tolerate 2-3 mile wellness walking (2 miles tolerated)  Long Term Goal 3: Up/down 6 8\" steps in reciprocal fashion (able)  Long Term Goal 4: L knee extension 0 deg, flexion 135 deg to allow regular walking mechanics- met    Plan:   [] Continue per plan of care [] Alter current plan (see comments)  [] Plan of care initiated [] Hold pending MD visit [x] Discharge    Plan for Next Session:  d/c 11/16/23    Electronically signed by:  Cedric Napoles PTA

## 2024-01-02 ENCOUNTER — PATIENT MESSAGE (OUTPATIENT)
Dept: INTERNAL MEDICINE | Age: 69
End: 2024-01-02

## 2024-01-02 DIAGNOSIS — R73.02 IMPAIRED GLUCOSE TOLERANCE: Primary | ICD-10-CM

## 2024-01-02 DIAGNOSIS — E78.5 DYSLIPIDEMIA: ICD-10-CM

## 2024-01-02 DIAGNOSIS — E55.9 VITAMIN D DEFICIENCY: ICD-10-CM

## 2024-01-02 DIAGNOSIS — E03.9 HYPOTHYROIDISM, UNSPECIFIED TYPE: ICD-10-CM

## 2024-01-02 NOTE — TELEPHONE ENCOUNTER
From: Qian Calvillo  To: Lauren Garzon  Sent: 1/2/2024 12:36 PM EST  Subject: Bloodwork     Have you requested any bloodwork before my January18th appointment?

## 2024-01-04 ENCOUNTER — HOSPITAL ENCOUNTER (OUTPATIENT)
Age: 69
Discharge: HOME | End: 2024-01-04
Payer: MEDICARE

## 2024-01-04 DIAGNOSIS — E03.9 HYPOTHYROIDISM, UNSPECIFIED TYPE: ICD-10-CM

## 2024-01-04 DIAGNOSIS — R73.02 IMPAIRED GLUCOSE TOLERANCE: ICD-10-CM

## 2024-01-04 DIAGNOSIS — E55.9 VITAMIN D DEFICIENCY: ICD-10-CM

## 2024-01-04 DIAGNOSIS — E78.5 DYSLIPIDEMIA: ICD-10-CM

## 2024-01-04 LAB
25(OH)D3 SERPL-MCNC: 43.4 NG/ML (ref 30–100)
ALBUMIN SERPL-MCNC: 3.9 G/DL (ref 3.5–5.2)
ALBUMIN/GLOB SERPL: 1.5 {RATIO} (ref 1–2.5)
ALP SERPL-CCNC: 97 U/L (ref 35–104)
ALT SERPL-CCNC: 15 U/L (ref 5–33)
ANION GAP SERPL CALCULATED.3IONS-SCNC: 7 MMOL/L (ref 9–17)
AST SERPL-CCNC: 21 U/L
BASOPHILS # BLD: 0.04 K/UL (ref 0–0.2)
BASOPHILS NFR BLD: 1 % (ref 0–2)
BILIRUB SERPL-MCNC: 0.8 MG/DL (ref 0.3–1.2)
BUN SERPL-MCNC: 18 MG/DL (ref 8–23)
BUN/CREAT SERPL: 26 (ref 9–20)
CALCIUM SERPL-MCNC: 9.4 MG/DL (ref 8.6–10.4)
CHLORIDE SERPL-SCNC: 109 MMOL/L (ref 98–107)
CHOLEST SERPL-MCNC: 187 MG/DL (ref 0–199)
CHOLESTEROL/HDL RATIO: 3
CO2 SERPL-SCNC: 28 MMOL/L (ref 20–31)
CREAT SERPL-MCNC: 0.7 MG/DL (ref 0.5–0.9)
EOSINOPHIL # BLD: 0.21 K/UL (ref 0–0.44)
EOSINOPHILS RELATIVE PERCENT: 5 % (ref 1–4)
ERYTHROCYTE [DISTWIDTH] IN BLOOD BY AUTOMATED COUNT: 13.1 % (ref 11.8–14.4)
EST. AVERAGE GLUCOSE BLD GHB EST-MCNC: 105 MG/DL
GFR SERPL CREATININE-BSD FRML MDRD: >60 ML/MIN/1.73M2
GLUCOSE SERPL-MCNC: 94 MG/DL (ref 70–99)
HBA1C MFR BLD: 5.3 % (ref 4–6)
HCT VFR BLD AUTO: 44.6 % (ref 36.3–47.1)
HDLC SERPL-MCNC: 70 MG/DL
HGB BLD-MCNC: 15 G/DL (ref 11.9–15.1)
IMM GRANULOCYTES # BLD AUTO: <0.03 K/UL (ref 0–0.3)
IMM GRANULOCYTES NFR BLD: 0 %
LDLC SERPL CALC-MCNC: 102 MG/DL (ref 0–100)
LYMPHOCYTES NFR BLD: 1.12 K/UL (ref 1.1–3.7)
LYMPHOCYTES RELATIVE PERCENT: 25 % (ref 24–43)
MCH RBC QN AUTO: 30.9 PG (ref 25.2–33.5)
MCHC RBC AUTO-ENTMCNC: 33.6 G/DL (ref 25.2–33.5)
MCV RBC AUTO: 91.8 FL (ref 82.6–102.9)
MONOCYTES NFR BLD: 0.29 K/UL (ref 0.1–1.2)
MONOCYTES NFR BLD: 6 % (ref 3–12)
NEUTROPHILS NFR BLD: 63 % (ref 36–65)
NEUTS SEG NFR BLD: 2.82 K/UL (ref 1.5–8.1)
NRBC BLD-RTO: 0 PER 100 WBC
PLATELET # BLD AUTO: 183 K/UL (ref 138–453)
PMV BLD AUTO: 9.2 FL (ref 8.1–13.5)
POTASSIUM SERPL-SCNC: 4.3 MMOL/L (ref 3.7–5.3)
PROT SERPL-MCNC: 6.5 G/DL (ref 6.4–8.3)
RBC # BLD AUTO: 4.86 M/UL (ref 3.95–5.11)
SODIUM SERPL-SCNC: 144 MMOL/L (ref 135–144)
TRIGL SERPL-MCNC: 76 MG/DL
TSH SERPL DL<=0.05 MIU/L-ACNC: 3.36 UIU/ML (ref 0.3–5)
VLDLC SERPL CALC-MCNC: 15 MG/DL
WBC OTHER # BLD: 4.5 K/UL (ref 3.5–11.3)

## 2024-01-04 PROCEDURE — 82306 VITAMIN D 25 HYDROXY: CPT

## 2024-01-04 PROCEDURE — 83036 HEMOGLOBIN GLYCOSYLATED A1C: CPT

## 2024-01-04 PROCEDURE — 80053 COMPREHEN METABOLIC PANEL: CPT

## 2024-01-04 PROCEDURE — 85025 COMPLETE CBC W/AUTO DIFF WBC: CPT

## 2024-01-04 PROCEDURE — 36415 COLL VENOUS BLD VENIPUNCTURE: CPT

## 2024-01-04 PROCEDURE — 84443 ASSAY THYROID STIM HORMONE: CPT

## 2024-01-04 PROCEDURE — 80061 LIPID PANEL: CPT

## 2024-01-18 ENCOUNTER — OFFICE VISIT (OUTPATIENT)
Dept: INTERNAL MEDICINE | Age: 69
End: 2024-01-18
Payer: MEDICARE

## 2024-01-18 ENCOUNTER — TELEPHONE (OUTPATIENT)
Dept: SURGERY | Age: 69
End: 2024-01-18

## 2024-01-18 VITALS
HEART RATE: 76 BPM | DIASTOLIC BLOOD PRESSURE: 70 MMHG | BODY MASS INDEX: 27.31 KG/M2 | WEIGHT: 160 LBS | HEIGHT: 64 IN | SYSTOLIC BLOOD PRESSURE: 110 MMHG | RESPIRATION RATE: 16 BRPM

## 2024-01-18 DIAGNOSIS — G51.0 BELL'S PALSY: ICD-10-CM

## 2024-01-18 DIAGNOSIS — M85.80 OSTEOPENIA, UNSPECIFIED LOCATION: ICD-10-CM

## 2024-01-18 DIAGNOSIS — E55.9 VITAMIN D DEFICIENCY: ICD-10-CM

## 2024-01-18 DIAGNOSIS — D12.6 SERRATED ADENOMA OF COLON: ICD-10-CM

## 2024-01-18 DIAGNOSIS — R73.02 IMPAIRED GLUCOSE TOLERANCE: ICD-10-CM

## 2024-01-18 DIAGNOSIS — B86 SCABIES: ICD-10-CM

## 2024-01-18 DIAGNOSIS — I44.4 LEFT ANTERIOR FASCICULAR BLOCK: ICD-10-CM

## 2024-01-18 DIAGNOSIS — N95.1 MENOPAUSAL SYNDROME: ICD-10-CM

## 2024-01-18 DIAGNOSIS — E78.5 DYSLIPIDEMIA: ICD-10-CM

## 2024-01-18 DIAGNOSIS — R29.810 FACIAL DROOP: ICD-10-CM

## 2024-01-18 DIAGNOSIS — Z00.00 MEDICARE ANNUAL WELLNESS VISIT, SUBSEQUENT: Primary | ICD-10-CM

## 2024-01-18 DIAGNOSIS — E03.9 HYPOTHYROIDISM, UNSPECIFIED TYPE: ICD-10-CM

## 2024-01-18 PROBLEM — K37 APPENDICITIS: Status: RESOLVED | Noted: 2022-10-20 | Resolved: 2024-01-18

## 2024-01-18 PROCEDURE — G0439 PPPS, SUBSEQ VISIT: HCPCS | Performed by: NURSE PRACTITIONER

## 2024-01-18 PROCEDURE — 99213 OFFICE O/P EST LOW 20 MIN: CPT | Performed by: NURSE PRACTITIONER

## 2024-01-18 PROCEDURE — 1123F ACP DISCUSS/DSCN MKR DOCD: CPT | Performed by: NURSE PRACTITIONER

## 2024-01-18 RX ORDER — PERMETHRIN 50 MG/G
CREAM TOPICAL
Qty: 60 G | Refills: 0 | Status: SHIPPED | OUTPATIENT
Start: 2024-01-18

## 2024-01-18 RX ORDER — PERMETHRIN 50 MG/G
CREAM TOPICAL
Qty: 60 G | Refills: 0 | Status: SHIPPED | OUTPATIENT
Start: 2024-01-18 | End: 2024-01-18 | Stop reason: SDUPTHER

## 2024-01-18 ASSESSMENT — LIFESTYLE VARIABLES
HOW OFTEN DURING THE LAST YEAR HAVE YOU FAILED TO DO WHAT WAS NORMALLY EXPECTED FROM YOU BECAUSE OF DRINKING: 0
HOW OFTEN DO YOU HAVE A DRINK CONTAINING ALCOHOL: 4 OR MORE TIMES A WEEK
HOW OFTEN DURING THE LAST YEAR HAVE YOU NEEDED AN ALCOHOLIC DRINK FIRST THING IN THE MORNING TO GET YOURSELF GOING AFTER A NIGHT OF HEAVY DRINKING: 0
HOW OFTEN DURING THE LAST YEAR HAVE YOU HAD A FEELING OF GUILT OR REMORSE AFTER DRINKING: 0
HAVE YOU OR SOMEONE ELSE BEEN INJURED AS A RESULT OF YOUR DRINKING: 0
HOW MANY STANDARD DRINKS CONTAINING ALCOHOL DO YOU HAVE ON A TYPICAL DAY: 1 OR 2
HOW OFTEN DURING THE LAST YEAR HAVE YOU BEEN UNABLE TO REMEMBER WHAT HAPPENED THE NIGHT BEFORE BECAUSE YOU HAD BEEN DRINKING: 0
HOW OFTEN DURING THE LAST YEAR HAVE YOU FOUND THAT YOU WERE NOT ABLE TO STOP DRINKING ONCE YOU HAD STARTED: 0
HAS A RELATIVE, FRIEND, DOCTOR, OR ANOTHER HEALTH PROFESSIONAL EXPRESSED CONCERN ABOUT YOUR DRINKING OR SUGGESTED YOU CUT DOWN: 0

## 2024-01-18 ASSESSMENT — PATIENT HEALTH QUESTIONNAIRE - PHQ9
SUM OF ALL RESPONSES TO PHQ QUESTIONS 1-9: 0
2. FEELING DOWN, DEPRESSED OR HOPELESS: 0
1. LITTLE INTEREST OR PLEASURE IN DOING THINGS: 0
SUM OF ALL RESPONSES TO PHQ QUESTIONS 1-9: 0
SUM OF ALL RESPONSES TO PHQ QUESTIONS 1-9: 0
SUM OF ALL RESPONSES TO PHQ9 QUESTIONS 1 & 2: 0
SUM OF ALL RESPONSES TO PHQ QUESTIONS 1-9: 0

## 2024-01-18 ASSESSMENT — ENCOUNTER SYMPTOMS
SORE THROAT: 0
VOMITING: 0
DIARRHEA: 0
CHEST TIGHTNESS: 0
SHORTNESS OF BREATH: 0
NAUSEA: 0

## 2024-01-18 NOTE — PROGRESS NOTES
Medicare Annual Wellness Visit    Qian Calvillo is here for Medicare AWV, Hyperlipidemia (6 month appt), and Blood Sugar Problem (IFG- 6 month appt)    Assessment & Plan   Medicare annual wellness visit, subsequent  Impaired glucose tolerance  Dyslipidemia  Left anterior fascicular block  Hypothyroidism, unspecified type  Vitamin D deficiency  Osteopenia, unspecified location  Menopausal syndrome  Bell's palsy  Facial droop  Serrated adenoma of colon    Recommendations for Preventive Services Due: see orders and patient instructions/AVS.  Recommended screening schedule for the next 5-10 years is provided to the patient in written form: see Patient Instructions/AVS.     No follow-ups on file.     Subjective       Patient's complete Health Risk Assessment and screening values have been reviewed and are found in Flowsheets. The following problems were reviewed today and where indicated follow up appointments were made and/or referrals ordered.    No Positive Risk Factors identified today.                                  Objective   Vitals:    01/18/24 0736   BP: 110/70   Site: Right Upper Arm   Position: Sitting   Cuff Size: Medium Adult   Pulse: 76   Resp: 16   Weight: 72.6 kg (160 lb)   Height: 1.626 m (5' 4\")      Body mass index is 27.46 kg/m².      See additional documentation       Allergies   Allergen Reactions    Ciprofloxacin Nausea Only    Sulfa Antibiotics Hives and Rash     Prior to Visit Medications    Medication Sig Taking? Authorizing Provider   NONFORMULARY FloraSure (probiotic)- 1 daily Yes ProviderLibby MD   CRANBERRY PO Take by mouth With Mannose- 900 mg 1 po bid Yes ProviderLibby MD   NONFORMULARY Vectomega [place] Omega 3- 1 po daily Yes Libby Lr MD   NONFORMULARY BosMed 500 mg 1 po bid Yes Libby Lr MD   NONFORMULARY CuraMed (Curcumin) 750 mg 1 po daily Yes ProviderLibby MD   rosuvastatin (CRESTOR) 5 MG tablet TAKE 1 TABLET NIGHTLY Yes Ryann

## 2024-01-18 NOTE — PROGRESS NOTES
[facility]  [address] [id number]  [phone]      Qian Calvillo c/o of Medicare AWV, Hyperlipidemia (6 month appt), and Blood Sugar Problem (IFG- 6 month appt)      HPI:     HPI  Patient also presents for evaluation and management of chronic medical conditions as noted below.    Impaired fasting glucose is well controlled with diet A1C 5.3    Dyslipidemia stable on crestor, ASCVD 7.5%    Follows with cardiology left anterior fascicular block.     Hypothyroidism is well controlled without medication, TSH 3.36    Vitamin D deficiency well-controlled on current dose of supplement.     Continues on calcium supplement for osteopenia, most recent DEXA 11/2020.  Also follows with OB/GYN for menopausal symptoms.    Stable residual symptoms from Bell's palsy.     Appendectomy 10/2022.  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  She was advised to repeat colonoscopy in 2 years due to finding of serrated adenoma    Has noted intermittent rash on arms and legs. Starts as a small papule, very pruritic. Started on R wrist after working with bird feeder, spread elsewhere.  also has some areas of concern. Discussed differential of scabies, did have some burrowing at one time      The 10-year ASCVD risk score (Muriel RAYMOND, et al., 2019) is: 7.5%    Values used to calculate the score:      Age: 69 years      Sex: Female      Is Non- : No      Diabetic: No      Tobacco smoker: No      Systolic Blood Pressure: 110 mmHg      Is BP treated: No      HDL Cholesterol: 70 mg/dL      Total Cholesterol: 187 mg/dL      Current Outpatient Medications   Medication Sig Dispense Refill    NONFORMULARY FloraSure (probiotic)- 1 daily      CRANBERRY PO Take by mouth With Mannose- 900 mg 1 po bid      NONFORMULARY Vectomega [place] Omega 3- 1 po daily      NONFORMULARY

## 2024-01-22 ENCOUNTER — TELEPHONE (OUTPATIENT)
Dept: SURGERY | Age: 69
End: 2024-01-22

## 2024-01-22 NOTE — TELEPHONE ENCOUNTER
[facility]  General Surgery  Phone # [phone]  Fax # [phone]    Patient:Qian Calvillo    :1955     Surgical/Procedure Planned: Colonoscopy     Date & Location: [facility]       Outpatient   Planned Length of OR: 30 min.    Sedation: intravenous sedation      Estimated Cardiac Risk for Non-Cardiac Surgery/Procedure     Low______ Moderate__x____ High______    Medication Instructions - Clarification needed by this date: ASAP    ASA 81 mg       Hold _7__ Days    Resume medications:     Lovenox indicated: _____Yes ___x__NO      Signature of Provider Giving Orders for Medication holds:    __Electronically signed by SCOTT Hdez CNP on 2024 at 8:29 AM  ___________________________________________

## 2024-01-23 RX ORDER — POLYETHYLENE GLYCOL 3350, SODIUM SULFATE ANHYDROUS, SODIUM BICARBONATE, SODIUM CHLORIDE, POTASSIUM CHLORIDE 236; 22.74; 6.74; 5.86; 2.97 G/4L; G/4L; G/4L; G/4L; G/4L
POWDER, FOR SOLUTION ORAL
Qty: 4000 ML | Refills: 0 | Status: SHIPPED | OUTPATIENT
Start: 2024-01-23

## 2024-01-23 RX ORDER — BISACODYL 5 MG/1
TABLET, DELAYED RELEASE ORAL
Qty: 2 TABLET | Refills: 0 | Status: SHIPPED | OUTPATIENT
Start: 2024-01-23

## 2024-03-06 ENCOUNTER — TELEPHONE (OUTPATIENT)
Dept: SURGERY | Age: 69
End: 2024-03-06

## 2024-03-06 NOTE — TELEPHONE ENCOUNTER
Patient called stating she is scheduled for a colonoscopy next week with Dr Corona.  She is aware that she cannot take herbal supplements for her arthritis, but she is inquiring what she is able to take for pain.  She is aware also to stop her baby ASA.  Call her back at [phone]

## 2024-03-12 NOTE — H&P
Subjective   Qian Calvillo is a 69 y.o. female who presents today for repeat screening colonoscopy.  Last was in 2019 but then in 2022 had appendectomy with finding of serrated adenoma.  Was recommended to have repeat colonoscopy 5 years after prior.  Denies any recent changes in bowel movements, blood per rectum, melena or unintended weight loss.  No reported family hx of colon cancer.    Past Medical History:   Diagnosis Date    Appendicitis 10/20/2022    Hypercholesterolemia     Hypothyroidism     Subacute, not currently on treeatment.    Impaired glucose tolerance     Prediabetes/stable and checking A1C yearly    Macular degeneration     Menopausal syndrome     Mitral regurgitation     Echo 1/19 mild MR    Osteopenia     Dexa 8/18 FRAX 1% at hip, T -1.5 Hip    Vitamin D deficiency        Past Surgical History:   Procedure Laterality Date    BLADDER SUSPENSION      In the past for a cystocele.    BREAST BIOPSY Right     Negative.    COLONOSCOPY N/A 3/12/2019    COLONOSCOPY performed by Sam Colon DO at [facility] OR    CYSTOSCOPY      Was done due to a cystocele several years ago and was negagtive.    DILATION AND CURETTAGE OF UTERUS      With hysteroscopy.    ENDOMETRIAL ABLATION      At age 49.    LAPAROSCOPIC APPENDECTOMY N/A 10/20/2022    Laparoscopic Robotic Assisted Appendectomy performed by George Corona DO at [facility] OR    SIGMOIDOSCOPY      At age 53. Was negative per patien, although she did not have a followup barium enema with this for unknown reasons.    TUBAL LIGATION         No current facility-administered medications for this encounter.     Current Outpatient Medications   Medication Sig Dispense Refill    polyethylene glycol (GOLYTELY) 236 g solution Mix as directed. At 4pm the day prior to your procedure, drink an 8oz glass every 10 minutes until gone. 4000 mL 0    bisacodyl 5 MG EC tablet Mix as directed. At 4pm the day prior to your procedure, drink an 8oz glass every 10 minutes until gone.  Reactions    Ciprofloxacin Nausea Only    Sulfa Antibiotics Hives and Rash       Family History   Problem Relation Age of Onset    Alzheimer's Disease Mother     Diabetes Mother         Diet-controlled    Hypertension Father     High Cholesterol Father     Stroke Father     Coronary Art Dis Father          - arterial sclerosis    Heart Attack Father     High Cholesterol Brother     Cancer Brother     Other Child         Ankylosing spondylitis who takes Humira and sees rheumatology regularly.       Social History     Socioeconomic History    Marital status:      Spouse name: Not on file    Number of children: Not on file    Years of education: Not on file    Highest education level: Not on file   Occupational History    Occupation: school counselor     Employer: [employer]   Tobacco Use    Smoking status: Never    Smokeless tobacco: Never    Tobacco comments:     exposed to 2nd hand smoke as child   Vaping Use    Vaping Use: Never used   Substance and Sexual Activity    Alcohol use: Yes     Alcohol/week: 0.0 standard drinks of alcohol     Comment: Half to 1 glass of red wine daily.    Drug use: No    Sexual activity: Not on file   Other Topics Concern    Not on file   Social History Narrative    Not on file     Social Determinants of Health     Financial Resource Strain: Low Risk  (2023)    Overall Financial Resource Strain (CARDIA)     Difficulty of Paying Living Expenses: Not hard at all   Food Insecurity: Not on file (2023)   Transportation Needs: Unknown (2023)    PRAPARE - Transportation     Lack of Transportation (Medical): Not on file     Lack of Transportation (Non-Medical): No   Physical Activity: Sufficiently Active (2024)    Exercise Vital Sign     Days of Exercise per Week: 7 days     Minutes of Exercise per Session: 60 min   Stress: Not on file   Social Connections: Not on file   Intimate Partner Violence: Not At Risk (2023)    Humiliation, Afraid, Rape, and

## 2024-03-13 ENCOUNTER — ANESTHESIA (OUTPATIENT)
Dept: OPERATING ROOM | Age: 69
End: 2024-03-13
Payer: MEDICARE

## 2024-03-13 ENCOUNTER — ANESTHESIA EVENT (OUTPATIENT)
Dept: OPERATING ROOM | Age: 69
End: 2024-03-13
Payer: MEDICARE

## 2024-03-13 ENCOUNTER — HOSPITAL ENCOUNTER (OUTPATIENT)
Age: 69
Setting detail: OUTPATIENT SURGERY
Discharge: HOME | End: 2024-03-13
Attending: SURGERY | Admitting: SURGERY
Payer: MEDICARE

## 2024-03-13 VITALS
BODY MASS INDEX: 26.8 KG/M2 | DIASTOLIC BLOOD PRESSURE: 70 MMHG | SYSTOLIC BLOOD PRESSURE: 144 MMHG | HEART RATE: 77 BPM | OXYGEN SATURATION: 96 % | HEIGHT: 64 IN | RESPIRATION RATE: 20 BRPM | WEIGHT: 157 LBS | TEMPERATURE: 96.8 F

## 2024-03-13 DIAGNOSIS — Z12.11 COLON CANCER SCREENING: ICD-10-CM

## 2024-03-13 PROCEDURE — 7100000011 HC PHASE II RECOVERY - ADDTL 15 MIN: Performed by: SURGERY

## 2024-03-13 PROCEDURE — 7100000010 HC PHASE II RECOVERY - FIRST 15 MIN: Performed by: SURGERY

## 2024-03-13 PROCEDURE — 3700000000 HC ANESTHESIA ATTENDED CARE: Performed by: SURGERY

## 2024-03-13 PROCEDURE — 6360000002 HC RX W HCPCS: Performed by: NURSE ANESTHETIST, CERTIFIED REGISTERED

## 2024-03-13 PROCEDURE — 2709999900 HC NON-CHARGEABLE SUPPLY: Performed by: SURGERY

## 2024-03-13 PROCEDURE — 2500000003 HC RX 250 WO HCPCS: Performed by: NURSE ANESTHETIST, CERTIFIED REGISTERED

## 2024-03-13 PROCEDURE — 2580000003 HC RX 258: Performed by: SURGERY

## 2024-03-13 PROCEDURE — 88305 TISSUE EXAM BY PATHOLOGIST: CPT

## 2024-03-13 PROCEDURE — 45385 COLONOSCOPY W/LESION REMOVAL: CPT | Performed by: SURGERY

## 2024-03-13 PROCEDURE — 3609010600 HC COLONOSCOPY POLYPECTOMY SNARE/COLD BIOPSY: Performed by: SURGERY

## 2024-03-13 PROCEDURE — 3700000001 HC ADD 15 MINUTES (ANESTHESIA): Performed by: SURGERY

## 2024-03-13 RX ORDER — SODIUM CHLORIDE 9 MG/ML
INJECTION, SOLUTION INTRAVENOUS PRN
Status: DISCONTINUED | OUTPATIENT
Start: 2024-03-13 | End: 2024-03-13 | Stop reason: HOSPADM

## 2024-03-13 RX ORDER — VARENICLINE 0.03 MG/.05ML
1 SPRAY NASAL 2 TIMES DAILY
COMMUNITY

## 2024-03-13 RX ORDER — SODIUM CHLORIDE 0.9 % (FLUSH) 0.9 %
5-40 SYRINGE (ML) INJECTION EVERY 12 HOURS SCHEDULED
Status: DISCONTINUED | OUTPATIENT
Start: 2024-03-13 | End: 2024-03-13 | Stop reason: HOSPADM

## 2024-03-13 RX ORDER — PROPOFOL 10 MG/ML
INJECTION, EMULSION INTRAVENOUS PRN
Status: DISCONTINUED | OUTPATIENT
Start: 2024-03-13 | End: 2024-03-13 | Stop reason: SDUPTHER

## 2024-03-13 RX ORDER — LIDOCAINE HYDROCHLORIDE 10 MG/ML
INJECTION, SOLUTION EPIDURAL; INFILTRATION; INTRACAUDAL; PERINEURAL PRN
Status: DISCONTINUED | OUTPATIENT
Start: 2024-03-13 | End: 2024-03-13 | Stop reason: SDUPTHER

## 2024-03-13 RX ORDER — SODIUM CHLORIDE, SODIUM LACTATE, POTASSIUM CHLORIDE, CALCIUM CHLORIDE 600; 310; 30; 20 MG/100ML; MG/100ML; MG/100ML; MG/100ML
INJECTION, SOLUTION INTRAVENOUS CONTINUOUS
Status: DISCONTINUED | OUTPATIENT
Start: 2024-03-13 | End: 2024-03-13 | Stop reason: HOSPADM

## 2024-03-13 RX ORDER — SODIUM CHLORIDE 0.9 % (FLUSH) 0.9 %
5-40 SYRINGE (ML) INJECTION PRN
Status: DISCONTINUED | OUTPATIENT
Start: 2024-03-13 | End: 2024-03-13 | Stop reason: HOSPADM

## 2024-03-13 RX ADMIN — LIDOCAINE HYDROCHLORIDE 50 MG: 10 INJECTION, SOLUTION EPIDURAL; INFILTRATION; INTRACAUDAL; PERINEURAL at 07:47

## 2024-03-13 RX ADMIN — PROPOFOL 75 MG: 10 INJECTION, EMULSION INTRAVENOUS at 07:47

## 2024-03-13 RX ADMIN — SODIUM CHLORIDE, POTASSIUM CHLORIDE, SODIUM LACTATE AND CALCIUM CHLORIDE: 600; 310; 30; 20 INJECTION, SOLUTION INTRAVENOUS at 07:27

## 2024-03-13 RX ADMIN — PROPOFOL 150 MCG/KG/MIN: 10 INJECTION, EMULSION INTRAVENOUS at 07:48

## 2024-03-13 ASSESSMENT — PAIN - FUNCTIONAL ASSESSMENT: PAIN_FUNCTIONAL_ASSESSMENT: 0-10

## 2024-03-13 ASSESSMENT — PAIN SCALES - GENERAL
PAINLEVEL_OUTOF10: 0
PAINLEVEL_OUTOF10: 0

## 2024-03-13 NOTE — ANESTHESIA POSTPROCEDURE EVALUATION
Department of Anesthesiology  Postprocedure Note    Patient: Qian Calvillo  MRN: [id number]  YOB: 1955  Date of evaluation: 3/13/2024    Procedure Summary       Date: 03/13/24 Room / Location: [facility]    Anesthesia Start: 0745 Anesthesia Stop: 0808    Procedure: COLONOSCOPY POLYPECTOMY SNARE Diagnosis:       Colon cancer screening      (Colon cancer screening [Z12.11])    Surgeons: George Corona DO Responsible Provider: Zachery Hansen II, APRN - CRNA    Anesthesia Type: General, TIVA ASA Status: 2 - Emergent            Anesthesia Type: General, TIVA    Horace Phase I: Horace Score: 10    Horace Phase II: Horace Score: 9    Anesthesia Post Evaluation    Patient location during evaluation: bedside  Patient participation: complete - patient participated  Level of consciousness: awake  Pain score: 0  Airway patency: patent  Nausea & Vomiting: no nausea and no vomiting  Cardiovascular status: hemodynamically stable  Respiratory status: spontaneous ventilation  Hydration status: stable  Pain management: satisfactory to patient    No notable events documented.

## 2024-03-13 NOTE — ANESTHESIA PRE PROCEDURE
Department of Anesthesiology  Preprocedure Note       Name:  Qian Calvillo   Age:  69 y.o.  :  1955                                          MRN:  [id number]         Date:  3/13/2024      Surgeon: Surgeon(s):  George Corona DO    Procedure: Procedure(s):  COLONOSCOPY    Medications prior to admission:   Prior to Admission medications    Medication Sig Start Date End Date Taking? Authorizing Provider   Varenicline Tartrate (TYRVAYA) 0.03 MG/ACT SOLN 1 spray by Nasal route 2 times daily   Yes Libby Lr MD   polyethylene glycol (GOLYTELY) 236 g solution Mix as directed. At 4pm the day prior to your procedure, drink an 8oz glass every 10 minutes until gone. 24   George Corona DO   bisacodyl 5 MG EC tablet Mix as directed. At 4pm the day prior to your procedure, drink an 8oz glass every 10 minutes until gone. 24   George Corona DO   Omega-3 Fatty Acids (FISH OIL) 1000 MG capsule Take 1 capsule by mouth daily    Libby Lr MD   turmeric (CURCUMIN 95) 500 MG CAPS Take 750 mg by mouth daily CuraMed    Libby Lr MD   Boswellia Heidi (BOSWELLIA PO) Take 1 capsule by mouth daily    Libby Lr MD   NONFORMMARQUES FloraSure (probiotic)- 1 daily    Libby Lr MD   CRANBERRY PO Take by mouth With Mannose- 900 mg 1 po bid    Libby Lr MD NONFORMMARQUES BosMed 500 mg 1 po bid    Libby Lr MD   rosuvastatin (CRESTOR) 5 MG tablet TAKE 1 TABLET NIGHTLY 23   Lauren Garzon, APRN - CNP   MILK THISTLE PO Take 600 mg by mouth in the morning and at bedtime    Libby Lr MD   NONFORMULARY Focus Factor, 4 tablets daily multivitamin    Libby Lr MD   NONFORMULARY Iodine 2%- 6 drops by mouth every morning (re-started 23 per [place] message)    Libby Lr MD NONFORMULARY Calcium Bone Maker Complex- (300 mg Calcium, with 23 mcg Vit D) - 3 caps daily    Libby Lr MD

## 2024-03-13 NOTE — PROCEDURES
[facility]                [address] [place] [address]                             PROCEDURE NOTE      PATIENT NAME: ONEIDA GRIMES             : 1955  MED REC NO: [id number]                         ROOM: MD OR [place]  ACCOUNT NO: [id number]                       ADMIT DATE: 2024  PROVIDER: George Corona DO      DATE OF PROCEDURE:  2024    SURGEON:  George Corona DO    ASSISTANT:  None.    PREOPERATIVE DIAGNOSES:    1. Screening.  2. History of colon polyps.    POSTOPERATIVE DIAGNOSES:    1. Screening.  2. History of colon polyps.  3. Colon polyp.  4. Diverticulosis.  5. Internal hemorrhoids.    PROCEDURE:  Colonoscopy with hot snare polypectomy.    ANESTHESIA:  MAC.    ESTIMATED BLOOD LOSS:  Minimal.    FLUIDS:  Per Anesthesia record.    COMPLICATIONS:  None.    SPECIMENS:  Polyp in ascending colon, removed with hot snare.    INDICATIONS FOR PROCEDURE:  The patient is a 69-year-old female who has previously undergone colonoscopy.  She then had an appendectomy that showed a polyp in the lumen of the appendix, and so she was recommended to have a repeat colonoscopy 5 years after her prior.  Prior to the day of the procedure, risks, benefits, and alternatives were explained to the patient.  Consent was obtained.    DESCRIPTION OF PROCEDURE:  The patient was brought to endoscopy suite, kept on preop gurney, placed in left lateral decubitus position.  Monitoring devices were placed.  MAC anesthesia was induced.  After induction of anesthesia, a time-out was performed and correct patient, procedure were verified.  Digital rectal exam was performed, which showed no abnormalities.  The Olympus video endoscope was lubricated, inserted into the patient's rectum, which was gently insufflated with air.  Scope was then slowly advanced through colon under visualization at the level of the cecum, which was identified by appendiceal orifice and ileocecal  valve.  During the advancement of scope, bowel prep was adequate.  Scope was withdrawn through the colon with withdrawal time being greater than 7 minutes.  During this time, colonic mucosa was carefully inspected.  There was a polyp in the ascending colon, and that was removed with hot snare and suctioned through the scope as specimen.  No other masses or polyps in the colon.  In the sigmoid colon, there was diverticulosis present, but no evidence of any inflammation.  Once in the rectum, retroflex view showed internal hemorrhoids, but no other abnormalities.  The scope was straightened and removed, and procedure was concluded.  At conclusion of procedure, the patient was in stable condition, was taken to PACU for recovery.    PLAN:  We will follow up results of pathology and make final recommendations at that time.          DULCE RODRIGUEZ DO      D:  03/13/2024 08:10:43     T:  03/13/2024 08:54:55     ANA/CRISTEL  Job #:  [id number]     Doc#:  [id number]    CC:   Primary Care

## 2024-03-15 LAB — SURGICAL PATHOLOGY REPORT: NORMAL

## 2024-03-20 PROBLEM — Z12.11 SCREEN FOR COLON CANCER: Status: ACTIVE | Noted: 2024-03-20

## 2024-03-20 PROBLEM — Z12.11 COLON CANCER SCREENING: Status: ACTIVE | Noted: 2024-03-20

## 2024-03-29 DIAGNOSIS — E78.5 DYSLIPIDEMIA: ICD-10-CM

## 2024-04-01 ENCOUNTER — PATIENT MESSAGE (OUTPATIENT)
Dept: INTERNAL MEDICINE | Age: 69
End: 2024-04-01

## 2024-04-01 DIAGNOSIS — E78.5 DYSLIPIDEMIA: ICD-10-CM

## 2024-04-01 RX ORDER — ROSUVASTATIN CALCIUM 5 MG/1
5 TABLET, COATED ORAL NIGHTLY
Qty: 90 TABLET | Refills: 3 | Status: SHIPPED | OUTPATIENT
Start: 2024-04-01 | End: 2024-04-01 | Stop reason: SDUPTHER

## 2024-04-01 RX ORDER — ROSUVASTATIN CALCIUM 5 MG/1
5 TABLET, COATED ORAL NIGHTLY
Qty: 90 TABLET | Refills: 3 | Status: SHIPPED | OUTPATIENT
Start: 2024-04-01

## 2024-04-01 NOTE — TELEPHONE ENCOUNTER
From: Qian Calvillo  To: Lauren Garzon  Sent: 4/1/2024 1:50 PM EDT  Subject: Rosuvastatin     Scot Aguilar… our new mail in prescription pharmacy is [facility]. I will need a prescription called in there. Hopefully it goes smoothly!

## 2024-04-19 PROBLEM — Z12.11 SCREEN FOR COLON CANCER: Status: RESOLVED | Noted: 2024-03-20 | Resolved: 2024-04-19

## 2024-04-19 PROBLEM — Z12.11 COLON CANCER SCREENING: Status: RESOLVED | Noted: 2024-03-20 | Resolved: 2024-04-19

## 2024-07-17 ENCOUNTER — TELEPHONE (OUTPATIENT)
Dept: INTERNAL MEDICINE | Age: 69
End: 2024-07-17

## 2024-07-17 ENCOUNTER — HOSPITAL ENCOUNTER (OUTPATIENT)
Age: 69
Setting detail: SPECIMEN
Discharge: HOME | End: 2024-07-17
Payer: MEDICARE

## 2024-07-17 ENCOUNTER — HOSPITAL ENCOUNTER (OUTPATIENT)
Age: 69
Discharge: HOME | End: 2024-07-17
Payer: MEDICARE

## 2024-07-17 DIAGNOSIS — R30.0 BURNING WITH URINATION: Primary | ICD-10-CM

## 2024-07-17 DIAGNOSIS — N39.0 URINARY TRACT INFECTION WITHOUT HEMATURIA, SITE UNSPECIFIED: Primary | ICD-10-CM

## 2024-07-17 DIAGNOSIS — N39.0 URINARY TRACT INFECTION WITHOUT HEMATURIA, SITE UNSPECIFIED: ICD-10-CM

## 2024-07-17 DIAGNOSIS — R30.0 BURNING WITH URINATION: ICD-10-CM

## 2024-07-17 LAB
BACTERIA URNS QL MICRO: ABNORMAL
BILIRUB UR QL STRIP: NEGATIVE
CHARACTER UR: ABNORMAL
CLARITY UR: ABNORMAL
COLOR UR: ABNORMAL
EPI CELLS #/AREA URNS HPF: ABNORMAL /HPF (ref 0–5)
GLUCOSE UR STRIP-MCNC: NEGATIVE MG/DL
HGB UR QL STRIP.AUTO: ABNORMAL
KETONES UR STRIP-MCNC: ABNORMAL MG/DL
LEUKOCYTE ESTERASE UR QL STRIP: ABNORMAL
NITRITE UR QL STRIP: POSITIVE
PH UR STRIP: 6.5 [PH] (ref 5–6)
PROT UR STRIP-MCNC: ABNORMAL MG/DL
RBC #/AREA URNS HPF: ABNORMAL /HPF (ref 0–4)
SP GR UR STRIP: 1.02 (ref 1.01–1.02)
UROBILINOGEN UR STRIP-ACNC: NORMAL EU/DL (ref 0–1)
WBC #/AREA URNS HPF: ABNORMAL /HPF (ref 0–4)

## 2024-07-17 PROCEDURE — 81001 URINALYSIS AUTO W/SCOPE: CPT

## 2024-07-17 PROCEDURE — 87186 SC STD MICRODIL/AGAR DIL: CPT

## 2024-07-17 PROCEDURE — 87086 URINE CULTURE/COLONY COUNT: CPT

## 2024-07-17 PROCEDURE — 87088 URINE BACTERIA CULTURE: CPT

## 2024-07-17 RX ORDER — CEPHALEXIN 500 MG/1
500 CAPSULE ORAL 2 TIMES DAILY
Qty: 20 CAPSULE | Refills: 0 | Status: SHIPPED | OUTPATIENT
Start: 2024-07-17 | End: 2024-07-27

## 2024-07-17 RX ORDER — CEPHALEXIN 500 MG/1
500 CAPSULE ORAL 2 TIMES DAILY
Qty: 20 CAPSULE | Refills: 0 | Status: SHIPPED | OUTPATIENT
Start: 2024-07-17 | End: 2024-07-17 | Stop reason: SDUPTHER

## 2024-07-17 NOTE — TELEPHONE ENCOUNTER
Qian states Geneva is having trouble with her Insurance getting antibiotic to go thru and she spoke with our [facility] and they said they can get it thru for her here. Please resend rx to [facility] and call her when switched please [phone]

## 2024-07-17 NOTE — TELEPHONE ENCOUNTER
Patient called in stating that she thinks she has  UTI again. Patient complains of Burning with urination, urinary frequency and itching. Patient is agreeable for a UA, and will bring in today. If agreeable patient would like something sent to [facility].       UA ordered

## 2024-07-18 ENCOUNTER — OFFICE VISIT (OUTPATIENT)
Dept: INTERNAL MEDICINE | Age: 69
End: 2024-07-18
Payer: MEDICARE

## 2024-07-18 VITALS
HEIGHT: 64 IN | SYSTOLIC BLOOD PRESSURE: 112 MMHG | WEIGHT: 156 LBS | RESPIRATION RATE: 16 BRPM | HEART RATE: 76 BPM | BODY MASS INDEX: 26.63 KG/M2 | DIASTOLIC BLOOD PRESSURE: 72 MMHG

## 2024-07-18 DIAGNOSIS — D12.6 SERRATED ADENOMA OF COLON: ICD-10-CM

## 2024-07-18 DIAGNOSIS — M85.80 OSTEOPENIA, UNSPECIFIED LOCATION: ICD-10-CM

## 2024-07-18 DIAGNOSIS — G51.0 BELL'S PALSY: ICD-10-CM

## 2024-07-18 DIAGNOSIS — N30.01 ACUTE CYSTITIS WITH HEMATURIA: ICD-10-CM

## 2024-07-18 DIAGNOSIS — E03.9 HYPOTHYROIDISM, UNSPECIFIED TYPE: ICD-10-CM

## 2024-07-18 DIAGNOSIS — E55.9 VITAMIN D DEFICIENCY: ICD-10-CM

## 2024-07-18 DIAGNOSIS — K63.5 POLYP OF COLON, UNSPECIFIED PART OF COLON, UNSPECIFIED TYPE: ICD-10-CM

## 2024-07-18 DIAGNOSIS — N95.1 MENOPAUSAL SYNDROME: ICD-10-CM

## 2024-07-18 DIAGNOSIS — R73.02 IMPAIRED GLUCOSE TOLERANCE: Primary | ICD-10-CM

## 2024-07-18 DIAGNOSIS — E78.5 DYSLIPIDEMIA: ICD-10-CM

## 2024-07-18 PROCEDURE — 99213 OFFICE O/P EST LOW 20 MIN: CPT | Performed by: NURSE PRACTITIONER

## 2024-07-18 ASSESSMENT — ENCOUNTER SYMPTOMS
NAUSEA: 0
CHEST TIGHTNESS: 0
VOMITING: 0
SHORTNESS OF BREATH: 0
DIARRHEA: 0
SORE THROAT: 0

## 2024-07-18 NOTE — PROGRESS NOTES
[facility]  [address] [place] [address]  [phone]      Qian Calvillo c/o of Hyperlipidemia (6 month appt), Impaired Fasting Glucose (6 month appt), and Urinary Tract Infection (UA yesterday- Cuca Dimas CNP treated with Keflex)      HPI:     HPI  Patient presents for evaluation and management of chronic medical conditions as noted below.    Impaired fasting glucose continues to be well-controlled with diet, A1c 5.3.    Continues on Crestor for dyslipidemia, which is stable, ASCVD 7.8%.    Follows with cardiology for left anterior fascicular block.    Hypothyroidism well-controlled, patient reports she has been taking iodine supplements.  With this, her TSH did decrease from 14.01 to 3.36.     Continues on supplement for vitamin D deficiency, which is well-controlled.    Continues on calcium supplement for osteopenia.  Discussed possible repeat DEXA, however, patient notes she would not take bisphosphonates or similar.  Given that this would not change our course of treatment, will defer DEXA at this time.    Continues to follow with OBGYN for menopausal symptoms    Stable residual symptoms from Bell's palsy.    History of colon polyps, most recent colonoscopy 03/2024, repeat recommended 5 years    Was started on cephalexin for UTI yesterday, notes her symptoms have been improving signficantly. On D-mannose and cranberry supplements. Discussed urology referral if more frequent recurrences    Did follow up with dermatology regarding skin lesions - was not scabies, as did not improve with permethrin. Was diagnosed with nonspecific dermatitis.     Reviewed health maintenance and recommended vaccinations.    The 10-year ASCVD risk score (Muriel RAYMOND, et al., 2019) is: 7.8%    Values used to calculate the score:      Age: 69 years      Sex: Female      Is Non- : No      Diabetic: No      Tobacco smoker: No      Systolic Blood Pressure: 112

## 2024-07-19 LAB
MICROORGANISM SPEC CULT: ABNORMAL
SERVICE CMNT-IMP: ABNORMAL
SPECIMEN DESCRIPTION: ABNORMAL

## 2024-07-22 ENCOUNTER — PATIENT MESSAGE (OUTPATIENT)
Dept: INTERNAL MEDICINE | Age: 69
End: 2024-07-22

## 2024-07-29 ENCOUNTER — PATIENT MESSAGE (OUTPATIENT)
Dept: INTERNAL MEDICINE | Age: 69
End: 2024-07-29

## 2024-07-29 RX ORDER — CEPHALEXIN 500 MG/1
500 CAPSULE ORAL 4 TIMES DAILY
Qty: 28 CAPSULE | Refills: 0 | Status: SHIPPED | OUTPATIENT
Start: 2024-07-29 | End: 2024-08-05

## 2024-07-29 NOTE — TELEPHONE ENCOUNTER
From: Qian Calvillo  To: Lauren Garzon  Sent: 7/29/2024 9:34 AM EDT  Subject: UTI    I finished my antibiotics on Friday. This morning I’m feeling the onset of another UTI . Not sure what you want me to do?

## 2024-07-31 ENCOUNTER — OFFICE VISIT (OUTPATIENT)
Dept: CARDIOLOGY | Age: 69
End: 2024-07-31
Payer: MEDICARE

## 2024-07-31 VITALS
HEART RATE: 81 BPM | DIASTOLIC BLOOD PRESSURE: 70 MMHG | SYSTOLIC BLOOD PRESSURE: 122 MMHG | WEIGHT: 156.2 LBS | OXYGEN SATURATION: 96 % | RESPIRATION RATE: 16 BRPM | HEIGHT: 64 IN | BODY MASS INDEX: 26.67 KG/M2

## 2024-07-31 DIAGNOSIS — R73.02 IMPAIRED GLUCOSE TOLERANCE: ICD-10-CM

## 2024-07-31 DIAGNOSIS — G51.0 BELL'S PALSY: ICD-10-CM

## 2024-07-31 DIAGNOSIS — I25.10 CORONARY ARTERY DISEASE INVOLVING NATIVE CORONARY ARTERY OF NATIVE HEART WITHOUT ANGINA PECTORIS: ICD-10-CM

## 2024-07-31 DIAGNOSIS — E78.00 PURE HYPERCHOLESTEROLEMIA: ICD-10-CM

## 2024-07-31 DIAGNOSIS — I10 HYPERTENSION, ESSENTIAL: ICD-10-CM

## 2024-07-31 DIAGNOSIS — E78.5 HYPERLIPIDEMIA, UNSPECIFIED HYPERLIPIDEMIA TYPE: Primary | ICD-10-CM

## 2024-07-31 DIAGNOSIS — E78.5 DYSLIPIDEMIA: ICD-10-CM

## 2024-07-31 PROCEDURE — 99212 OFFICE O/P EST SF 10 MIN: CPT | Performed by: INTERNAL MEDICINE

## 2024-07-31 PROCEDURE — 93010 ELECTROCARDIOGRAM REPORT: CPT | Performed by: INTERNAL MEDICINE

## 2024-07-31 PROCEDURE — 99214 OFFICE O/P EST MOD 30 MIN: CPT | Performed by: INTERNAL MEDICINE

## 2024-07-31 PROCEDURE — 1123F ACP DISCUSS/DSCN MKR DOCD: CPT | Performed by: INTERNAL MEDICINE

## 2024-07-31 PROCEDURE — 3074F SYST BP LT 130 MM HG: CPT | Performed by: INTERNAL MEDICINE

## 2024-07-31 PROCEDURE — 3078F DIAST BP <80 MM HG: CPT | Performed by: INTERNAL MEDICINE

## 2024-07-31 PROCEDURE — 93005 ELECTROCARDIOGRAM TRACING: CPT | Performed by: INTERNAL MEDICINE

## 2024-07-31 RX ORDER — EZETIMIBE 10 MG/1
10 TABLET ORAL DAILY
Qty: 90 TABLET | Refills: 3 | Status: SHIPPED | OUTPATIENT
Start: 2024-07-31

## 2024-07-31 NOTE — PROGRESS NOTES
Today's Date: 7/31/2024  Patient's Name: Qian Calvillo  Patient's age: 69 y.o., 1955    Subjective:  Qian Calvillo is being seen in clinic today regarding Abnormal ECG, hyperlipidemia    Patient is doing well from a cardiac standpoint. Good functional capacity with no significant change in functional capacity. No chest pain, no dyspnea, no PND, no syncope or pre-syncope, no orthopnea. No symptoms of CHF or angina/chest pain.      Past Medical History:   has a past medical history of Appendicitis, Hypercholesterolemia, Hypothyroidism, Impaired glucose tolerance, Macular degeneration, Menopausal syndrome, Mitral regurgitation, Osteopenia, and Vitamin D deficiency.    Past Surgical History:   has a past surgical history that includes Breast biopsy (Right); Dilation and curettage of uterus; Endometrial ablation; Sigmoidoscopy; Cystocopy; Tubal ligation; bladder suspension; Colonoscopy (N/A, 3/12/2019); laparoscopic appendectomy (N/A, 10/20/2022); and Colonoscopy (N/A, 3/13/2024).    Home Medications:  Prior to Admission medications    Medication Sig Start Date End Date Taking? Authorizing Provider   cephALEXin (KEFLEX) 500 MG capsule Take 1 capsule by mouth 4 times daily for 7 days 7/29/24 8/5/24 Yes Tomi Donovan MD   NONFORMULARY Vectomega [place] Omega 3- 1 po daily   Yes ProviderLibby MD   NONFORMULARY CuraMed (Curcumin 750 mg 1 po daily   Yes ProviderLibby MD   rosuvastatin (CRESTOR) 5 MG tablet Take 1 tablet by mouth nightly 4/1/24  Yes Lauren Garzon APRN - CNP   Varenicline Tartrate (TYRVAYA) 0.03 MG/ACT SOLN 1 spray by Nasal route 2 times daily   Yes ProviderLibby MD   NONFORMULARY PB8 (probiotic)- 755 mg po daily   Yes ProviderLibby MD   CRANBERRY PO Take by mouth With Mannose- 2 capsule (500 mg total) bid   Yes Libby Lr MD   NONFORMULARY BosMed 500 mg 1 po daily   Yes ProviderLibby MD   MILK THISTLE PO Take 300 mg by mouth in the

## 2024-08-05 ENCOUNTER — OFFICE VISIT (OUTPATIENT)
Dept: PRIMARY CARE CLINIC | Age: 69
End: 2024-08-05
Payer: MEDICARE

## 2024-08-05 ENCOUNTER — HOSPITAL ENCOUNTER (OUTPATIENT)
Dept: GENERAL RADIOLOGY | Age: 69
Discharge: HOME | End: 2024-08-07
Payer: MEDICARE

## 2024-08-05 ENCOUNTER — HOSPITAL ENCOUNTER (OUTPATIENT)
Age: 69
Discharge: HOME | End: 2024-08-05
Payer: MEDICARE

## 2024-08-05 ENCOUNTER — HOSPITAL ENCOUNTER (OUTPATIENT)
Age: 69
Setting detail: SPECIMEN
Discharge: HOME | End: 2024-08-05
Payer: MEDICARE

## 2024-08-05 VITALS
DIASTOLIC BLOOD PRESSURE: 70 MMHG | BODY MASS INDEX: 26.12 KG/M2 | HEIGHT: 64 IN | HEART RATE: 72 BPM | WEIGHT: 153 LBS | TEMPERATURE: 97.9 F | SYSTOLIC BLOOD PRESSURE: 122 MMHG | OXYGEN SATURATION: 98 %

## 2024-08-05 DIAGNOSIS — R35.0 FREQUENCY OF URINATION: ICD-10-CM

## 2024-08-05 DIAGNOSIS — R10.9 FLANK PAIN: ICD-10-CM

## 2024-08-05 DIAGNOSIS — R10.9 FLANK PAIN: Primary | ICD-10-CM

## 2024-08-05 LAB

## 2024-08-05 PROCEDURE — 74018 RADEX ABDOMEN 1 VIEW: CPT

## 2024-08-05 PROCEDURE — 87086 URINE CULTURE/COLONY COUNT: CPT

## 2024-08-05 PROCEDURE — 81001 URINALYSIS AUTO W/SCOPE: CPT

## 2024-08-05 PROCEDURE — 99212 OFFICE O/P EST SF 10 MIN: CPT

## 2024-08-05 ASSESSMENT — ENCOUNTER SYMPTOMS: NAUSEA: 0

## 2024-08-05 NOTE — PROGRESS NOTES
1    Coenzyme Q10 (CO Q-10) 300 MG CAPS Take 1 capsule by mouth daily      aspirin 81 MG tablet Take 1 tablet by mouth daily      GARLIC Take 320 mg by mouth daily.      Ascorbic Acid (VITAMIN C) 1000 MG tablet Take 1 tablet by mouth daily      Methylsulfonylmethane (MSM PO) Take 3,000 mg by mouth daily       No current facility-administered medications for this visit.     Allergies   Allergen Reactions    Ciprofloxacin Nausea Only    Sulfa Antibiotics Hives and Rash       All patients pastmedical, surgical, social and family history has been reviewed.    Subjective:      Review of Systems   Constitutional:  Negative for activity change, appetite change, fatigue and fever.   Cardiovascular:  Negative for chest pain and palpitations.   Gastrointestinal:  Negative for nausea.   Genitourinary:  Positive for dysuria, frequency and hematuria. Negative for urgency.         Objective:      Physical Exam  Vitals and nursing note reviewed.   Constitutional:       Appearance: Normal appearance.   HENT:      Head: Normocephalic and atraumatic.   Cardiovascular:      Rate and Rhythm: Normal rate and regular rhythm.      Heart sounds: Normal heart sounds.   Pulmonary:      Effort: Pulmonary effort is normal.      Breath sounds: Normal breath sounds.   Abdominal:      Tenderness: There is abdominal tenderness in the suprapubic area. There is no right CVA tenderness, left CVA tenderness, guarding or rebound.   Skin:     Capillary Refill: Capillary refill takes less than 2 seconds.   Neurological:      General: No focal deficit present.      Mental Status: She is alert and oriented to person, place, and time.       Hospital Outpatient Visit on 08/05/2024   Component Date Value Ref Range Status    Color, UA 08/05/2024 Yellow  Yellow Final    Turbidity UA 08/05/2024 Clear  Clear Final    Glucose, Ur 08/05/2024 NEGATIVE  NEGATIVE mg/dL Final    Bilirubin, Urine 08/05/2024 NEGATIVE  NEGATIVE Final    Ketones, Urine 08/05/2024 NEGATIVE

## 2024-08-06 LAB
MICROORGANISM SPEC CULT: NO GROWTH
SERVICE CMNT-IMP: NORMAL
SPECIMEN DESCRIPTION: NORMAL

## 2024-08-09 ENCOUNTER — HOSPITAL ENCOUNTER (OUTPATIENT)
Age: 69
Discharge: HOME | End: 2024-08-09
Payer: MEDICARE

## 2024-08-09 ENCOUNTER — TELEPHONE (OUTPATIENT)
Dept: INTERNAL MEDICINE | Age: 69
End: 2024-08-09

## 2024-08-09 DIAGNOSIS — R31.0 GROSS HEMATURIA: ICD-10-CM

## 2024-08-09 DIAGNOSIS — R30.0 DYSURIA: ICD-10-CM

## 2024-08-09 DIAGNOSIS — R30.0 DYSURIA: Primary | ICD-10-CM

## 2024-08-09 DIAGNOSIS — N39.0 RECURRENT UTI: ICD-10-CM

## 2024-08-09 LAB
BACTERIA URNS QL MICRO: ABNORMAL
BILIRUB UR QL STRIP: ABNORMAL
CHARACTER UR: ABNORMAL
CLARITY UR: ABNORMAL
COLOR UR: ABNORMAL
EPI CELLS #/AREA URNS HPF: ABNORMAL /HPF (ref 0–5)
GLUCOSE UR STRIP-MCNC: NEGATIVE MG/DL
HGB UR QL STRIP.AUTO: ABNORMAL
KETONES UR STRIP-MCNC: NEGATIVE MG/DL
LEUKOCYTE ESTERASE UR QL STRIP: ABNORMAL
NITRITE UR QL STRIP: NEGATIVE
PH UR STRIP: 5.5 [PH] (ref 5–6)
PROT UR STRIP-MCNC: ABNORMAL MG/DL
RBC #/AREA URNS HPF: ABNORMAL /HPF (ref 0–4)
SP GR UR STRIP: 1.02 (ref 1.01–1.02)
UROBILINOGEN UR STRIP-ACNC: NORMAL EU/DL (ref 0–1)
WBC #/AREA URNS HPF: ABNORMAL /HPF (ref 0–4)

## 2024-08-09 PROCEDURE — 81001 URINALYSIS AUTO W/SCOPE: CPT

## 2024-08-09 RX ORDER — CEPHALEXIN 500 MG/1
500 CAPSULE ORAL 4 TIMES DAILY
Qty: 28 CAPSULE | Refills: 0 | Status: SHIPPED | OUTPATIENT
Start: 2024-08-09 | End: 2024-08-16

## 2024-08-09 NOTE — TELEPHONE ENCOUNTER
Pend Rx for Keflex 500 mg twice daily x 7 days.    Also pend order to refer patient for urology consult for gross hematuria and recurrent UTIs

## 2024-08-09 NOTE — TELEPHONE ENCOUNTER
Patient complains of urinary symptoms as follows Urgency, chills, she is requesting an order for a urine.

## 2024-08-13 ENCOUNTER — HOSPITAL ENCOUNTER (OUTPATIENT)
Age: 69
Discharge: HOME | End: 2024-08-13
Payer: MEDICARE

## 2024-08-13 ENCOUNTER — PATIENT MESSAGE (OUTPATIENT)
Dept: INTERNAL MEDICINE | Age: 69
End: 2024-08-13

## 2024-08-13 DIAGNOSIS — N39.0 FREQUENT UTI: Primary | ICD-10-CM

## 2024-08-13 DIAGNOSIS — R30.0 DYSURIA: Primary | ICD-10-CM

## 2024-08-13 DIAGNOSIS — N39.0 FREQUENT UTI: ICD-10-CM

## 2024-08-13 DIAGNOSIS — R30.0 DYSURIA: ICD-10-CM

## 2024-08-13 LAB
BACTERIA URNS QL MICRO: ABNORMAL
BILIRUB UR QL STRIP: NEGATIVE
CHARACTER UR: ABNORMAL
CLARITY UR: CLEAR
COLOR UR: YELLOW
EPI CELLS #/AREA URNS HPF: ABNORMAL /HPF (ref 0–5)
GLUCOSE UR STRIP-MCNC: NEGATIVE MG/DL
HGB UR QL STRIP.AUTO: NEGATIVE
KETONES UR STRIP-MCNC: NEGATIVE MG/DL
LEUKOCYTE ESTERASE UR QL STRIP: ABNORMAL
NITRITE UR QL STRIP: NEGATIVE
PH UR STRIP: 6 [PH] (ref 5–6)
PROT UR STRIP-MCNC: NEGATIVE MG/DL
RBC #/AREA URNS HPF: ABNORMAL /HPF (ref 0–4)
SP GR UR STRIP: 1.02 (ref 1.01–1.02)
UROBILINOGEN UR STRIP-ACNC: NORMAL EU/DL (ref 0–1)
WBC #/AREA URNS HPF: ABNORMAL /HPF (ref 0–4)

## 2024-08-13 PROCEDURE — 87086 URINE CULTURE/COLONY COUNT: CPT

## 2024-08-13 PROCEDURE — 81001 URINALYSIS AUTO W/SCOPE: CPT

## 2024-08-14 LAB
MICROORGANISM SPEC CULT: NO GROWTH
SERVICE CMNT-IMP: NORMAL
SPECIMEN DESCRIPTION: NORMAL

## 2024-08-15 ENCOUNTER — PATIENT MESSAGE (OUTPATIENT)
Dept: INTERNAL MEDICINE | Age: 69
End: 2024-08-15

## 2024-08-29 ENCOUNTER — PATIENT MESSAGE (OUTPATIENT)
Dept: INTERNAL MEDICINE | Age: 69
End: 2024-08-29

## 2024-09-12 ENCOUNTER — HOSPITAL ENCOUNTER (OUTPATIENT)
Age: 69
Discharge: HOME | End: 2024-09-12
Payer: MEDICARE

## 2024-09-12 DIAGNOSIS — G51.0 BELL'S PALSY: ICD-10-CM

## 2024-09-12 DIAGNOSIS — E78.00 PURE HYPERCHOLESTEROLEMIA: ICD-10-CM

## 2024-09-12 DIAGNOSIS — E78.5 DYSLIPIDEMIA: ICD-10-CM

## 2024-09-12 DIAGNOSIS — R73.02 IMPAIRED GLUCOSE TOLERANCE: ICD-10-CM

## 2024-09-12 DIAGNOSIS — E78.5 HYPERLIPIDEMIA, UNSPECIFIED HYPERLIPIDEMIA TYPE: ICD-10-CM

## 2024-09-12 DIAGNOSIS — I25.10 CORONARY ARTERY DISEASE INVOLVING NATIVE CORONARY ARTERY OF NATIVE HEART WITHOUT ANGINA PECTORIS: ICD-10-CM

## 2024-09-12 DIAGNOSIS — I10 HYPERTENSION, ESSENTIAL: ICD-10-CM

## 2024-09-12 LAB
CHOLEST SERPL-MCNC: 153 MG/DL (ref 0–199)
CHOLESTEROL/HDL RATIO: 2
HDLC SERPL-MCNC: 66 MG/DL
LDLC SERPL CALC-MCNC: 74 MG/DL (ref 0–100)
TRIGL SERPL-MCNC: 64 MG/DL
VLDLC SERPL CALC-MCNC: 13 MG/DL

## 2024-09-12 PROCEDURE — 82172 ASSAY OF APOLIPOPROTEIN: CPT

## 2024-09-12 PROCEDURE — 36415 COLL VENOUS BLD VENIPUNCTURE: CPT

## 2024-09-12 PROCEDURE — 80061 LIPID PANEL: CPT

## 2024-09-14 LAB — LPA SERPL-MCNC: 21 MG/DL

## 2024-09-15 LAB — APO B100 SERPL-MCNC: 74 MG/DL (ref 60–117)

## 2024-09-18 ENCOUNTER — OFFICE VISIT (OUTPATIENT)
Dept: UROLOGY | Age: 69
End: 2024-09-18
Payer: MEDICARE

## 2024-09-18 VITALS
WEIGHT: 155 LBS | BODY MASS INDEX: 26.46 KG/M2 | OXYGEN SATURATION: 95 % | HEART RATE: 85 BPM | DIASTOLIC BLOOD PRESSURE: 72 MMHG | SYSTOLIC BLOOD PRESSURE: 120 MMHG | HEIGHT: 64 IN | RESPIRATION RATE: 16 BRPM

## 2024-09-18 DIAGNOSIS — N39.0 RECURRENT UTI: Primary | ICD-10-CM

## 2024-09-18 DIAGNOSIS — R35.1 NOCTURIA: ICD-10-CM

## 2024-09-18 PROCEDURE — 99213 OFFICE O/P EST LOW 20 MIN: CPT | Performed by: UROLOGY

## 2024-09-23 ENCOUNTER — TELEPHONE (OUTPATIENT)
Dept: INTERNAL MEDICINE | Age: 69
End: 2024-09-23

## 2024-09-23 ENCOUNTER — HOSPITAL ENCOUNTER (OUTPATIENT)
Age: 69
Discharge: HOME | End: 2024-09-23
Payer: MEDICARE

## 2024-09-23 DIAGNOSIS — R30.0 DYSURIA: ICD-10-CM

## 2024-09-23 DIAGNOSIS — R30.0 DYSURIA: Primary | ICD-10-CM

## 2024-09-23 LAB
BACTERIA URNS QL MICRO: ABNORMAL
BILIRUB UR QL STRIP: NEGATIVE
CHARACTER UR: ABNORMAL
CLARITY UR: CLEAR
COLOR UR: YELLOW
EPI CELLS #/AREA URNS HPF: ABNORMAL /HPF (ref 0–5)
GLUCOSE UR STRIP-MCNC: NEGATIVE MG/DL
HGB UR QL STRIP.AUTO: ABNORMAL
KETONES UR STRIP-MCNC: NEGATIVE MG/DL
LEUKOCYTE ESTERASE UR QL STRIP: ABNORMAL
NITRITE UR QL STRIP: NEGATIVE
PH UR STRIP: 6 [PH] (ref 5–6)
PROT UR STRIP-MCNC: NEGATIVE MG/DL
RBC #/AREA URNS HPF: ABNORMAL /HPF (ref 0–4)
SP GR UR STRIP: 1.01 (ref 1.01–1.02)
UROBILINOGEN UR STRIP-ACNC: NORMAL EU/DL (ref 0–1)
WBC #/AREA URNS HPF: ABNORMAL /HPF (ref 0–4)

## 2024-09-23 PROCEDURE — 81001 URINALYSIS AUTO W/SCOPE: CPT

## 2024-09-23 RX ORDER — CEPHALEXIN 500 MG/1
500 CAPSULE ORAL 2 TIMES DAILY
Qty: 14 CAPSULE | Refills: 0 | Status: SHIPPED | OUTPATIENT
Start: 2024-09-23 | End: 2024-09-30

## 2024-10-03 ENCOUNTER — TELEPHONE (OUTPATIENT)
Dept: INTERNAL MEDICINE | Age: 69
End: 2024-10-03

## 2024-10-03 RX ORDER — CEPHALEXIN 500 MG/1
500 CAPSULE ORAL 2 TIMES DAILY
Qty: 14 CAPSULE | Refills: 0 | Status: SHIPPED | OUTPATIENT
Start: 2024-10-03 | End: 2024-10-10

## 2024-10-03 NOTE — TELEPHONE ENCOUNTER
Patient called in stating that she thinks she has a UTI again already after finishing antibiotic. Patient reports fever, chills, bunging with urination and urgency. She would like to know if something can be sent into [facility]. Please advise.

## 2024-10-07 ENCOUNTER — IMMUNIZATION (OUTPATIENT)
Dept: LAB | Age: 69
End: 2024-10-07
Payer: MEDICARE

## 2024-10-07 PROCEDURE — 90471 IMMUNIZATION ADMIN: CPT | Performed by: NURSE PRACTITIONER

## 2024-10-07 PROCEDURE — PBSHW COVID-19, COMIRNATY, (AGE 12Y+), IM, PF, 30MCG/0.3ML: Performed by: NURSE PRACTITIONER

## 2024-10-07 PROCEDURE — PBSHW INFLUENZA, FLUAD TRIVALENT, (AGE 65 Y+), IM, PRESERVATIVE FREE, 0.5ML: Performed by: NURSE PRACTITIONER

## 2024-10-07 PROCEDURE — 90653 IIV ADJUVANT VACCINE IM: CPT | Performed by: NURSE PRACTITIONER

## 2024-10-08 ENCOUNTER — HOSPITAL ENCOUNTER (OUTPATIENT)
Dept: INTERVENTIONAL RADIOLOGY/VASCULAR | Age: 69
Discharge: HOME | End: 2024-10-10
Attending: UROLOGY
Payer: MEDICARE

## 2024-10-08 DIAGNOSIS — N39.0 RECURRENT UTI: ICD-10-CM

## 2024-10-08 PROCEDURE — 76770 US EXAM ABDO BACK WALL COMP: CPT

## 2024-11-06 ENCOUNTER — OFFICE VISIT (OUTPATIENT)
Dept: UROLOGY | Age: 69
End: 2024-11-06
Payer: MEDICARE

## 2024-11-06 VITALS
BODY MASS INDEX: 26.46 KG/M2 | HEART RATE: 74 BPM | WEIGHT: 155 LBS | SYSTOLIC BLOOD PRESSURE: 122 MMHG | DIASTOLIC BLOOD PRESSURE: 76 MMHG | HEIGHT: 64 IN

## 2024-11-06 DIAGNOSIS — R35.1 NOCTURIA: ICD-10-CM

## 2024-11-06 DIAGNOSIS — N32.81 OVERACTIVE BLADDER: ICD-10-CM

## 2024-11-06 DIAGNOSIS — R35.0 URINARY FREQUENCY: ICD-10-CM

## 2024-11-06 DIAGNOSIS — N39.0 RECURRENT UTI: Primary | ICD-10-CM

## 2024-11-06 PROCEDURE — 99213 OFFICE O/P EST LOW 20 MIN: CPT | Performed by: UROLOGY

## 2024-11-06 RX ORDER — OXYBUTYNIN CHLORIDE 5 MG/1
5 TABLET, EXTENDED RELEASE ORAL DAILY
Qty: 90 TABLET | Refills: 3 | Status: SHIPPED | OUTPATIENT
Start: 2024-11-06 | End: 2024-11-06 | Stop reason: SDUPTHER

## 2024-11-06 RX ORDER — OXYBUTYNIN CHLORIDE 5 MG/1
5 TABLET, EXTENDED RELEASE ORAL DAILY
Qty: 90 TABLET | Refills: 3 | Status: SHIPPED | OUTPATIENT
Start: 2024-11-06

## 2024-11-06 NOTE — PROGRESS NOTES
3. Nocturia    4. Overactive bladder           Recurrent UTIs/persistent:     Probiotics  D-mannose  Cranberry pills  Increase fluids      Patient instructed to limit fluid intake 2-3 hours prior to bedtime to minimize nocturia or nocturnal incontinence.    Feels like has infection 9/2024  UA reviewed. Ordered by Venus; not obvious infection  Reviewed renal and bladder US- Unremarkable ultrasound of the kidneys and urinary bladder     OAB, new: Trial of 5 mg Ditropan    Give list of bladder irritants  Fu 3-4 months VV        Plan:   Assessment & Plan   No follow-ups on file.         Prescriptions Ordered:  Orders Placed This Encounter   Medications    oxyBUTYnin (DITROPAN-XL) 5 MG extended release tablet     Sig: Take 1 tablet by mouth daily     Dispense:  90 tablet     Refill:  3      Orders Placed:  No orders of the defined types were placed in this encounter.           MD Silvano RAGLAND MD  [facility]

## 2025-01-06 ENCOUNTER — OFFICE VISIT (OUTPATIENT)
Dept: INTERNAL MEDICINE | Age: 70
End: 2025-01-06
Payer: MEDICARE

## 2025-01-06 ENCOUNTER — HOSPITAL ENCOUNTER (OUTPATIENT)
Age: 70
Discharge: HOME | End: 2025-01-06
Payer: MEDICARE

## 2025-01-06 VITALS
WEIGHT: 161 LBS | BODY MASS INDEX: 27.49 KG/M2 | OXYGEN SATURATION: 95 % | SYSTOLIC BLOOD PRESSURE: 110 MMHG | HEIGHT: 64 IN | HEART RATE: 71 BPM | RESPIRATION RATE: 16 BRPM | DIASTOLIC BLOOD PRESSURE: 60 MMHG

## 2025-01-06 DIAGNOSIS — E03.9 HYPOTHYROIDISM, UNSPECIFIED TYPE: ICD-10-CM

## 2025-01-06 DIAGNOSIS — E78.5 DYSLIPIDEMIA: Primary | ICD-10-CM

## 2025-01-06 DIAGNOSIS — E55.9 VITAMIN D DEFICIENCY: ICD-10-CM

## 2025-01-06 DIAGNOSIS — E78.5 DYSLIPIDEMIA: ICD-10-CM

## 2025-01-06 DIAGNOSIS — R73.02 IMPAIRED GLUCOSE TOLERANCE: ICD-10-CM

## 2025-01-06 LAB
25(OH)D3 SERPL-MCNC: 45.3 NG/ML (ref 30–100)
ALBUMIN SERPL-MCNC: 3.8 G/DL (ref 3.5–5.2)
ALBUMIN/GLOB SERPL: 1.3 {RATIO} (ref 1–2.5)
ALP SERPL-CCNC: 121 U/L (ref 35–104)
ALT SERPL-CCNC: 15 U/L (ref 5–33)
ANION GAP SERPL CALCULATED.3IONS-SCNC: 11 MMOL/L (ref 9–17)
AST SERPL-CCNC: 19 U/L
BASOPHILS # BLD: 0.04 K/UL (ref 0–0.2)
BASOPHILS NFR BLD: 1 % (ref 0–2)
BILIRUB SERPL-MCNC: 0.4 MG/DL (ref 0.3–1.2)
BUN SERPL-MCNC: 18 MG/DL (ref 8–23)
BUN/CREAT SERPL: 30 (ref 9–20)
CALCIUM SERPL-MCNC: 9.3 MG/DL (ref 8.6–10.4)
CHLORIDE SERPL-SCNC: 104 MMOL/L (ref 98–107)
CHOLEST SERPL-MCNC: 150 MG/DL (ref 0–199)
CHOLESTEROL/HDL RATIO: 2.6
CO2 SERPL-SCNC: 26 MMOL/L (ref 20–31)
CREAT SERPL-MCNC: 0.6 MG/DL (ref 0.5–0.9)
EOSINOPHIL # BLD: 0.31 K/UL (ref 0–0.44)
EOSINOPHILS RELATIVE PERCENT: 5 % (ref 1–4)
ERYTHROCYTE [DISTWIDTH] IN BLOOD BY AUTOMATED COUNT: 13.2 % (ref 11.8–14.4)
GFR, ESTIMATED: >90 ML/MIN/1.73M2
GLUCOSE SERPL-MCNC: 89 MG/DL (ref 70–99)
HCT VFR BLD AUTO: 41 % (ref 36.3–47.1)
HDLC SERPL-MCNC: 57 MG/DL
HGB BLD-MCNC: 14 G/DL (ref 11.9–15.1)
IMM GRANULOCYTES # BLD AUTO: 0.03 K/UL (ref 0–0.3)
IMM GRANULOCYTES NFR BLD: 0 %
LDLC SERPL CALC-MCNC: 73 MG/DL (ref 0–100)
LYMPHOCYTES NFR BLD: 1.6 K/UL (ref 1.1–3.7)
LYMPHOCYTES RELATIVE PERCENT: 24 % (ref 24–43)
MCH RBC QN AUTO: 30 PG (ref 25.2–33.5)
MCHC RBC AUTO-ENTMCNC: 34.1 G/DL (ref 25.2–33.5)
MCV RBC AUTO: 88 FL (ref 82.6–102.9)
MONOCYTES NFR BLD: 0.51 K/UL (ref 0.1–1.2)
MONOCYTES NFR BLD: 8 % (ref 3–12)
NEUTROPHILS NFR BLD: 62 % (ref 36–65)
NEUTS SEG NFR BLD: 4.32 K/UL (ref 1.5–8.1)
NRBC BLD-RTO: 0 PER 100 WBC
PLATELET # BLD AUTO: 223 K/UL (ref 138–453)
PMV BLD AUTO: 9 FL (ref 8.1–13.5)
POTASSIUM SERPL-SCNC: 3.9 MMOL/L (ref 3.7–5.3)
PROT SERPL-MCNC: 6.8 G/DL (ref 6.4–8.3)
RBC # BLD AUTO: 4.66 M/UL (ref 3.95–5.11)
SODIUM SERPL-SCNC: 141 MMOL/L (ref 135–144)
TRIGL SERPL-MCNC: 100 MG/DL
TSH SERPL DL<=0.05 MIU/L-ACNC: 3.86 UIU/ML (ref 0.3–5)
VLDLC SERPL CALC-MCNC: 20 MG/DL (ref 1–30)
WBC OTHER # BLD: 6.8 K/UL (ref 3.5–11.3)

## 2025-01-06 PROCEDURE — 85025 COMPLETE CBC W/AUTO DIFF WBC: CPT

## 2025-01-06 PROCEDURE — 83036 HEMOGLOBIN GLYCOSYLATED A1C: CPT

## 2025-01-06 PROCEDURE — 80053 COMPREHEN METABOLIC PANEL: CPT

## 2025-01-06 PROCEDURE — 99202 OFFICE O/P NEW SF 15 MIN: CPT | Performed by: INTERNAL MEDICINE

## 2025-01-06 PROCEDURE — 84443 ASSAY THYROID STIM HORMONE: CPT

## 2025-01-06 PROCEDURE — 36415 COLL VENOUS BLD VENIPUNCTURE: CPT

## 2025-01-06 PROCEDURE — 80061 LIPID PANEL: CPT

## 2025-01-06 PROCEDURE — 82306 VITAMIN D 25 HYDROXY: CPT

## 2025-01-06 SDOH — ECONOMIC STABILITY: INCOME INSECURITY: HOW HARD IS IT FOR YOU TO PAY FOR THE VERY BASICS LIKE FOOD, HOUSING, MEDICAL CARE, AND HEATING?: NOT VERY HARD

## 2025-01-06 SDOH — ECONOMIC STABILITY: FOOD INSECURITY: WITHIN THE PAST 12 MONTHS, THE FOOD YOU BOUGHT JUST DIDN'T LAST AND YOU DIDN'T HAVE MONEY TO GET MORE.: NEVER TRUE

## 2025-01-06 SDOH — ECONOMIC STABILITY: FOOD INSECURITY: WITHIN THE PAST 12 MONTHS, YOU WORRIED THAT YOUR FOOD WOULD RUN OUT BEFORE YOU GOT MONEY TO BUY MORE.: NEVER TRUE

## 2025-01-06 ASSESSMENT — PATIENT HEALTH QUESTIONNAIRE - PHQ9
1. LITTLE INTEREST OR PLEASURE IN DOING THINGS: NOT AT ALL
SUM OF ALL RESPONSES TO PHQ QUESTIONS 1-9: 0
2. FEELING DOWN, DEPRESSED OR HOPELESS: NOT AT ALL
DEPRESSION UNABLE TO ASSESS: FUNCTIONAL CAPACITY MOTIVATION LIMITS ACCURACY
SUM OF ALL RESPONSES TO PHQ9 QUESTIONS 1 & 2: 0
SUM OF ALL RESPONSES TO PHQ QUESTIONS 1-9: 0

## 2025-01-06 NOTE — PROGRESS NOTES
voiced understanding.  Reviewed health maintenance.      Electronically signed byLE CHEUNG MD on 1/6/2025 at 5:42 PM    This note has been created using the Epic electronic health record, and dictated in part by Dragon Medical One dictation system. Despite the documenting physician's best efforts, there may be errors in spelling, grammar or syntax.

## 2025-01-07 LAB
EST. AVERAGE GLUCOSE BLD GHB EST-MCNC: 111 MG/DL
HBA1C MFR BLD: 5.5 % (ref 4–6)

## 2025-01-26 ENCOUNTER — OFFICE VISIT (OUTPATIENT)
Dept: PRIMARY CARE CLINIC | Age: 70
End: 2025-01-26

## 2025-01-26 ENCOUNTER — HOSPITAL ENCOUNTER (OUTPATIENT)
Age: 70
Discharge: HOME | End: 2025-01-26
Payer: MEDICARE

## 2025-01-26 VITALS
OXYGEN SATURATION: 97 % | BODY MASS INDEX: 27.31 KG/M2 | HEART RATE: 72 BPM | WEIGHT: 160 LBS | DIASTOLIC BLOOD PRESSURE: 74 MMHG | RESPIRATION RATE: 20 BRPM | HEIGHT: 64 IN | TEMPERATURE: 99.2 F | SYSTOLIC BLOOD PRESSURE: 126 MMHG

## 2025-01-26 DIAGNOSIS — M54.31 RIGHT SCIATIC NERVE PAIN: ICD-10-CM

## 2025-01-26 DIAGNOSIS — R82.71 BACTERIA IN URINE: Primary | ICD-10-CM

## 2025-01-26 DIAGNOSIS — R10.9 FLANK PAIN: ICD-10-CM

## 2025-01-26 DIAGNOSIS — R82.71 BACTERIA IN URINE: ICD-10-CM

## 2025-01-26 LAB
BACTERIA URNS QL MICRO: ABNORMAL
BILIRUB UR QL STRIP: NEGATIVE
CHARACTER UR: ABNORMAL
CLARITY UR: CLEAR
COLOR UR: YELLOW
EPI CELLS #/AREA URNS HPF: ABNORMAL /HPF (ref 0–5)
GLUCOSE UR STRIP-MCNC: NEGATIVE MG/DL
HGB UR QL STRIP.AUTO: ABNORMAL
KETONES UR STRIP-MCNC: NEGATIVE MG/DL
LEUKOCYTE ESTERASE UR QL STRIP: ABNORMAL
NITRITE UR QL STRIP: NEGATIVE
PH UR STRIP: 6 [PH] (ref 5–6)
PROT UR STRIP-MCNC: NEGATIVE MG/DL
RBC #/AREA URNS HPF: ABNORMAL /HPF (ref 0–4)
SP GR UR STRIP: 1 (ref 1.01–1.02)
UROBILINOGEN UR STRIP-ACNC: NORMAL EU/DL (ref 0–1)
WBC #/AREA URNS HPF: ABNORMAL /HPF (ref 0–4)

## 2025-01-26 RX ORDER — CEPHALEXIN 500 MG/1
500 CAPSULE ORAL 3 TIMES DAILY
Qty: 21 CAPSULE | Refills: 0 | Status: SHIPPED | OUTPATIENT
Start: 2025-01-26 | End: 2025-02-02

## 2025-01-26 RX ORDER — TRAMADOL HYDROCHLORIDE 50 MG/1
50 TABLET ORAL EVERY 6 HOURS PRN
Qty: 12 TABLET | Refills: 0 | Status: SHIPPED | OUTPATIENT
Start: 2025-01-26 | End: 2025-01-29

## 2025-01-26 SDOH — ECONOMIC STABILITY: FOOD INSECURITY: WITHIN THE PAST 12 MONTHS, THE FOOD YOU BOUGHT JUST DIDN'T LAST AND YOU DIDN'T HAVE MONEY TO GET MORE.: NEVER TRUE

## 2025-01-26 SDOH — ECONOMIC STABILITY: FOOD INSECURITY: WITHIN THE PAST 12 MONTHS, YOU WORRIED THAT YOUR FOOD WOULD RUN OUT BEFORE YOU GOT MONEY TO BUY MORE.: NEVER TRUE

## 2025-01-28 LAB
MICROORGANISM SPEC CULT: NORMAL
SERVICE CMNT-IMP: NORMAL
SPECIMEN DESCRIPTION: NORMAL

## 2025-03-05 ENCOUNTER — OFFICE VISIT (OUTPATIENT)
Dept: CARDIOLOGY | Age: 70
End: 2025-03-05
Payer: MEDICARE

## 2025-03-05 VITALS
SYSTOLIC BLOOD PRESSURE: 122 MMHG | DIASTOLIC BLOOD PRESSURE: 72 MMHG | HEART RATE: 85 BPM | WEIGHT: 157.6 LBS | HEIGHT: 64 IN | BODY MASS INDEX: 26.91 KG/M2

## 2025-03-05 DIAGNOSIS — E78.5 HYPERLIPIDEMIA, UNSPECIFIED HYPERLIPIDEMIA TYPE: ICD-10-CM

## 2025-03-05 DIAGNOSIS — E78.00 PURE HYPERCHOLESTEROLEMIA: ICD-10-CM

## 2025-03-05 DIAGNOSIS — I25.10 CORONARY ARTERY DISEASE INVOLVING NATIVE CORONARY ARTERY OF NATIVE HEART WITHOUT ANGINA PECTORIS: Primary | ICD-10-CM

## 2025-03-05 PROCEDURE — 1123F ACP DISCUSS/DSCN MKR DOCD: CPT | Performed by: INTERNAL MEDICINE

## 2025-03-05 PROCEDURE — 93010 ELECTROCARDIOGRAM REPORT: CPT | Performed by: INTERNAL MEDICINE

## 2025-03-05 PROCEDURE — 1159F MED LIST DOCD IN RCRD: CPT | Performed by: INTERNAL MEDICINE

## 2025-03-05 PROCEDURE — 99212 OFFICE O/P EST SF 10 MIN: CPT | Performed by: INTERNAL MEDICINE

## 2025-03-05 PROCEDURE — 99214 OFFICE O/P EST MOD 30 MIN: CPT | Performed by: INTERNAL MEDICINE

## 2025-03-05 PROCEDURE — 93005 ELECTROCARDIOGRAM TRACING: CPT | Performed by: INTERNAL MEDICINE

## 2025-03-05 NOTE — PROGRESS NOTES
total) bid   Yes Libby Lr MD   NONFORMULARY BosMed 500 mg 1 po daily   Yes Libby Lr MD   MILK THISTLE PO Take 300 mg by mouth in the morning and at bedtime   Yes Libby Lr MD   NONFORMULARY Focus Factor, 4 tablets daily multivitamin   Yes Libby Lr MD   NONFORMULARY Iodine 2%- 12 drops by mouth every night   Yes Libby Lr MD   NONFORMULARY Welsh Grape Seed Extract 400 mg daily   Yes Libby Lr MD   Barberry-Oreg Grape-Goldenseal (BERBERINE COMPLEX PO) Take 250 mg by mouth daily   Yes Libby Lr MD   Multiple Vitamins-Minerals (PRESERVISION AREDS 2) CAPS Take 1 capsule by mouth 2 times daily   Yes Libby Lr MD   QUERCETIN PO Take 2 capsules by mouth 2 times daily (800 mg each), with Bromeline 165 mg each   Yes Libby Lr MD   vitamin D3 (CHOLECALCIFEROL) 25 MCG (1000 UT) TABS tablet Take 1 tablet by mouth daily 9/10/20  Yes Lauren Garzon APRN - CNP   Coenzyme Q10 (CO Q-10) 300 MG CAPS Take 1 capsule by mouth daily   Yes Libby Lr MD   aspirin 81 MG tablet Take 1 tablet by mouth daily   Yes Libby Lr MD   GARLIC Take 320 mg by mouth daily.   Yes Libby Lr MD   Ascorbic Acid (VITAMIN C) 1000 MG tablet Take 1 tablet by mouth daily   Yes Libby Lr MD   Methylsulfonylmethane (MSM PO) Take 3,000 mg by mouth daily   Yes Libby Lr MD       Allergies:  Ciprofloxacin and Sulfa antibiotics    Social History:   reports that she has never smoked. She has never used smokeless tobacco. She reports current alcohol use. She reports that she does not use drugs.     Family History: family history includes Alzheimer's Disease in her mother; Cancer in her brother; Coronary Art Dis in her father; Diabetes in her mother; Heart Attack in her father; High Cholesterol in her brother and father; Hypertension in her father; Other in her child; Stroke in her father. No h/o

## 2025-03-14 DIAGNOSIS — E78.5 DYSLIPIDEMIA: ICD-10-CM

## 2025-03-14 RX ORDER — ROSUVASTATIN CALCIUM 5 MG/1
5 TABLET, COATED ORAL NIGHTLY
Qty: 90 TABLET | Refills: 0 | Status: SHIPPED | OUTPATIENT
Start: 2025-03-14

## 2025-03-14 NOTE — TELEPHONE ENCOUNTER
Qian called requesting a refill of the below medication which has been pended for you:     Requested Prescriptions     Pending Prescriptions Disp Refills    rosuvastatin (CRESTOR) 5 MG tablet [Pharmacy Med Name: ROSUVASTATIN TAB 5MG] 90 tablet 3     Sig: TAKE 1 TABLET NIGHTLY       Last Appointment Date: 7/18/2024  Next Appointment Date: Visit date not found    Allergies   Allergen Reactions    Ciprofloxacin Nausea Only    Sulfa Antibiotics Hives and Rash

## 2025-03-19 ENCOUNTER — OFFICE VISIT (OUTPATIENT)
Dept: UROLOGY | Age: 70
End: 2025-03-19
Payer: MEDICARE

## 2025-03-19 VITALS — DIASTOLIC BLOOD PRESSURE: 78 MMHG | SYSTOLIC BLOOD PRESSURE: 110 MMHG | HEART RATE: 81 BPM | OXYGEN SATURATION: 98 %

## 2025-03-19 DIAGNOSIS — R35.1 NOCTURIA: ICD-10-CM

## 2025-03-19 DIAGNOSIS — N32.81 OVERACTIVE BLADDER: ICD-10-CM

## 2025-03-19 DIAGNOSIS — N39.0 RECURRENT UTI: Primary | ICD-10-CM

## 2025-03-19 DIAGNOSIS — R35.0 URINARY FREQUENCY: ICD-10-CM

## 2025-03-19 PROCEDURE — 1123F ACP DISCUSS/DSCN MKR DOCD: CPT | Performed by: UROLOGY

## 2025-03-19 PROCEDURE — 1160F RVW MEDS BY RX/DR IN RCRD: CPT | Performed by: UROLOGY

## 2025-03-19 PROCEDURE — 1159F MED LIST DOCD IN RCRD: CPT | Performed by: UROLOGY

## 2025-03-19 PROCEDURE — 99213 OFFICE O/P EST LOW 20 MIN: CPT | Performed by: UROLOGY

## 2025-03-19 PROCEDURE — 99214 OFFICE O/P EST MOD 30 MIN: CPT | Performed by: UROLOGY

## 2025-03-19 RX ORDER — MIRABEGRON 50 MG/1
50 TABLET, FILM COATED, EXTENDED RELEASE ORAL DAILY
Qty: 90 TABLET | Refills: 3 | Status: SHIPPED | OUTPATIENT
Start: 2025-03-19 | End: 2025-06-17

## 2025-03-19 NOTE — PROGRESS NOTES
Silvano Fermin MD  Urology Clinic office visit      Patient:  Qian Calvillo  YOB: 1955  Date: 3/19/2025    HISTORY OF PRESENT ILLNESS:   The patient is a 70 y.o. female who presents today for evaluation of the following problems:      1. Recurrent UTI    2. Urinary frequency    3. Nocturia    4. Overactive bladder           Overall the problem(s) : are worsening.  Associated Symptoms: No dysuria, gross hematuria.  Pain Severity:      Summary of old records: N/A  (Patient's old records, notes and chart reviewed and summarized above.)      Onset 7/2024  Severity is described as had complicated UTI.   The course of symptoms over time is insidious and worsening.  Alleviating factors: treated with keflex usually, but recently needed repeated course x 2  Worsening factors: none      Urinalysis today:  No results found for this visit on 03/19/25.    Last BUN and creatinine:  Lab Results   Component Value Date    BUN 18 01/06/2025     Lab Results   Component Value Date    CREATININE 0.6 01/06/2025       Imaging Reviewed during this Office Visit:   (results were independently reviewed by physician and radiology report verified)  I independently reviewed and verified the images and reports from:    No results found.      PAST MEDICAL, FAMILY AND SOCIAL HISTORY:  Past Medical History:   Diagnosis Date    Appendicitis 10/20/2022    Hypercholesterolemia     Hypothyroidism     Subacute, not currently on treeatment.    Impaired glucose tolerance     Prediabetes/stable and checking A1C yearly    Macular degeneration     Menopausal syndrome     Mitral regurgitation     Echo 1/19 mild MR    Osteopenia     Dexa 8/18 FRAX 1% at hip, T -1.5 Hip    Vitamin D deficiency      Past Surgical History:   Procedure Laterality Date    BLADDER SUSPENSION      In the past for a cystocele.    BREAST BIOPSY Right     Negative.    COLONOSCOPY N/A 3/12/2019    COLONOSCOPY performed by Sam Colon DO at [facility] OR    COLONOSCOPY

## 2025-03-19 NOTE — PATIENT INSTRUCTIONS
Bladder irritants    For some people, certain foods may irritate the bladder, causing or making bladder symptoms worse. If symptoms are related to diet, avoiding highly acidic or spicy foods, alcohol, or carbonated drinks should bring relief after approximately 10 days. Once the symptoms have improved on a restricted diet, patients can gradually add these foods back into the diet. If one specific food does cause symptoms, it can be easily identified and avoided.     Foods that should be avoided:       Apples     Plums  Apple juice    Strawberries  [place]    Tea  Carbonated beverages   Tomatoes  Chilies/spicy foods   Vinegar  Chocolate    Vitamin B complex  Citrus fruits    Bananas  Coffee (including decaffeinated)  Saccharin sweetners (Sweet'n low)  Cranberries    Soy sauce  Grapes     Alcohol  Guava     Processed meat and fish  Nutrasweet    Tofu  Mayonnaise    Yogurt  Peaches    Pineapple

## 2025-04-08 DIAGNOSIS — R73.02 IMPAIRED GLUCOSE TOLERANCE: ICD-10-CM

## 2025-04-08 DIAGNOSIS — G51.0 BELL'S PALSY: ICD-10-CM

## 2025-04-08 DIAGNOSIS — E78.5 DYSLIPIDEMIA: ICD-10-CM

## 2025-04-08 DIAGNOSIS — I25.10 CORONARY ARTERY DISEASE INVOLVING NATIVE CORONARY ARTERY OF NATIVE HEART WITHOUT ANGINA PECTORIS: ICD-10-CM

## 2025-04-08 DIAGNOSIS — I10 HYPERTENSION, ESSENTIAL: ICD-10-CM

## 2025-04-08 DIAGNOSIS — E78.00 PURE HYPERCHOLESTEROLEMIA: ICD-10-CM

## 2025-04-08 DIAGNOSIS — E78.5 HYPERLIPIDEMIA, UNSPECIFIED HYPERLIPIDEMIA TYPE: ICD-10-CM

## 2025-04-08 RX ORDER — EZETIMIBE 10 MG/1
10 TABLET ORAL DAILY
Qty: 90 TABLET | Refills: 3 | Status: SHIPPED | OUTPATIENT
Start: 2025-04-08

## 2025-05-13 ENCOUNTER — TELEPHONE (OUTPATIENT)
Dept: INTERNAL MEDICINE | Age: 70
End: 2025-05-13

## 2025-05-13 ENCOUNTER — HOSPITAL ENCOUNTER (OUTPATIENT)
Age: 70
Discharge: HOME | End: 2025-05-13
Payer: MEDICARE

## 2025-05-13 DIAGNOSIS — R35.0 URINARY FREQUENCY: ICD-10-CM

## 2025-05-13 DIAGNOSIS — R35.0 URINARY FREQUENCY: Primary | ICD-10-CM

## 2025-05-13 DIAGNOSIS — R30.0 BURNING WITH URINATION: ICD-10-CM

## 2025-05-13 LAB

## 2025-05-13 PROCEDURE — 81001 URINALYSIS AUTO W/SCOPE: CPT

## 2025-05-13 RX ORDER — NITROFURANTOIN 25; 75 MG/1; MG/1
100 CAPSULE ORAL 2 TIMES DAILY
Qty: 14 CAPSULE | Refills: 0 | Status: SHIPPED | OUTPATIENT
Start: 2025-05-13 | End: 2025-05-20

## 2025-05-13 NOTE — TELEPHONE ENCOUNTER
Patient calling in stating that she has a uti and c/o burning with urination and frequency. She is agreeable to UA and will have done today. She would like to know if something can be sent into [facility].    Please advise

## 2025-05-13 NOTE — TELEPHONE ENCOUNTER
Urine shows blood but not necessarily infection, if she is willing to wait for couple of days and let us know how she feels.  If she is pretty sure that she has a UTI, and her symptoms are the same as always, then antibiotics would not be a bad idea.  I would recommend waiting for couple of days.

## 2025-05-21 ENCOUNTER — TELEPHONE (OUTPATIENT)
Dept: INTERNAL MEDICINE | Age: 70
End: 2025-05-21

## 2025-05-21 NOTE — TELEPHONE ENCOUNTER
Patient called in because she is completed with the antibiotic that Dr. Kirby sent in. She feels like the UTI needs additional treatment. She states the symptoms started again last night and she knows what a UTI is.     The symptoms include burning, inflammation and increased urination.   She would like a new antibiotic sent to [facility] pharmacy in [place]. She is afraid of trying to travel home and her symptoms could get worse.

## 2025-05-21 NOTE — TELEPHONE ENCOUNTER
Patient mentioned that Dr. Kirby told her that he would extend her abx if she continued to have symptoms.

## 2025-05-22 RX ORDER — CEPHALEXIN 500 MG/1
500 CAPSULE ORAL 3 TIMES DAILY
Qty: 21 CAPSULE | Refills: 0 | Status: SHIPPED | OUTPATIENT
Start: 2025-05-22 | End: 2025-05-29

## 2025-05-22 NOTE — TELEPHONE ENCOUNTER
I sent Keflex to the pharmacy, but if the symptoms happen again, then we need to discuss, because there is a chance that her bladder might be irritated but not a UTI.

## 2025-05-22 NOTE — TELEPHONE ENCOUNTER
Pt notified per phone call. Informed that she will need to be seen if sx reoccur. Pt voices understanding.

## 2025-06-12 DIAGNOSIS — E78.5 DYSLIPIDEMIA: ICD-10-CM

## 2025-06-12 RX ORDER — ROSUVASTATIN CALCIUM 5 MG/1
5 TABLET, COATED ORAL NIGHTLY
Qty: 90 TABLET | Refills: 0 | Status: SHIPPED | OUTPATIENT
Start: 2025-06-12

## 2025-07-11 ENCOUNTER — HOSPITAL ENCOUNTER (OUTPATIENT)
Age: 70
Discharge: HOME | End: 2025-07-11
Payer: MEDICARE

## 2025-07-11 DIAGNOSIS — R73.02 IMPAIRED GLUCOSE TOLERANCE: ICD-10-CM

## 2025-07-11 DIAGNOSIS — E78.5 DYSLIPIDEMIA: ICD-10-CM

## 2025-07-11 DIAGNOSIS — E03.9 HYPOTHYROIDISM, UNSPECIFIED TYPE: ICD-10-CM

## 2025-07-11 DIAGNOSIS — E55.9 VITAMIN D DEFICIENCY: ICD-10-CM

## 2025-07-11 LAB
25(OH)D3 SERPL-MCNC: 42.1 NG/ML (ref 30–100)
ALBUMIN SERPL-MCNC: 3.9 G/DL (ref 3.5–5.2)
ALBUMIN/GLOB SERPL: 1.5 {RATIO} (ref 1–2.5)
ALP SERPL-CCNC: 106 U/L (ref 35–104)
ALT SERPL-CCNC: 20 U/L (ref 10–35)
ANION GAP SERPL CALCULATED.3IONS-SCNC: 11 MMOL/L (ref 9–16)
AST SERPL-CCNC: 22 U/L (ref 10–35)
BASOPHILS # BLD: 0.04 K/UL (ref 0–0.2)
BASOPHILS NFR BLD: 1 % (ref 0–2)
BILIRUB SERPL-MCNC: 0.7 MG/DL (ref 0–1.2)
BUN SERPL-MCNC: 18 MG/DL (ref 8–23)
BUN/CREAT SERPL: 23 (ref 9–20)
CALCIUM SERPL-MCNC: 9.5 MG/DL (ref 8.6–10.4)
CHLORIDE SERPL-SCNC: 107 MMOL/L (ref 98–107)
CHOLEST SERPL-MCNC: 150 MG/DL (ref 0–199)
CHOLESTEROL/HDL RATIO: 2.2
CO2 SERPL-SCNC: 27 MMOL/L (ref 20–31)
CREAT SERPL-MCNC: 0.8 MG/DL (ref 0.6–0.9)
EOSINOPHIL # BLD: 0.18 K/UL (ref 0–0.44)
EOSINOPHILS RELATIVE PERCENT: 4 % (ref 1–4)
ERYTHROCYTE [DISTWIDTH] IN BLOOD BY AUTOMATED COUNT: 13 % (ref 11.8–14.4)
EST. AVERAGE GLUCOSE BLD GHB EST-MCNC: 114 MG/DL
GFR, ESTIMATED: 78 ML/MIN/1.73M2
GLUCOSE SERPL-MCNC: 94 MG/DL (ref 74–99)
HBA1C MFR BLD: 5.6 % (ref 4–6)
HCT VFR BLD AUTO: 43.4 % (ref 36.3–47.1)
HDLC SERPL-MCNC: 67 MG/DL
HGB BLD-MCNC: 14.5 G/DL (ref 11.9–15.1)
IMM GRANULOCYTES # BLD AUTO: <0.03 K/UL (ref 0–0.3)
IMM GRANULOCYTES NFR BLD: 0 %
LDLC SERPL CALC-MCNC: 69 MG/DL (ref 0–100)
LYMPHOCYTES NFR BLD: 1.19 K/UL (ref 1.1–3.7)
LYMPHOCYTES RELATIVE PERCENT: 23 % (ref 24–43)
MCH RBC QN AUTO: 30.3 PG (ref 25.2–33.5)
MCHC RBC AUTO-ENTMCNC: 33.4 G/DL (ref 25.2–33.5)
MCV RBC AUTO: 90.6 FL (ref 82.6–102.9)
MONOCYTES NFR BLD: 0.33 K/UL (ref 0.1–1.2)
MONOCYTES NFR BLD: 7 % (ref 3–12)
NEUTROPHILS NFR BLD: 65 % (ref 36–65)
NEUTS SEG NFR BLD: 3.34 K/UL (ref 1.5–8.1)
NRBC BLD-RTO: 0 PER 100 WBC
PLATELET # BLD AUTO: 167 K/UL (ref 138–453)
PMV BLD AUTO: 10.3 FL (ref 8.1–13.5)
POTASSIUM SERPL-SCNC: 4.4 MMOL/L (ref 3.7–5.3)
PROT SERPL-MCNC: 6.5 G/DL (ref 6.6–8.7)
RBC # BLD AUTO: 4.79 M/UL (ref 3.95–5.11)
SODIUM SERPL-SCNC: 145 MMOL/L (ref 136–145)
TRIGL SERPL-MCNC: 70 MG/DL
TSH SERPL DL<=0.05 MIU/L-ACNC: 4.05 UIU/ML (ref 0.27–4.2)
VLDLC SERPL CALC-MCNC: 14 MG/DL (ref 1–30)
WBC OTHER # BLD: 5.1 K/UL (ref 3.5–11.3)

## 2025-07-11 PROCEDURE — 83036 HEMOGLOBIN GLYCOSYLATED A1C: CPT

## 2025-07-11 PROCEDURE — 80053 COMPREHEN METABOLIC PANEL: CPT

## 2025-07-11 PROCEDURE — 80061 LIPID PANEL: CPT

## 2025-07-11 PROCEDURE — 84443 ASSAY THYROID STIM HORMONE: CPT

## 2025-07-11 PROCEDURE — 85025 COMPLETE CBC W/AUTO DIFF WBC: CPT

## 2025-07-11 PROCEDURE — 82306 VITAMIN D 25 HYDROXY: CPT

## 2025-07-11 PROCEDURE — 36415 COLL VENOUS BLD VENIPUNCTURE: CPT

## 2025-07-11 SDOH — HEALTH STABILITY: PHYSICAL HEALTH: ON AVERAGE, HOW MANY MINUTES DO YOU ENGAGE IN EXERCISE AT THIS LEVEL?: 60 MIN

## 2025-07-11 SDOH — HEALTH STABILITY: PHYSICAL HEALTH: ON AVERAGE, HOW MANY DAYS PER WEEK DO YOU ENGAGE IN MODERATE TO STRENUOUS EXERCISE (LIKE A BRISK WALK)?: 7 DAYS

## 2025-07-11 ASSESSMENT — PATIENT HEALTH QUESTIONNAIRE - PHQ9
SUM OF ALL RESPONSES TO PHQ QUESTIONS 1-9: 0
SUM OF ALL RESPONSES TO PHQ QUESTIONS 1-9: 0
2. FEELING DOWN, DEPRESSED OR HOPELESS: NOT AT ALL
1. LITTLE INTEREST OR PLEASURE IN DOING THINGS: NOT AT ALL
SUM OF ALL RESPONSES TO PHQ QUESTIONS 1-9: 0
SUM OF ALL RESPONSES TO PHQ QUESTIONS 1-9: 0

## 2025-07-11 ASSESSMENT — LIFESTYLE VARIABLES
HAVE YOU OR SOMEONE ELSE BEEN INJURED AS A RESULT OF YOUR DRINKING: NO
HAS A RELATIVE, FRIEND, DOCTOR, OR ANOTHER HEALTH PROFESSIONAL EXPRESSED CONCERN ABOUT YOUR DRINKING OR SUGGESTED YOU CUT DOWN: NO
HOW OFTEN DURING THE LAST YEAR HAVE YOU FAILED TO DO WHAT WAS NORMALLY EXPECTED FROM YOU BECAUSE OF DRINKING: NEVER
HOW OFTEN DO YOU HAVE SIX OR MORE DRINKS ON ONE OCCASION: 1
HOW OFTEN DURING THE LAST YEAR HAVE YOU NEEDED AN ALCOHOLIC DRINK FIRST THING IN THE MORNING TO GET YOURSELF GOING AFTER A NIGHT OF HEAVY DRINKING: NEVER
HOW OFTEN DURING THE LAST YEAR HAVE YOU FOUND THAT YOU WERE NOT ABLE TO STOP DRINKING ONCE YOU HAD STARTED: NEVER
HOW MANY STANDARD DRINKS CONTAINING ALCOHOL DO YOU HAVE ON A TYPICAL DAY: 1
HOW OFTEN DURING THE LAST YEAR HAVE YOU FAILED TO DO WHAT WAS NORMALLY EXPECTED FROM YOU BECAUSE OF DRINKING: NEVER
HOW OFTEN DURING THE LAST YEAR HAVE YOU FOUND THAT YOU WERE NOT ABLE TO STOP DRINKING ONCE YOU HAD STARTED: NEVER
HOW OFTEN DURING THE LAST YEAR HAVE YOU BEEN UNABLE TO REMEMBER WHAT HAPPENED THE NIGHT BEFORE BECAUSE YOU HAD BEEN DRINKING: NEVER
HOW OFTEN DURING THE LAST YEAR HAVE YOU HAD A FEELING OF GUILT OR REMORSE AFTER DRINKING: NEVER
HOW OFTEN DURING THE LAST YEAR HAVE YOU HAD A FEELING OF GUILT OR REMORSE AFTER DRINKING: NEVER
HOW OFTEN DURING THE LAST YEAR HAVE YOU NEEDED AN ALCOHOLIC DRINK FIRST THING IN THE MORNING TO GET YOURSELF GOING AFTER A NIGHT OF HEAVY DRINKING: NEVER
HOW OFTEN DO YOU HAVE A DRINK CONTAINING ALCOHOL: 4 OR MORE TIMES A WEEK
HOW OFTEN DO YOU HAVE A DRINK CONTAINING ALCOHOL: 5
HAS A RELATIVE, FRIEND, DOCTOR, OR ANOTHER HEALTH PROFESSIONAL EXPRESSED CONCERN ABOUT YOUR DRINKING OR SUGGESTED YOU CUT DOWN: NO
HOW OFTEN DURING THE LAST YEAR HAVE YOU BEEN UNABLE TO REMEMBER WHAT HAPPENED THE NIGHT BEFORE BECAUSE YOU HAD BEEN DRINKING: NEVER
HAVE YOU OR SOMEONE ELSE BEEN INJURED AS A RESULT OF YOUR DRINKING: NO
HOW MANY STANDARD DRINKS CONTAINING ALCOHOL DO YOU HAVE ON A TYPICAL DAY: 1 OR 2

## 2025-07-18 SDOH — ECONOMIC STABILITY: FOOD INSECURITY: WITHIN THE PAST 12 MONTHS, YOU WORRIED THAT YOUR FOOD WOULD RUN OUT BEFORE YOU GOT MONEY TO BUY MORE.: NEVER TRUE

## 2025-07-18 SDOH — ECONOMIC STABILITY: FOOD INSECURITY: WITHIN THE PAST 12 MONTHS, THE FOOD YOU BOUGHT JUST DIDN'T LAST AND YOU DIDN'T HAVE MONEY TO GET MORE.: NEVER TRUE

## 2025-07-18 SDOH — ECONOMIC STABILITY: INCOME INSECURITY: IN THE LAST 12 MONTHS, WAS THERE A TIME WHEN YOU WERE NOT ABLE TO PAY THE MORTGAGE OR RENT ON TIME?: NO

## 2025-07-18 SDOH — ECONOMIC STABILITY: TRANSPORTATION INSECURITY
IN THE PAST 12 MONTHS, HAS THE LACK OF TRANSPORTATION KEPT YOU FROM MEDICAL APPOINTMENTS OR FROM GETTING MEDICATIONS?: NO

## 2025-07-21 ENCOUNTER — HOSPITAL ENCOUNTER (OUTPATIENT)
Dept: GENERAL RADIOLOGY | Age: 70
Discharge: HOME | End: 2025-07-23
Payer: MEDICARE

## 2025-07-21 ENCOUNTER — OFFICE VISIT (OUTPATIENT)
Dept: INTERNAL MEDICINE | Age: 70
End: 2025-07-21

## 2025-07-21 VITALS
BODY MASS INDEX: 26.8 KG/M2 | DIASTOLIC BLOOD PRESSURE: 62 MMHG | RESPIRATION RATE: 16 BRPM | HEIGHT: 64 IN | SYSTOLIC BLOOD PRESSURE: 120 MMHG | HEART RATE: 77 BPM | OXYGEN SATURATION: 96 % | WEIGHT: 157 LBS

## 2025-07-21 DIAGNOSIS — M79.671 RIGHT FOOT PAIN: ICD-10-CM

## 2025-07-21 DIAGNOSIS — E78.5 DYSLIPIDEMIA: ICD-10-CM

## 2025-07-21 DIAGNOSIS — Z00.00 MEDICARE ANNUAL WELLNESS VISIT, SUBSEQUENT: Primary | ICD-10-CM

## 2025-07-21 DIAGNOSIS — E55.9 VITAMIN D DEFICIENCY: ICD-10-CM

## 2025-07-21 DIAGNOSIS — R73.02 IMPAIRED GLUCOSE TOLERANCE: ICD-10-CM

## 2025-07-21 DIAGNOSIS — E03.9 HYPOTHYROIDISM, UNSPECIFIED TYPE: ICD-10-CM

## 2025-07-21 PROCEDURE — 73630 X-RAY EXAM OF FOOT: CPT

## 2025-07-21 RX ORDER — CYCLOSPORINE 0.5 MG/ML
1 EMULSION OPHTHALMIC DAILY
COMMUNITY
Start: 2025-06-03

## 2025-07-21 NOTE — PROGRESS NOTES
Medicare Annual Wellness Visit    Qian Calvillo is here for Medicare AWV and Foot Pain (Right foot pain , been going on for 6 weeks , want an xray , )    Assessment & Plan        No follow-ups on file.     Subjective       Patient's complete Health Risk Assessment and screening values have been reviewed and are found in Flowsheets. The following problems were reviewed today and where indicated follow up appointments were made and/or referrals ordered.    No Positive Risk Factors identified today.                                     Objective   Vitals:    07/21/25 1005   BP: 120/62   BP Site: Left Upper Arm   Patient Position: Sitting   BP Cuff Size: Medium Adult   Pulse: 77   Resp: 16   SpO2: 96%   Weight: 71.2 kg (157 lb)   Height: 1.626 m (5' 4\")      Body mass index is 26.95 kg/m².                    Allergies   Allergen Reactions    Ciprofloxacin Nausea Only    Sulfa Antibiotics Hives and Rash     Prior to Visit Medications    Medication Sig Taking? Authorizing Provider   RESTASIS 0.05 % ophthalmic emulsion Place 1 drop into both eyes Daily Yes Libby Lr MD   NONFORMULARY Take 1 capsule by mouth daily Take 1( 325mg )  capsule daily Yes Libby Lr MD   Lactobacillus (AZO VAGINAL HEALTH PROBIOTIC PO) Take 1 capsule by mouth daily Yes Libby Lr MD   rosuvastatin (CRESTOR) 5 MG tablet TAKE 1 TABLET NIGHTLY Yes Meenakshi Kirby MD   ezetimibe (ZETIA) 10 MG tablet Take 1 tablet by mouth daily Yes Roxanna Guardado APRN - CNP   oxyBUTYnin (DITROPAN-XL) 5 MG extended release tablet Take 1 tablet by mouth daily Yes Silvano Fermin MD   NONFORMULARY Vectomega [place] Omega 3- 1 po daily Yes Libby Lr MD   NONFORMULARY CuraMed (Curcumin 750 mg 1 po daily Yes Libby Lr MD   CRANBERRY PO Take by mouth With Mannose- 2 capsule (500 mg total) bid Yes Libby Lr MD   MILK THISTLE PO Take 760 mg by mouth in the morning and at bedtime Yes Libby Lr MD

## 2025-07-21 NOTE — PROGRESS NOTES
[facility] INTERNAL MEDICINE    Visit Date:  7/21/2025  Patient:  Qian Calvillo  YOB: 1955    Assessment & Plan     Right foot pain: Will order x-ray.  Reassess afterwards.  Will consider referral to podiatry.    Hyperlipidemia: Continue Crestor and Zetia.  Will continue to monitor.    Overactive bladder: Continue Ditropan.  Follows with urology.    Impaired fasting glucose: Will continue to monitor.    Hypothyroidism: TSH appears to be under control with iodine supplements.  Will continue to monitor.    Vitamin D deficiency: Will continue to monitor.    Osteopenia: Continue vitamin D and calcium.    Bell's palsy: Appears to be stable at this time.     Diagnosis Orders   1. Medicare annual wellness visit, subsequent        2. Right foot pain        3. Dyslipidemia  CBC with Auto Differential    Comprehensive Metabolic Panel    Lipid Panel      4. Hypothyroidism, unspecified type  TSH reflex to FT4      5. Vitamin D deficiency  Vitamin D 25 Hydroxy      6. Impaired glucose tolerance  Hemoglobin A1C          Chief Complaint  Medicare AWV and Foot Pain (Right foot pain , been going on for 6 weeks , want an xray , )      History of Present Illness  The patient presents for a Medicare wellness visit and reports experiencing right foot pain.    She maintains an active lifestyle, including a daily 3-mile walk. Approximately 6 weeks ago, she began experiencing intermittent pain in her right foot, which she describes as similar to a bruise. The onset of the pain was sudden, occurring upon waking one morning, and she is unsure if it is related to an injury or strain. The pain is primarily present during walking and subsides when she sits down. She reports no pain in her ankle. She has not sought any treatment for the pain and has no history of foot fractures. She is concerned about potentially exacerbating the condition through continued walking.     Objective  /62 (BP Site: Left Upper

## 2025-07-31 ENCOUNTER — TELEPHONE (OUTPATIENT)
Dept: PODIATRY | Age: 70
End: 2025-07-31

## 2025-07-31 ENCOUNTER — OFFICE VISIT (OUTPATIENT)
Dept: PODIATRY | Age: 70
End: 2025-07-31
Payer: MEDICARE

## 2025-07-31 VITALS
HEART RATE: 72 BPM | SYSTOLIC BLOOD PRESSURE: 132 MMHG | DIASTOLIC BLOOD PRESSURE: 68 MMHG | BODY MASS INDEX: 26.8 KG/M2 | WEIGHT: 157 LBS | HEIGHT: 64 IN

## 2025-07-31 DIAGNOSIS — M19.079 INFLAMMATION OF FOOT JOINT: Primary | ICD-10-CM

## 2025-07-31 DIAGNOSIS — M79.671 FOOT PAIN, RIGHT: ICD-10-CM

## 2025-07-31 PROCEDURE — 99213 OFFICE O/P EST LOW 20 MIN: CPT | Performed by: PODIATRIST

## 2025-07-31 NOTE — PROGRESS NOTES
Subjective:  Qian Calvillo is a 70 y.o. female who presents to the office today complaining of right foot pain..  Symptoms began 1 month(s) ago.  No trauma or injury.  Patient relates pain is Present.  Pain is rated 4 out of 10 and is described as waxing and waning, moderate.  Treatments prior to today's visit include: X-rays ice.  Currently denies F/C/N/V.     Allergies   Allergen Reactions    Ciprofloxacin Nausea Only    Sulfa Antibiotics Hives and Rash       Past Medical History:   Diagnosis Date    Appendicitis 10/20/2022    Hypercholesterolemia     Hypothyroidism     Subacute, not currently on treeatment.    Impaired glucose tolerance     Prediabetes/stable and checking A1C yearly    Macular degeneration     Menopausal syndrome     Mitral regurgitation     Echo 1/19 mild MR    Osteopenia     Dexa 8/18 FRAX 1% at hip, T -1.5 Hip    Vitamin D deficiency        Prior to Admission medications    Medication Sig Start Date End Date Taking? Authorizing Provider   RESTASIS 0.05 % ophthalmic emulsion Place 1 drop into both eyes Daily 6/3/25  Yes Libby Lr MD   NONFORMULARY Take 1 capsule by mouth daily Take 1( 325mg )  capsule daily   Yes ProviderLibby MD   Lactobacillus (AZO VAGINAL HEALTH PROBIOTIC PO) Take 1 capsule by mouth daily   Yes Libby Lr MD   rosuvastatin (CRESTOR) 5 MG tablet TAKE 1 TABLET NIGHTLY 6/12/25  Yes Meenakshi Kirby MD   ezetimibe (ZETIA) 10 MG tablet Take 1 tablet by mouth daily 4/8/25  Yes Roxanna Guardado APRN - CNP   oxyBUTYnin (DITROPAN-XL) 5 MG extended release tablet Take 1 tablet by mouth daily 11/6/24  Yes Silvano Fermin MD   NONFORMULARY Vectomega [place] Omega 3- 1 po daily   Yes Libby Lr MD   NONFORMULARY CuraMed (Curcumin 750 mg 1 po daily   Yes Libby Lr MD   CRANBERRY PO Take by mouth With Mannose- 2 capsule (500 mg total) bid   Yes Libby Lr MD   MILK THISTLE PO Take 760 mg by mouth in the morning and at bedtime

## 2025-07-31 NOTE — TELEPHONE ENCOUNTER
Patient called asking to speak to the nurse that seen her today during her office visit. Stated she had some questions about the fabricated insoles. Can contact patient at Ph# [phone].

## 2025-09-01 DIAGNOSIS — E78.5 DYSLIPIDEMIA: ICD-10-CM

## 2025-09-02 RX ORDER — ROSUVASTATIN CALCIUM 5 MG/1
5 TABLET, COATED ORAL NIGHTLY
Qty: 90 TABLET | Refills: 0 | Status: SHIPPED | OUTPATIENT
Start: 2025-09-02

## 2025-09-03 ENCOUNTER — TELEMEDICINE (OUTPATIENT)
Dept: UROLOGY | Age: 70
End: 2025-09-03
Payer: MEDICARE

## 2025-09-03 DIAGNOSIS — N32.81 OVERACTIVE BLADDER: ICD-10-CM

## 2025-09-03 DIAGNOSIS — N39.0 RECURRENT UTI: Primary | ICD-10-CM

## 2025-09-03 DIAGNOSIS — R35.0 URINARY FREQUENCY: ICD-10-CM

## 2025-09-03 DIAGNOSIS — R35.1 NOCTURIA: ICD-10-CM

## 2025-09-03 PROCEDURE — 99214 OFFICE O/P EST MOD 30 MIN: CPT | Performed by: UROLOGY

## 2025-09-03 PROCEDURE — 1123F ACP DISCUSS/DSCN MKR DOCD: CPT | Performed by: UROLOGY

## 2025-09-03 PROCEDURE — 99212 OFFICE O/P EST SF 10 MIN: CPT | Performed by: UROLOGY

## (undated) DEVICE — MERCY DEFIANCE ENDO KIT: Brand: MEDLINE INDUSTRIES, INC.

## (undated) DEVICE — CO2 CANNULA,SSOFT,ADLT,7O2,4CO2,FEMALE: Brand: MEDLINE

## (undated) DEVICE — INSUFFLATION TUBING SET, ENDOFLATOR 50: Brand: N.A.

## (undated) DEVICE — TIP COVER ACCESSORY

## (undated) DEVICE — ANCHOR TISSUE RETRIEVAL SYSTEM, BAG SIZE 125 ML, PORT SIZE 8 MM: Brand: ANCHOR TISSUE RETRIEVAL SYSTEM

## (undated) DEVICE — SOLUTION IV 1000ML 0.9% SOD CHL PH 5 INJ USP VIAFLX PLAS

## (undated) DEVICE — 4-PORT MANIFOLD: Brand: NEPTUNE 2

## (undated) DEVICE — 60 ML SYRINGE REGULAR TIP: Brand: MONOJECT

## (undated) DEVICE — CANNULA NSL AD L2IN ETCO2 SAMP SFT CRUSH RESIST FEM AIRLFE

## (undated) DEVICE — ARM DRAPE

## (undated) DEVICE — Device

## (undated) DEVICE — COLONOSCOPE ENDOSCP ABSORBENT SM PEDIATRIC 104 MM VISION

## (undated) DEVICE — BLANKET WRM W29.9XL79.1IN UP BODY FORC AIR MISTRAL-AIR

## (undated) DEVICE — CANNULA SEAL

## (undated) DEVICE — GLOVE ORANGE PI 7 1/2   MSG9075

## (undated) DEVICE — SMOKE EVACUATION FILTER, SET WITH TUBE: Brand: N.A.

## (undated) DEVICE — TUBING SET, SUCTION LAP, S-PILOT: Brand: N.A.

## (undated) DEVICE — GENERAL ROBOTIC PACK: Brand: MEDLINE INDUSTRIES, INC.

## (undated) DEVICE — 1200CC GUARDIAN II: Brand: GUARDIAN

## (undated) DEVICE — BLADELESS OBTURATOR: Brand: WECK VISTA

## (undated) DEVICE — TRAP SURG QUAD PARABOLA SLOT DSGN SFTY SCRN TRAPEASE

## (undated) DEVICE — SOLUTION ANTIFOG VIS SYS CLEARIFY LAPSCP

## (undated) DEVICE — SUTURE PERMA-HAND SZ 2-0 L30IN NONABSORBABLE BLK L26MM SH K833H

## (undated) DEVICE — SNARE ENDOSCP L240CM SHTH DIA2.4MM LOOP W20MM MIN WRK CHN

## (undated) DEVICE — SUTURE PERMAHAND SZ 0 L30IN NONABSORBABLE BLK SILK UNIDIR SA86G

## (undated) DEVICE — SUTURE MCRYL SZ 4-0 L27IN ABSRB UD L19MM PS-2 1/2 CIR PRIM Y426H

## (undated) DEVICE — PAD, GROUNDING, UNIVERSAL, SPLIT, 9': Brand: MEDLINE